# Patient Record
Sex: FEMALE | Race: BLACK OR AFRICAN AMERICAN | NOT HISPANIC OR LATINO
[De-identification: names, ages, dates, MRNs, and addresses within clinical notes are randomized per-mention and may not be internally consistent; named-entity substitution may affect disease eponyms.]

---

## 2021-04-08 ENCOUNTER — APPOINTMENT (OUTPATIENT)
Dept: OTOLARYNGOLOGY | Facility: CLINIC | Age: 86
End: 2021-04-08

## 2021-07-15 ENCOUNTER — INPATIENT (INPATIENT)
Facility: HOSPITAL | Age: 86
LOS: 2 days | Discharge: HOME | End: 2021-07-18
Attending: SURGERY | Admitting: SURGERY
Payer: MEDICARE

## 2021-07-15 VITALS
WEIGHT: 117.95 LBS | TEMPERATURE: 98 F | RESPIRATION RATE: 18 BRPM | HEIGHT: 63 IN | DIASTOLIC BLOOD PRESSURE: 79 MMHG | HEART RATE: 96 BPM | OXYGEN SATURATION: 98 % | SYSTOLIC BLOOD PRESSURE: 129 MMHG

## 2021-07-15 LAB
ALBUMIN SERPL ELPH-MCNC: 3.9 G/DL — SIGNIFICANT CHANGE UP (ref 3.3–5)
ALP SERPL-CCNC: 98 U/L — SIGNIFICANT CHANGE UP (ref 40–120)
ALT FLD-CCNC: 19 U/L — SIGNIFICANT CHANGE UP (ref 12–78)
ANION GAP SERPL CALC-SCNC: 5 MMOL/L — SIGNIFICANT CHANGE UP (ref 5–17)
APPEARANCE UR: CLEAR — SIGNIFICANT CHANGE UP
APTT BLD: 32 SEC — SIGNIFICANT CHANGE UP (ref 27.5–35.5)
AST SERPL-CCNC: 25 U/L — SIGNIFICANT CHANGE UP (ref 15–37)
BACTERIA # UR AUTO: ABNORMAL
BASOPHILS # BLD AUTO: 0.02 K/UL — SIGNIFICANT CHANGE UP (ref 0–0.2)
BASOPHILS NFR BLD AUTO: 0.3 % — SIGNIFICANT CHANGE UP (ref 0–2)
BILIRUB SERPL-MCNC: 0.5 MG/DL — SIGNIFICANT CHANGE UP (ref 0.2–1.2)
BILIRUB UR-MCNC: NEGATIVE — SIGNIFICANT CHANGE UP
BLD GP AB SCN SERPL QL: SIGNIFICANT CHANGE UP
BUN SERPL-MCNC: 24 MG/DL — HIGH (ref 7–23)
CALCIUM SERPL-MCNC: 9.9 MG/DL — SIGNIFICANT CHANGE UP (ref 8.5–10.1)
CHLORIDE SERPL-SCNC: 103 MMOL/L — SIGNIFICANT CHANGE UP (ref 96–108)
CO2 SERPL-SCNC: 30 MMOL/L — SIGNIFICANT CHANGE UP (ref 22–31)
COLOR SPEC: YELLOW — SIGNIFICANT CHANGE UP
CREAT SERPL-MCNC: 1.12 MG/DL — SIGNIFICANT CHANGE UP (ref 0.5–1.3)
DIFF PNL FLD: NEGATIVE — SIGNIFICANT CHANGE UP
EOSINOPHIL # BLD AUTO: 0.01 K/UL — SIGNIFICANT CHANGE UP (ref 0–0.5)
EOSINOPHIL NFR BLD AUTO: 0.2 % — SIGNIFICANT CHANGE UP (ref 0–6)
EPI CELLS # UR: SIGNIFICANT CHANGE UP
FLUAV AG NPH QL: SIGNIFICANT CHANGE UP
FLUBV AG NPH QL: SIGNIFICANT CHANGE UP
GLUCOSE SERPL-MCNC: 107 MG/DL — HIGH (ref 70–99)
GLUCOSE UR QL: NEGATIVE MG/DL — SIGNIFICANT CHANGE UP
HCT VFR BLD CALC: 39.4 % — SIGNIFICANT CHANGE UP (ref 34.5–45)
HGB BLD-MCNC: 12.7 G/DL — SIGNIFICANT CHANGE UP (ref 11.5–15.5)
IMM GRANULOCYTES NFR BLD AUTO: 0.2 % — SIGNIFICANT CHANGE UP (ref 0–1.5)
INR BLD: 1.11 RATIO — SIGNIFICANT CHANGE UP (ref 0.88–1.16)
KETONES UR-MCNC: ABNORMAL
LACTATE SERPL-SCNC: 1.3 MMOL/L — SIGNIFICANT CHANGE UP (ref 0.7–2)
LEUKOCYTE ESTERASE UR-ACNC: ABNORMAL
LIDOCAIN IGE QN: 47 U/L — LOW (ref 73–393)
LYMPHOCYTES # BLD AUTO: 1.25 K/UL — SIGNIFICANT CHANGE UP (ref 1–3.3)
LYMPHOCYTES # BLD AUTO: 19.7 % — SIGNIFICANT CHANGE UP (ref 13–44)
MCHC RBC-ENTMCNC: 28 PG — SIGNIFICANT CHANGE UP (ref 27–34)
MCHC RBC-ENTMCNC: 32.2 GM/DL — SIGNIFICANT CHANGE UP (ref 32–36)
MCV RBC AUTO: 87 FL — SIGNIFICANT CHANGE UP (ref 80–100)
MONOCYTES # BLD AUTO: 0.43 K/UL — SIGNIFICANT CHANGE UP (ref 0–0.9)
MONOCYTES NFR BLD AUTO: 6.8 % — SIGNIFICANT CHANGE UP (ref 2–14)
NEUTROPHILS # BLD AUTO: 4.63 K/UL — SIGNIFICANT CHANGE UP (ref 1.8–7.4)
NEUTROPHILS NFR BLD AUTO: 72.8 % — SIGNIFICANT CHANGE UP (ref 43–77)
NITRITE UR-MCNC: NEGATIVE — SIGNIFICANT CHANGE UP
NRBC # BLD: 0 /100 WBCS — SIGNIFICANT CHANGE UP (ref 0–0)
PH UR: 8 — SIGNIFICANT CHANGE UP (ref 5–8)
PLATELET # BLD AUTO: 257 K/UL — SIGNIFICANT CHANGE UP (ref 150–400)
POTASSIUM SERPL-MCNC: 4.5 MMOL/L — SIGNIFICANT CHANGE UP (ref 3.5–5.3)
POTASSIUM SERPL-SCNC: 4.5 MMOL/L — SIGNIFICANT CHANGE UP (ref 3.5–5.3)
PROT SERPL-MCNC: 8 GM/DL — SIGNIFICANT CHANGE UP (ref 6–8.3)
PROT UR-MCNC: 15 MG/DL
PROTHROM AB SERPL-ACNC: 12.8 SEC — SIGNIFICANT CHANGE UP (ref 10.6–13.6)
RBC # BLD: 4.53 M/UL — SIGNIFICANT CHANGE UP (ref 3.8–5.2)
RBC # FLD: 15.4 % — HIGH (ref 10.3–14.5)
SARS-COV-2 RNA SPEC QL NAA+PROBE: SIGNIFICANT CHANGE UP
SODIUM SERPL-SCNC: 138 MMOL/L — SIGNIFICANT CHANGE UP (ref 135–145)
SP GR SPEC: 1.01 — SIGNIFICANT CHANGE UP (ref 1.01–1.02)
UROBILINOGEN FLD QL: NEGATIVE MG/DL — SIGNIFICANT CHANGE UP
WBC # BLD: 6.35 K/UL — SIGNIFICANT CHANGE UP (ref 3.8–10.5)
WBC # FLD AUTO: 6.35 K/UL — SIGNIFICANT CHANGE UP (ref 3.8–10.5)
WBC UR QL: SIGNIFICANT CHANGE UP

## 2021-07-15 PROCEDURE — 99221 1ST HOSP IP/OBS SF/LOW 40: CPT

## 2021-07-15 PROCEDURE — 99285 EMERGENCY DEPT VISIT HI MDM: CPT

## 2021-07-15 PROCEDURE — 93010 ELECTROCARDIOGRAM REPORT: CPT

## 2021-07-15 PROCEDURE — 74018 RADEX ABDOMEN 1 VIEW: CPT | Mod: 26

## 2021-07-15 PROCEDURE — 99222 1ST HOSP IP/OBS MODERATE 55: CPT

## 2021-07-15 PROCEDURE — 74018 RADEX ABDOMEN 1 VIEW: CPT | Mod: 26,77

## 2021-07-15 PROCEDURE — 74177 CT ABD & PELVIS W/CONTRAST: CPT | Mod: 26,MA

## 2021-07-15 PROCEDURE — 71045 X-RAY EXAM CHEST 1 VIEW: CPT | Mod: 26

## 2021-07-15 RX ORDER — DEXTROSE MONOHYDRATE, SODIUM CHLORIDE, AND POTASSIUM CHLORIDE 50; .745; 4.5 G/1000ML; G/1000ML; G/1000ML
1000 INJECTION, SOLUTION INTRAVENOUS
Refills: 0 | Status: DISCONTINUED | OUTPATIENT
Start: 2021-07-15 | End: 2021-07-18

## 2021-07-15 RX ORDER — ONDANSETRON 8 MG/1
4 TABLET, FILM COATED ORAL EVERY 6 HOURS
Refills: 0 | Status: DISCONTINUED | OUTPATIENT
Start: 2021-07-15 | End: 2021-07-18

## 2021-07-15 RX ORDER — LATANOPROST 0.05 MG/ML
1 SOLUTION/ DROPS OPHTHALMIC; TOPICAL AT BEDTIME
Refills: 0 | Status: DISCONTINUED | OUTPATIENT
Start: 2021-07-15 | End: 2021-07-18

## 2021-07-15 RX ORDER — DORZOLAMIDE HYDROCHLORIDE 20 MG/ML
1 SOLUTION/ DROPS OPHTHALMIC
Refills: 0 | Status: DISCONTINUED | OUTPATIENT
Start: 2021-07-15 | End: 2021-07-18

## 2021-07-15 RX ORDER — HEPARIN SODIUM 5000 [USP'U]/ML
5000 INJECTION INTRAVENOUS; SUBCUTANEOUS EVERY 12 HOURS
Refills: 0 | Status: DISCONTINUED | OUTPATIENT
Start: 2021-07-15 | End: 2021-07-18

## 2021-07-15 RX ORDER — PANTOPRAZOLE SODIUM 20 MG/1
40 TABLET, DELAYED RELEASE ORAL DAILY
Refills: 0 | Status: DISCONTINUED | OUTPATIENT
Start: 2021-07-15 | End: 2021-07-18

## 2021-07-15 RX ORDER — ONDANSETRON 8 MG/1
4 TABLET, FILM COATED ORAL ONCE
Refills: 0 | Status: COMPLETED | OUTPATIENT
Start: 2021-07-15 | End: 2021-07-15

## 2021-07-15 RX ORDER — SODIUM CHLORIDE 9 MG/ML
1000 INJECTION INTRAMUSCULAR; INTRAVENOUS; SUBCUTANEOUS ONCE
Refills: 0 | Status: COMPLETED | OUTPATIENT
Start: 2021-07-15 | End: 2021-07-15

## 2021-07-15 RX ADMIN — ONDANSETRON 4 MILLIGRAM(S): 8 TABLET, FILM COATED ORAL at 17:34

## 2021-07-15 RX ADMIN — DORZOLAMIDE HYDROCHLORIDE 1 DROP(S): 20 SOLUTION/ DROPS OPHTHALMIC at 21:10

## 2021-07-15 RX ADMIN — PANTOPRAZOLE SODIUM 40 MILLIGRAM(S): 20 TABLET, DELAYED RELEASE ORAL at 20:09

## 2021-07-15 RX ADMIN — SODIUM CHLORIDE 1000 MILLILITER(S): 9 INJECTION INTRAMUSCULAR; INTRAVENOUS; SUBCUTANEOUS at 18:41

## 2021-07-15 RX ADMIN — DEXTROSE MONOHYDRATE, SODIUM CHLORIDE, AND POTASSIUM CHLORIDE 90 MILLILITER(S): 50; .745; 4.5 INJECTION, SOLUTION INTRAVENOUS at 20:45

## 2021-07-15 RX ADMIN — LATANOPROST 1 DROP(S): 0.05 SOLUTION/ DROPS OPHTHALMIC; TOPICAL at 21:13

## 2021-07-15 NOTE — ED ADULT NURSE NOTE - PMH
Benign neoplasm of colon    Cataract  bilateral  H/O: HTN (hypertension)    H/O: osteoarthritis  shoulders, hands, cervical spine

## 2021-07-15 NOTE — CONSULT NOTE ADULT - ASSESSMENT
87 yo lady with a pmhx of HTN, colon [id number], SBO who presents to the ED with Nausea, vomiting and abdominal pain since yesterday morning. Her pain has been a aching, intermittent, and diffuse. No chest pain, no sob, no diarrhea, no fevers or chills. Patient states these symptoms were similar to her past admission years ago in which she had to have surgery for adhesions       #SBO  NGT in place  NPO, IVF  Plan for possible surgery  Patient is able to tolerate 4 mets at baseline, no previous complication with anesthesia  RCRI Class 1  EKG sinus tachy, no acute ischemic changes noted   Patient is medically optimized at intermediate risk for urgent/emergent procedure    #Glaucoma  Continue eye drops    #HTN   Continue Lisinopril and Norvasc    #DVT ppx   On Heparin  Management per surgery     Thank you for allowing us to participate in the care of this patient. Our team will continue to follow along with you. Further recommendations to follow pending the clinical course.

## 2021-07-15 NOTE — H&P ADULT - NSHPPHYSICALEXAM_GEN_ALL_CORE
Alert and oriented x 3, normal mood and affect, no apparent risk to self or others. Constitutional: WDWN resting comfortably in bed; NAD  HEENT: NC/AT, PERRL, EOMI, anicteric sclera, no nasal discharge; uvula midline, no oropharyngeal erythema or exudates  Neck: supple; no JVD or thyromegaly  Respiratory: CTA B/L; no W/R/R, no retractions  Cardiac: +S1/S2; RRR; no M/R/G; PMI non-displaced  Gastrointestinal: soft,+ Distention, nontender, hypoactive bs  Extremities: , no clubbing or cyanosis; no peripheral edema  Musculoskeletal:  no joint swelling, tenderness or erythema  Vascular: 2+ radial, femoral, DP/PT pulses B/L  Skin: warm, dry and intact; no rashes, wounds, or scars  Neurologic: AAOx3; CNS grossly intact; no focal deficits

## 2021-07-15 NOTE — CONSULT NOTE ADULT - TIME BILLING
The total amount of time listed was spent reviewing the hospital notes, laboratory values, imaging findings, assessing/counseling the patient, discussing with consultant physicians, and nursing staff.

## 2021-07-15 NOTE — H&P ADULT - NSICDXPASTMEDICALHX_GEN_ALL_CORE_FT
PAST MEDICAL HISTORY:  Benign neoplasm of colon     Cataract bilateral    H/O: HTN (hypertension)     H/O: osteoarthritis shoulders, hands, cervical spine

## 2021-07-15 NOTE — ED ADULT NURSE NOTE - NSIMPLEMENTINTERV_GEN_ALL_ED
Implemented All Fall Risk Interventions:  [place] to call system. Call bell, personal items and telephone within reach. Instruct patient to call for assistance. Room bathroom lighting operational. Non-slip footwear when patient is off stretcher. Physically safe environment: no spills, clutter or unnecessary equipment. Stretcher in lowest position, wheels locked, appropriate side rails in place. Provide visual cue, wrist band, yellow gown, etc. Monitor gait and stability. Monitor for mental status changes and reorient to person, place, and time. Review medications for side effects contributing to fall risk. Reinforce activity limits and safety measures with patient and family.

## 2021-07-15 NOTE — H&P ADULT - HISTORY OF PRESENT ILLNESS
Pt is a 85 yo lady with a pmhx of HTN, colon [id number], SBO who presents to the ED with Nausea, vomiting and abdominal pain since yesterday morning. Her pain has been a aching, intermittent, and diffuse. No chest pain, no sob, no diarrhea, no fevers or chills. Patient states these symptoms were similar to her past admission years ago in which she had to have surgery for adhesions

## 2021-07-15 NOTE — H&P ADULT - ASSESSMENT
87y/o female with SBO  pmhx of HTN, colon [id number], ? ANETA 7- 8 years ago - not documented    - NPO, NGT decompression  - IV resuscitation  - Medical consultation  - Antiemetics PRN  - serial abdominal exams   -will discuss with Attending

## 2021-07-15 NOTE — CONSULT NOTE ADULT - SUBJECTIVE AND OBJECTIVE BOX
Patient is a 86y old  Female who presents with a chief complaint of     FROM ADMISSION H+P:   HPI:85 yo F with a pmhx of HTN, colon resection, SBO who presents to the ED with abdominal pain. Pt started having abd pain yesterday. Has been intermittent, diffuse. Has had vomiting. No chest pain, no sob, no diarrhea, no fevers. Was concerned that it was similar to prior SBO. No dysuria.      ----  INTERVAL HPI/OVERNIGHT EVENTS: Pt seen and evaluated at the bedside. No acute overnight events occurred.     ----  PAST MEDICAL & SURGICAL HISTORY:  Benign neoplasm of colon    H/O: HTN (hypertension)    H/O: osteoarthritis  shoulders, hands, cervical spine    Cataract  bilateral    Hx of right hemicolectomy  1998    S/P bunionectomy  bilateral        ----  Home medications:    ----  FAMILY HISTORY:  No pertinent family history in first degree relatives        ----  Allergies    No Known Allergies    Intolerances        ----  Social History:  - Alcohol: deneis  - tobacco: denies  - recreational drug use: denies   - occupation: retired   - living circumstances:  - ambulation status:    ----  REVIEW OF SYSTEMS:  CONSTITUTIONAL: denies fever, chills, fatigue, weakness  HEENT: denies blurred vision, sore throat  SKIN: denies new lesions, rash  CARDIOVASCULAR: denies chest pain, chest pressure, palpitations  RESPIRATORY: denies shortness of breath, sputum production  GASTROINTESTINAL: denies nausea, vomiting, diarrhea, abdominal pain  GENITOURINARY: denies dysuria, discharge  NEUROLOGICAL: denies numbness, headache, focal weakness  MUSCULOSKELETAL: denies new joint pain, muscle aches  HEMATOLOGIC: denies gross bleeding, bruising  LYMPHATICS: denies enlarged lymph nodes, extremity swelling  PSYCHIATRIC: denies recent changes in anxiety, depression  ENDOCRINOLOGIC: denies sweating, cold or heat intolerance    ----  PHYSICAL EXAM:  GENERAL: patient appears well, no acute distress, appropriately interactive  EYES: sclera clear, no exudates  ENMT: oropharynx clear without erythema, moist mucous membranes  NECK: supple, soft, no thyromegaly noted  LUNGS: good air entry bilaterally, clear to auscultation, symmetric breath sounds, no wheezing or rhonchi appreciated  HEART: soft S1/S2, regular rate and rhythm, no murmurs noted, no noted edema to bilateral lower extremities  GASTROINTESTINAL: abdomen is soft, nontender, nondistended, normoactive bowel sounds, no palpable masses  INTEGUMENT: good skin turgor, appropriate for ethnicity, appears well perfused, no jaundice noted  MUSCULOSKELETAL: no clubbing or cyanosis, no obvious deformity  NEUROLOGIC: awake, alert, oriented x3, good muscle tone in 4 extremities, no obvious sensory deficits  PSYCHIATRIC: mood is good, affect is congruent with mood, linear and logical thought process  HEME/LYMPH: no palpable supraclavicular nodules, no obvious ecchymosis     T(C): 36.9 (07-15-21 @ 12:42), Max: 36.9 (07-15-21 @ 12:42)  HR: 96 (07-15-21 @ 12:42) (96 - 96)  BP: 129/79 (07-15-21 @ 12:42) (129/79 - 129/79)  RR: 18 (07-15-21 @ 12:42) (18 - 18)  SpO2: 98% (07-15-21 @ 12:42) (98% - 98%)  Wt(kg): --    ----  I&O's Summary      LABS:                        12.7   6.35  )-----------( 257      ( 15 Jul 2021 14:06 )             39.4     07-15    138  |  103  |  24<H>  ----------------------------<  107<H>  4.5   |  30  |  1.12    Ca    9.9      15 Jul 2021 14:06    TPro  8.0  /  Alb  3.9  /  TBili  0.5  /  DBili  x   /  AST  25  /  ALT  19  /  AlkPhos  98  07-15    PT/INR - ( 15 Jul 2021 17:37 )   PT: 12.8 sec;   INR: 1.11 ratio         PTT - ( 15 Jul 2021 17:37 )  PTT:32.0 sec    CAPILLARY BLOOD GLUCOSE                    ----  Personally reviewed:  Vital sign trends: [  ] yes    [  ] no     [  ] n/a  Laboratory results: [  ] yes    [  ] no     [  ] n/a  Radiology results: [  ] yes    [  ] no     [  ] n/a  Culture results: [  ] yes    [  ] no     [  ] n/a  Consultant recommendations: [  ] yes    [  ] no     [  ] n/a    < from: CT Abdomen and Pelvis w/ IV Cont (07.15.21 @ 16:53) >    EXAM:  CT ABDOMEN AND PELVIS IC                            PROCEDURE DATE:  07/15/2021          INTERPRETATION:  CLINICAL INFORMATION: Cramps. Abnormal abdominal x-ray.    COMPARISON: February 15, 2014.    CONTRAST/COMPLICATIONS:  IV Contrast: Omnipaque 350  90 cc administered   10 cc discarded  Oral Contrast: NONE  Complications: None reported at time of study completion    PROCEDURE:  CT of the Abdomen and Pelvis was performed.  Sagittal and coronal reformats were performed.    FINDINGS:  LOWER CHEST: Within normal limits.    LIVER: Subcentimeter hypodense hepatic lesions, too small to characterize; however, many are unchanged since February 15, 2014.  BILE DUCTS: Normal caliber.  GALLBLADDER: Within normal limits.  SPLEEN: Within normal limits.  PANCREAS: Within normal limits.  ADRENALS: Within normal limits.  KIDNEYS/URETERS: No hydronephrosis. Subcentimeter hypodense hepatic lesions, too small to characterize.    BLADDER: Within normal limits.  REPRODUCTIVE ORGANS: Calcified uterine leiomyomas.    BOWEL: High-grade small bowel obstruction with transition point in the right anterior pelvis (series 2, image 95). Partial right hemicolectomy with ileocolic anastomosis. Diverticulosis.  PERITONEUM: No ascites.  VESSELS: Atherosclerotic changes.  RETROPERITONEUM/LYMPH NODES: No lymphadenopathy.  ABDOMINAL WALL: Within normal limits.  BONES: Degenerative changes.    IMPRESSION:  High-grade small bowel obstruction with transition point in the right anterior pelvis.    --- End of Report ---            MARIZA NEWBY MD; Attending Radiologist  This document has been electronically signed. Jul 15 2021  5:21PM    < end of copied text >    < from: Xray Kidney Ureter Bladder (07.15.21 @ 14:54) >  EXAM:  XR KUB 1 VIEW                            PROCEDURE DATE:  07/15/2021          INTERPRETATION:  Abdominal pain, history of small bowel obstruction.    AP supine abdomen. Prior 7/2/2016.    Moderate colonic stool burden.  mild to moderate gaseous distention of small bowel loops in the lower abdomen may represent ileus or partial obstruction. Small amount of colonic gas noted. Correlate with CT. Nonspecific pelvic calcifications.    IMPRESSION: Distended small bowel in the lower abdomen should be correlated with CT abdomen pelvis    --- End of Report ---            CLAUDETTE GUERRERO MD; Attending Radiologist  This document has been electronically signed. Jul 15 2021  3:18PM    < end of copied text >    < from: Xray Chest 1 View AP/PA. (07.15.21 @ 14:54) >  EXAM:  XR CHEST AP OR PA 1V                            PROCEDURE DATE:  07/15/2021          INTERPRETATION:  Abdominal pain.    AP chest. Prior 3/3/2014.    Normal heart size. Biapical pleural thickening. No consolidation or effusion. Mammillated left hemidiaphragm similar to prior. Degenerative change left shoulder.    IMPRESSION: No active infiltrates.    --- End of Report ---            CLAUDETTE GUERRERO MD; Attending Radiologist  This document has been electronically signed. Jul 15 2021  3:21PM    < end of copied text >         Patient is a 86y old  Female who presents with a chief complaint of     FROM ADMISSION H+P:   87 yo lady with a pmhx of HTN, colon [id number], SBO who presents to the ED with Nausea, vomiting and abdominal pain since yesterday morning. Her pain has been a aching, intermittent, and diffuse. No chest pain, no sob, no diarrhea, no fevers or chills. Patient states these symptoms were similar to her past admission years ago in which she had to have surgery for adhesions     ----  INTERVAL HPI/OVERNIGHT EVENTS: Pt seen and evaluated at the bedside. No acute overnight events occurred.     ----  PAST MEDICAL & SURGICAL HISTORY:  Benign neoplasm of colon    H/O: HTN (hypertension)    H/O: osteoarthritis  shoulders, hands, cervical spine    Cataract  bilateral    Hx of right hemicolectomy  1998    S/P bunionectomy  bilateral        ----  Home medications:    ----  FAMILY HISTORY:  No pertinent family history in first degree relatives        ----  Allergies    No Known Allergies    Intolerances        ----  Social History:  - Alcohol: deneis  - tobacco: denies  - recreational drug use: denies   - occupation: retired     ----  REVIEW OF SYSTEMS:  CONSTITUTIONAL: denies fever, chills, fatigue, weakness  HEENT: denies blurred vision, sore throat  CARDIOVASCULAR: denies chest pain, chest pressure, palpitations  RESPIRATORY: denies shortness of breath, sputum production  GASTROINTESTINAL: admits abdominal pain   GENITOURINARY: denies dysuria, discharge  NEUROLOGICAL: denies numbness, headache, focal weakness  MUSCULOSKELETAL: denies new joint pain, muscle aches  HEMATOLOGIC: denies gross bleeding, bruising    ----  PHYSICAL EXAM:  GENERAL: patient appears well, no acute distress, appropriately interactive  EYES: sclera clear, no exudates  LUNGS: good air entry bilaterally, clear to auscultation, symmetric breath sounds, no wheezing or rhonchi appreciated  HEART: soft S1/S2, regular rate and rhythm, no murmurs noted, no noted edema to bilateral lower extremities  GASTROINTESTINAL: abdomen is soft, mildly tender to deep palpation, diminished bs   INTEGUMENT: good skin turgor, appropriate for ethnicity  MUSCULOSKELETAL: no clubbing or cyanosis, no obvious deformity  NEUROLOGIC: awake, alert, oriented x3, good muscle tone in 4 extremities, no obvious sensory deficits  PSYCHIATRIC: mood is good, affect is congruent with mood, linear and logical thought process    T(C): 36.9 (07-15-21 @ 12:42), Max: 36.9 (07-15-21 @ 12:42)  HR: 96 (07-15-21 @ 12:42) (96 - 96)  BP: 129/79 (07-15-21 @ 12:42) (129/79 - 129/79)  RR: 18 (07-15-21 @ 12:42) (18 - 18)  SpO2: 98% (07-15-21 @ 12:42) (98% - 98%)  Wt(kg): --    ----  I&O's Summary      LABS:                        12.7   6.35  )-----------( 257      ( 15 Jul 2021 14:06 )             39.4     07-15    138  |  103  |  24<H>  ----------------------------<  107<H>  4.5   |  30  |  1.12    Ca    9.9      15 Jul 2021 14:06    TPro  8.0  /  Alb  3.9  /  TBili  0.5  /  DBili  x   /  AST  25  /  ALT  19  /  AlkPhos  98  07-15    PT/INR - ( 15 Jul 2021 17:37 )   PT: 12.8 sec;   INR: 1.11 ratio         PTT - ( 15 Jul 2021 17:37 )  PTT:32.0 sec    CAPILLARY BLOOD GLUCOSE                    ----  Personally reviewed:  Vital sign trends: [ x ] yes    [  ] no     [  ] n/a  Laboratory results: [x  ] yes    [  ] no     [  ] n/a  Radiology results: [x  ] yes    [  ] no     [  ] n/a  Culture results: [x  ] yes    [  ] no     [  ] n/a  Consultant recommendations: [  ] yes    [  ] no     [  ] n/a    < from: CT Abdomen and Pelvis w/ IV Cont (07.15.21 @ 16:53) >    EXAM:  CT ABDOMEN AND PELVIS IC                            PROCEDURE DATE:  07/15/2021          INTERPRETATION:  CLINICAL INFORMATION: Cramps. Abnormal abdominal x-ray.    COMPARISON: February 15, 2014.    CONTRAST/COMPLICATIONS:  IV Contrast: Omnipaque 350  90 cc administered   10 cc discarded  Oral Contrast: NONE  Complications: None reported at time of study completion    PROCEDURE:  CT of the Abdomen and Pelvis was performed.  Sagittal and coronal reformats were performed.    FINDINGS:  LOWER CHEST: Within normal limits.    LIVER: Subcentimeter hypodense hepatic lesions, too small to characterize; however, many are unchanged since February 15, 2014.  BILE DUCTS: Normal caliber.  GALLBLADDER: Within normal limits.  SPLEEN: Within normal limits.  PANCREAS: Within normal limits.  ADRENALS: Within normal limits.  KIDNEYS/URETERS: No hydronephrosis. Subcentimeter hypodense hepatic lesions, too small to characterize.    BLADDER: Within normal limits.  REPRODUCTIVE ORGANS: Calcified uterine leiomyomas.    BOWEL: High-grade small bowel obstruction with transition point in the right anterior pelvis (series 2, image 95). Partial right hemicolectomy with ileocolic anastomosis. Diverticulosis.  PERITONEUM: No ascites.  VESSELS: Atherosclerotic changes.  RETROPERITONEUM/LYMPH NODES: No lymphadenopathy.  ABDOMINAL WALL: Within normal limits.  BONES: Degenerative changes.    IMPRESSION:  High-grade small bowel obstruction with transition point in the right anterior pelvis.    --- End of Report ---            MARIZA NEWBY MD; Attending Radiologist  This document has been electronically signed. Jul 15 2021  5:21PM    < end of copied text >    < from: Xray Kidney Ureter Bladder (07.15.21 @ 14:54) >  EXAM:  XR KUB 1 VIEW                            PROCEDURE DATE:  07/15/2021          INTERPRETATION:  Abdominal pain, history of small bowel obstruction.    AP supine abdomen. Prior 7/2/2016.    Moderate colonic stool burden.  mild to moderate gaseous distention of small bowel loops in the lower abdomen may represent ileus or partial obstruction. Small amount of colonic gas noted. Correlate with CT. Nonspecific pelvic calcifications.    IMPRESSION: Distended small bowel in the lower abdomen should be correlated with CT abdomen pelvis    --- End of Report ---            CLAUDETTE GUERRERO MD; Attending Radiologist  This document has been electronically signed. Jul 15 2021  3:18PM    < end of copied text >    < from: Xray Chest 1 View AP/PA. (07.15.21 @ 14:54) >  EXAM:  XR CHEST AP OR PA 1V                            PROCEDURE DATE:  07/15/2021          INTERPRETATION:  Abdominal pain.    AP chest. Prior 3/3/2014.    Normal heart size. Biapical pleural thickening. No consolidation or effusion. Mammillated left hemidiaphragm similar to prior. Degenerative change left shoulder.    IMPRESSION: No active infiltrates.    --- End of Report ---            CLAUDETTE GUERRERO MD; Attending Radiologist  This document has been electronically signed. Jul 15 2021  3:21PM    < end of copied text >         Patient is a 86y old  Female who presents with a chief complaint of     FROM ADMISSION H+P:   85 yo lady with a pmhx of HTN, colon [id number], SBO who presents to the ED with Nausea, vomiting and abdominal pain since yesterday morning. Her pain has been a aching, intermittent, and diffuse. No chest pain, no sob, no diarrhea, no fevers or chills. Patient states these symptoms were similar to her past admission years ago in which she had to have surgery for adhesions     ----  INTERVAL HPI/OVERNIGHT EVENTS: Pt seen and evaluated at the bedside.  ----  PAST MEDICAL & SURGICAL HISTORY:  Benign neoplasm of colon    H/O: HTN (hypertension)    H/O: osteoarthritis  shoulders, hands, cervical spine    Cataract  bilateral    Hx of right hemicolectomy  1998    S/P bunionectomy  bilateral        ----  Home medications:    ----  FAMILY HISTORY:  No pertinent family history in first degree relatives        ----  Allergies    No Known Allergies    Intolerances        ----  Social History:  - Alcohol: deneis  - tobacco: denies  - recreational drug use: denies   - occupation: retired     ----  REVIEW OF SYSTEMS:  CONSTITUTIONAL: denies fever, chills, fatigue, weakness  HEENT: denies blurred vision, sore throat  CARDIOVASCULAR: denies chest pain, chest pressure, palpitations  RESPIRATORY: denies shortness of breath, sputum production  GASTROINTESTINAL: admits abdominal pain   GENITOURINARY: denies dysuria, discharge  NEUROLOGICAL: denies numbness, headache, focal weakness  MUSCULOSKELETAL: denies new joint pain, muscle aches  HEMATOLOGIC: denies gross bleeding, bruising    ----  PHYSICAL EXAM:  GENERAL: patient appears well, no acute distress, appropriately interactive  EYES: sclera clear, no exudates  LUNGS: good air entry bilaterally, clear to auscultation, symmetric breath sounds, no wheezing or rhonchi appreciated  HEART: soft S1/S2, regular rate and rhythm, no murmurs noted, no noted edema to bilateral lower extremities  GASTROINTESTINAL: abdomen is soft, mildly tender to deep palpation, diminished bs   INTEGUMENT: good skin turgor, appropriate for ethnicity  MUSCULOSKELETAL: no clubbing or cyanosis, no obvious deformity  NEUROLOGIC: awake, alert, oriented x3, good muscle tone in 4 extremities, no obvious sensory deficits  PSYCHIATRIC: mood is good, affect is congruent with mood, linear and logical thought process    T(C): 36.9 (07-15-21 @ 12:42), Max: 36.9 (07-15-21 @ 12:42)  HR: 96 (07-15-21 @ 12:42) (96 - 96)  BP: 129/79 (07-15-21 @ 12:42) (129/79 - 129/79)  RR: 18 (07-15-21 @ 12:42) (18 - 18)  SpO2: 98% (07-15-21 @ 12:42) (98% - 98%)  Wt(kg): --    ----  I&O's Summary      LABS:                        12.7   6.35  )-----------( 257      ( 15 Jul 2021 14:06 )             39.4     07-15    138  |  103  |  24<H>  ----------------------------<  107<H>  4.5   |  30  |  1.12    Ca    9.9      15 Jul 2021 14:06    TPro  8.0  /  Alb  3.9  /  TBili  0.5  /  DBili  x   /  AST  25  /  ALT  19  /  AlkPhos  98  07-15    PT/INR - ( 15 Jul 2021 17:37 )   PT: 12.8 sec;   INR: 1.11 ratio         PTT - ( 15 Jul 2021 17:37 )  PTT:32.0 sec    CAPILLARY BLOOD GLUCOSE                    ----  Personally reviewed:  Vital sign trends: [ x ] yes    [  ] no     [  ] n/a  Laboratory results: [x  ] yes    [  ] no     [  ] n/a  Radiology results: [x  ] yes    [  ] no     [  ] n/a  Culture results: [x  ] yes    [  ] no     [  ] n/a  Consultant recommendations: [  ] yes    [  ] no     [  ] n/a    < from: CT Abdomen and Pelvis w/ IV Cont (07.15.21 @ 16:53) >    EXAM:  CT ABDOMEN AND PELVIS IC                            PROCEDURE DATE:  07/15/2021          INTERPRETATION:  CLINICAL INFORMATION: Cramps. Abnormal abdominal x-ray.    COMPARISON: February 15, 2014.    CONTRAST/COMPLICATIONS:  IV Contrast: Omnipaque 350  90 cc administered   10 cc discarded  Oral Contrast: NONE  Complications: None reported at time of study completion    PROCEDURE:  CT of the Abdomen and Pelvis was performed.  Sagittal and coronal reformats were performed.    FINDINGS:  LOWER CHEST: Within normal limits.    LIVER: Subcentimeter hypodense hepatic lesions, too small to characterize; however, many are unchanged since February 15, 2014.  BILE DUCTS: Normal caliber.  GALLBLADDER: Within normal limits.  SPLEEN: Within normal limits.  PANCREAS: Within normal limits.  ADRENALS: Within normal limits.  KIDNEYS/URETERS: No hydronephrosis. Subcentimeter hypodense hepatic lesions, too small to characterize.    BLADDER: Within normal limits.  REPRODUCTIVE ORGANS: Calcified uterine leiomyomas.    BOWEL: High-grade small bowel obstruction with transition point in the right anterior pelvis (series 2, image 95). Partial right hemicolectomy with ileocolic anastomosis. Diverticulosis.  PERITONEUM: No ascites.  VESSELS: Atherosclerotic changes.  RETROPERITONEUM/LYMPH NODES: No lymphadenopathy.  ABDOMINAL WALL: Within normal limits.  BONES: Degenerative changes.    IMPRESSION:  High-grade small bowel obstruction with transition point in the right anterior pelvis.    --- End of Report ---            MARIZA NEWBY MD; Attending Radiologist  This document has been electronically signed. Jul 15 2021  5:21PM    < end of copied text >    < from: Xray Kidney Ureter Bladder (07.15.21 @ 14:54) >  EXAM:  XR KUB 1 VIEW                            PROCEDURE DATE:  07/15/2021          INTERPRETATION:  Abdominal pain, history of small bowel obstruction.    AP supine abdomen. Prior 7/2/2016.    Moderate colonic stool burden.  mild to moderate gaseous distention of small bowel loops in the lower abdomen may represent ileus or partial obstruction. Small amount of colonic gas noted. Correlate with CT. Nonspecific pelvic calcifications.    IMPRESSION: Distended small bowel in the lower abdomen should be correlated with CT abdomen pelvis    --- End of Report ---            CLAUDETTE GUERRERO MD; Attending Radiologist  This document has been electronically signed. Jul 15 2021  3:18PM    < end of copied text >    < from: Xray Chest 1 View AP/PA. (07.15.21 @ 14:54) >  EXAM:  XR CHEST AP OR PA 1V                            PROCEDURE DATE:  07/15/2021          INTERPRETATION:  Abdominal pain.    AP chest. Prior 3/3/2014.    Normal heart size. Biapical pleural thickening. No consolidation or effusion. Mammillated left hemidiaphragm similar to prior. Degenerative change left shoulder.    IMPRESSION: No active infiltrates.    --- End of Report ---            CLAUDETTE GUERRERO MD; Attending Radiologist  This document has been electronically signed. Jul 15 2021  3:21PM    < end of copied text >

## 2021-07-15 NOTE — ED PROVIDER NOTE - CLINICAL SUMMARY MEDICAL DECISION MAKING FREE TEXT BOX
Ddx: Ro sbo/ diverticulitis  Plan: cbc, cmp, lactate, lipase, CT abd, kub, cxr, pt declines pain meds, reassess

## 2021-07-15 NOTE — ED ADULT NURSE NOTE - NS_SISCREENINGSR_GEN_ALL_ED
Daily Note     Today's date: 2019  Patient name: Joby Gordon  : 1943  MRN: [id number]  Referring provider: Janes Menjivar Negative

## 2021-07-15 NOTE — ED ADULT NURSE NOTE - OBJECTIVE STATEMENT
p pt alert and oriented x 4, pt c/o abdominal pain for approx 2 days , denies diarrhea , fever or chills .

## 2021-07-16 LAB
ANION GAP SERPL CALC-SCNC: 7 MMOL/L — SIGNIFICANT CHANGE UP (ref 5–17)
BUN SERPL-MCNC: 20 MG/DL — SIGNIFICANT CHANGE UP (ref 7–23)
CALCIUM SERPL-MCNC: 9.2 MG/DL — SIGNIFICANT CHANGE UP (ref 8.5–10.1)
CHLORIDE SERPL-SCNC: 103 MMOL/L — SIGNIFICANT CHANGE UP (ref 96–108)
CO2 SERPL-SCNC: 28 MMOL/L — SIGNIFICANT CHANGE UP (ref 22–31)
COVID-19 SPIKE DOMAIN AB INTERP: POSITIVE
COVID-19 SPIKE DOMAIN ANTIBODY RESULT: >250 U/ML — HIGH
CREAT SERPL-MCNC: 1 MG/DL — SIGNIFICANT CHANGE UP (ref 0.5–1.3)
GLUCOSE SERPL-MCNC: 126 MG/DL — HIGH (ref 70–99)
HCT VFR BLD CALC: 39.4 % — SIGNIFICANT CHANGE UP (ref 34.5–45)
HGB BLD-MCNC: 12.3 G/DL — SIGNIFICANT CHANGE UP (ref 11.5–15.5)
MAGNESIUM SERPL-MCNC: 2.4 MG/DL — SIGNIFICANT CHANGE UP (ref 1.6–2.6)
MCHC RBC-ENTMCNC: 27.4 PG — SIGNIFICANT CHANGE UP (ref 27–34)
MCHC RBC-ENTMCNC: 31.2 GM/DL — LOW (ref 32–36)
MCV RBC AUTO: 87.8 FL — SIGNIFICANT CHANGE UP (ref 80–100)
NRBC # BLD: 0 /100 WBCS — SIGNIFICANT CHANGE UP (ref 0–0)
PHOSPHATE SERPL-MCNC: 3.5 MG/DL — SIGNIFICANT CHANGE UP (ref 2.5–4.5)
PLATELET # BLD AUTO: 260 K/UL — SIGNIFICANT CHANGE UP (ref 150–400)
POTASSIUM SERPL-MCNC: 4.4 MMOL/L — SIGNIFICANT CHANGE UP (ref 3.5–5.3)
POTASSIUM SERPL-SCNC: 4.4 MMOL/L — SIGNIFICANT CHANGE UP (ref 3.5–5.3)
RBC # BLD: 4.49 M/UL — SIGNIFICANT CHANGE UP (ref 3.8–5.2)
RBC # FLD: 15.5 % — HIGH (ref 10.3–14.5)
SARS-COV-2 IGG+IGM SERPL QL IA: >250 U/ML — HIGH
SARS-COV-2 IGG+IGM SERPL QL IA: POSITIVE
SODIUM SERPL-SCNC: 138 MMOL/L — SIGNIFICANT CHANGE UP (ref 135–145)
WBC # BLD: 5.6 K/UL — SIGNIFICANT CHANGE UP (ref 3.8–10.5)
WBC # FLD AUTO: 5.6 K/UL — SIGNIFICANT CHANGE UP (ref 3.8–10.5)

## 2021-07-16 PROCEDURE — 99232 SBSQ HOSP IP/OBS MODERATE 35: CPT

## 2021-07-16 PROCEDURE — 74018 RADEX ABDOMEN 1 VIEW: CPT | Mod: 26,76

## 2021-07-16 RX ORDER — DIATRIZOATE MEGLUMINE 180 MG/ML
90 INJECTION, SOLUTION INTRAVESICAL ONCE
Refills: 0 | Status: COMPLETED | OUTPATIENT
Start: 2021-07-16 | End: 2021-07-16

## 2021-07-16 RX ADMIN — PANTOPRAZOLE SODIUM 40 MILLIGRAM(S): 20 TABLET, DELAYED RELEASE ORAL at 11:20

## 2021-07-16 RX ADMIN — ONDANSETRON 4 MILLIGRAM(S): 8 TABLET, FILM COATED ORAL at 16:48

## 2021-07-16 RX ADMIN — HEPARIN SODIUM 5000 UNIT(S): 5000 INJECTION INTRAVENOUS; SUBCUTANEOUS at 17:04

## 2021-07-16 RX ADMIN — DEXTROSE MONOHYDRATE, SODIUM CHLORIDE, AND POTASSIUM CHLORIDE 90 MILLILITER(S): 50; .745; 4.5 INJECTION, SOLUTION INTRAVENOUS at 16:48

## 2021-07-16 RX ADMIN — LATANOPROST 1 DROP(S): 0.05 SOLUTION/ DROPS OPHTHALMIC; TOPICAL at 21:06

## 2021-07-16 RX ADMIN — DORZOLAMIDE HYDROCHLORIDE 1 DROP(S): 20 SOLUTION/ DROPS OPHTHALMIC at 21:06

## 2021-07-16 RX ADMIN — DORZOLAMIDE HYDROCHLORIDE 1 DROP(S): 20 SOLUTION/ DROPS OPHTHALMIC at 07:35

## 2021-07-16 RX ADMIN — DIATRIZOATE MEGLUMINE 90 MILLILITER(S): 180 INJECTION, SOLUTION INTRAVESICAL at 11:49

## 2021-07-16 RX ADMIN — HEPARIN SODIUM 5000 UNIT(S): 5000 INJECTION INTRAVENOUS; SUBCUTANEOUS at 05:18

## 2021-07-16 NOTE — PROGRESS NOTE ADULT - SUBJECTIVE AND OBJECTIVE BOX
Pt. seen and examined at bedside resting comfortably.  Admits mild abd pain . Denies fever/chills, chest pain,     Vital Signs Last 24 Hrs  T(C): 37.1 (2021 05:16), Max: 37.6 (15 Jul 2021 19:50)  T(F): 98.7 (2021 05:16), Max: 99.7 (15 Jul 2021 19:50)  HR: 89 (2021 05:16) (89 - 102)  BP: 122/75 (2021 05:16) (119/76 - 147/86)  BP(mean): --  RR: 17 (2021 05:16) (16 - 18)  SpO2: 99% (2021 05:16) (96% - 99%)    PHYSICAL EXAM:    GENERAL: NAD  HEAD:  Atraumatic, Normocephalic  EYES: EOMI, PERRLA, conjunctiva and sclera clear  CHEST/LUNG: Clear to ausculation, bilaterally   HEART: S1S2  ABDOMEN: non distended, +BS, soft, non tender, no guarding  EXTREMITIES:  calf soft, non tender b/l. 2+ distal pulses b/l.     I&O's Detail    15 Jul 2021 07:01  -  2021 05:46  --------------------------------------------------------  IN:  Total IN: 0 mL    OUT:    Nasogastric/Oral tube (mL): 650 mL  Total OUT: 650 mL    Total NET: -650 mL          LABS:                        12.7   6.35  )-----------( 257      ( 15 Jul 2021 14:06 )             39.4     07-15    138  |  103  |  24<H>  ----------------------------<  107<H>  4.5   |  30  |  1.12    Ca    9.9      15 Jul 2021 14:06    TPro  8.0  /  Alb  3.9  /  TBili  0.5  /  DBili  x   /  AST  25  /  ALT  19  /  AlkPhos  98  07-15    PT/INR - ( 15 Jul 2021 17:37 )   PT: 12.8 sec;   INR: 1.11 ratio         PTT - ( 15 Jul 2021 17:37 )  PTT:32.0 sec  Urinalysis Basic - ( 15 Jul 2021 22:30 )    Color: Yellow / Appearance: Clear / S.010 / pH: x  Gluc: x / Ketone: Trace  / Bili: Negative / Urobili: Negative mg/dL   Blood: x / Protein: 15 mg/dL / Nitrite: Negative   Leuk Esterase: Trace / RBC: x / WBC 0-2   Sq Epi: x / Non Sq Epi: Occasional / Bacteria: Moderate        type    RADIOLOGY & ADDITIONAL STUDIES:    85y/o female  pmhx of HTN, colon [id number], ? ANETA with SBO managed conservatively      - NPO,   -Cont NGT decompression to LCS  - Antiemetics PRN  - serial abdominal exams   -will discuss with Attending  -f/up AM labs   -DVT prophylaxis: Heparin and IPCs   Pt. seen and examined at bedside resting comfortably.  No fl;atus/BM  Admits mild abd pain . Denies fever/chills, chest pain,     Vital Signs Last 24 Hrs  T(C): 37.1 (2021 05:16), Max: 37.6 (15 Jul 2021 19:50)  T(F): 98.7 (2021 05:16), Max: 99.7 (15 Jul 2021 19:50)  HR: 89 (2021 05:16) (89 - 102)  BP: 122/75 (2021 05:16) (119/76 - 147/86)  BP(mean): --  RR: 17 (2021 05:16) (16 - 18)  SpO2: 99% (2021 05:16) (96% - 99%)    PHYSICAL EXAM:    GENERAL: NAD  HEAD:  Atraumatic, Normocephalic  EYES: EOMI, PERRLA, conjunctiva and sclera clear  CHEST/LUNG: Clear to ausculation, bilaterally   HEART: S1S2  ABDOMEN: non distended, +BS, soft, non tender, no guarding  EXTREMITIES:  calf soft, non tender b/l. 2+ distal pulses b/l.     I&O's Detail    15 Jul 2021 07:01  -  2021 05:46  --------------------------------------------------------  IN:  Total IN: 0 mL    OUT:    Nasogastric/Oral tube (mL): 650 mL  Total OUT: 650 mL    Total NET: -650 mL          LABS:                        12.7   6.35  )-----------( 257      ( 15 Jul 2021 14:06 )             39.4     07-15    138  |  103  |  24<H>  ----------------------------<  107<H>  4.5   |  30  |  1.12    Ca    9.9      15 Jul 2021 14:06    TPro  8.0  /  Alb  3.9  /  TBili  0.5  /  DBili  x   /  AST  25  /  ALT  19  /  AlkPhos  98  07-15    PT/INR - ( 15 Jul 2021 17:37 )   PT: 12.8 sec;   INR: 1.11 ratio         PTT - ( 15 Jul 2021 17:37 )  PTT:32.0 sec  Urinalysis Basic - ( 15 Jul 2021 22:30 )    Color: Yellow / Appearance: Clear / S.010 / pH: x  Gluc: x / Ketone: Trace  / Bili: Negative / Urobili: Negative mg/dL   Blood: x / Protein: 15 mg/dL / Nitrite: Negative   Leuk Esterase: Trace / RBC: x / WBC 0-2   Sq Epi: x / Non Sq Epi: Occasional / Bacteria: Moderate        type    RADIOLOGY & ADDITIONAL STUDIES:    85y/o female  pmhx of HTN, colon [id number], ? ANETA with SBO managed conservatively . No bowel fxn      - NPO,   -Cont NGT decompression to LCS  - Antiemetics PRN  - serial abdominal exams   -will discuss with Attending  -f/up AM labs   -DVT prophylaxis: Heparin and IPCs

## 2021-07-16 NOTE — PROGRESS NOTE ADULT - SUBJECTIVE AND OBJECTIVE BOX
Patient is a 86y old  Female who presents with a chief complaint of     INTERVAL HPI/OVERNIGHT EVENTS: Patient seen and examined at bedside. No overnight events. Patient feeling better, abdominal pain improving      MEDICATIONS  (STANDING):  dextrose 5% + sodium chloride 0.45% with potassium chloride 20 mEq/L 1000 milliLiter(s) (90 mL/Hr) IV Continuous <Continuous>  dorzolamide 2% Ophthalmic Solution 1 Drop(s) Left EYE <User Schedule>  heparin   Injectable 5000 Unit(s) SubCutaneous every 12 hours  latanoprost 0.005% Ophthalmic Solution 1 Drop(s) Both EYES at bedtime  pantoprazole  Injectable 40 milliGRAM(s) IV Push daily    MEDICATIONS  (PRN):  ondansetron Injectable 4 milliGRAM(s) IV Push every 6 hours PRN Nausea      Allergies    No Known Allergies    Intolerances        REVIEW OF SYSTEMS:  CONSTITUTIONAL: No fever or chills  HEENT:  No headache, no sore throat  RESPIRATORY: No cough, wheezing, or shortness of breath  CARDIOVASCULAR: No chest pain, palpitations, or leg swelling  GASTROINTESTINAL: Admits abdominal pain which is improving, no nausea, vomiting, or diarrhea  GENITOURINARY: No dysuria, frequency, or hematuria  NEUROLOGICAL: no focal weakness or dizziness  MUSCULOSKELETAL: no myalgias     Vital Signs Last 24 Hrs  T(C): 37.2 (2021 12:07), Max: 37.6 (15 Jul 2021 19:50)  T(F): 98.9 (2021 12:07), Max: 99.7 (15 Jul 2021 19:50)  HR: 87 (2021 12:07) (87 - 102)  BP: 123/68 (2021 12:07) (119/76 - 147/86)  BP(mean): --  RR: 17 (2021 12:07) (16 - 18)  SpO2: 98% (2021 12:07) (96% - 99%)    PHYSICAL EXAM:  GENERAL: NAD  HEENT:  EOMI, moist mucous membranes  CHEST/LUNG:  CTA b/l, no rales, wheezes, or rhonchi  HEART:  RRR, S1, S2  ABDOMEN: very mild tenderness to deep palpation (improved)  BS+, soft, nondistended  EXTREMITIES: no edema, cyanosis, or calf tenderness  NERVOUS SYSTEM: AA&Ox3    LABS:                        12.3   5.60  )-----------( 260      ( 2021 08:22 )             39.4     CBC Full  -  ( 2021 08:22 )  WBC Count : 5.60 K/uL  Hemoglobin : 12.3 g/dL  Hematocrit : 39.4 %  Platelet Count - Automated : 260 K/uL  Mean Cell Volume : 87.8 fl  Mean Cell Hemoglobin : 27.4 pg  Mean Cell Hemoglobin Concentration : 31.2 gm/dL  Auto Neutrophil # : x  Auto Lymphocyte # : x  Auto Monocyte # : x  Auto Eosinophil # : x  Auto Basophil # : x  Auto Neutrophil % : x  Auto Lymphocyte % : x  Auto Monocyte % : x  Auto Eosinophil % : x  Auto Basophil % : x    2021 08:22    138    |  103    |  20     ----------------------------<  126    4.4     |  28     |  1.00     Ca    9.2        2021 08:22  Phos  3.5       2021 08:22  Mg     2.4       2021 08:22      PT/INR - ( 15 Jul 2021 17:37 )   PT: 12.8 sec;   INR: 1.11 ratio         PTT - ( 15 Jul 2021 17:37 )  PTT:32.0 sec  Urinalysis Basic - ( 15 Jul 2021 22:30 )    Color: Yellow / Appearance: Clear / S.010 / pH: x  Gluc: x / Ketone: Trace  / Bili: Negative / Urobili: Negative mg/dL   Blood: x / Protein: 15 mg/dL / Nitrite: Negative   Leuk Esterase: Trace / RBC: x / WBC 0-2   Sq Epi: x / Non Sq Epi: Occasional / Bacteria: Moderate      CAPILLARY BLOOD GLUCOSE        RADIOLOGY & ADDITIONAL TESTS: < from: CT Abdomen and Pelvis w/ IV Cont (07.15.21 @ 16:53) >    EXAM:  CT ABDOMEN AND PELVIS IC                            PROCEDURE DATE:  07/15/2021          INTERPRETATION:  CLINICAL INFORMATION: Cramps. Abnormal abdominal x-ray.    COMPARISON: February 15, 2014.    CONTRAST/COMPLICATIONS:  IV Contrast: Omnipaque 350  90 cc administered   10 cc discarded  Oral Contrast: NONE  Complications: None reported at time of study completion    PROCEDURE:  CT of the Abdomen and Pelvis was performed.  Sagittal and coronal reformats were performed.    FINDINGS:  LOWER CHEST: Within normal limits.    LIVER: Subcentimeter hypodense hepatic lesions, too small to characterize; however, many are unchanged since February 15, 2014.  BILE DUCTS: Normal caliber.  GALLBLADDER: Within normal limits.  SPLEEN: Within normal limits.  PANCREAS: Within normal limits.  ADRENALS: Within normal limits.  KIDNEYS/URETERS: No hydronephrosis. Subcentimeter hypodense hepatic lesions, too small to characterize.    BLADDER: Within normal limits.  REPRODUCTIVE ORGANS: Calcified uterine leiomyomas.    BOWEL: High-grade small bowel obstruction with transition point in the right anterior pelvis (series 2, image 95). Partial right hemicolectomy with ileocolic anastomosis. Diverticulosis.  PERITONEUM: No ascites.  VESSELS: Atherosclerotic changes.  RETROPERITONEUM/LYMPH NODES: No lymphadenopathy.  ABDOMINAL WALL: Within normal limits.  BONES: Degenerative changes.    IMPRESSION:  High-grade small bowel obstruction with transition point in the right anterior pelvis.    --- End of Report ---            MARIZA NEWBY MD; Attending Radiologist  This document has been electronically signed. Jul 15 2021  5:21PM    < end of copied text >      Consultant(s) Notes Reviewed:  [x] YES  [ ] NO    Care Discussed with [x] Consultants  [x] Patient  [ ] Family  [ ]      [ x] Other; RN  DVT ppx

## 2021-07-17 LAB
CULTURE RESULTS: SIGNIFICANT CHANGE UP
SPECIMEN SOURCE: SIGNIFICANT CHANGE UP

## 2021-07-17 PROCEDURE — 99232 SBSQ HOSP IP/OBS MODERATE 35: CPT

## 2021-07-17 PROCEDURE — 74018 RADEX ABDOMEN 1 VIEW: CPT | Mod: 26

## 2021-07-17 RX ORDER — LISINOPRIL 2.5 MG/1
40 TABLET ORAL DAILY
Refills: 0 | Status: DISCONTINUED | OUTPATIENT
Start: 2021-07-17 | End: 2021-07-18

## 2021-07-17 RX ORDER — AMLODIPINE BESYLATE 2.5 MG/1
5 TABLET ORAL DAILY
Refills: 0 | Status: DISCONTINUED | OUTPATIENT
Start: 2021-07-17 | End: 2021-07-18

## 2021-07-17 RX ADMIN — PANTOPRAZOLE SODIUM 40 MILLIGRAM(S): 20 TABLET, DELAYED RELEASE ORAL at 11:02

## 2021-07-17 RX ADMIN — HEPARIN SODIUM 5000 UNIT(S): 5000 INJECTION INTRAVENOUS; SUBCUTANEOUS at 05:12

## 2021-07-17 RX ADMIN — LATANOPROST 1 DROP(S): 0.05 SOLUTION/ DROPS OPHTHALMIC; TOPICAL at 21:09

## 2021-07-17 RX ADMIN — DORZOLAMIDE HYDROCHLORIDE 1 DROP(S): 20 SOLUTION/ DROPS OPHTHALMIC at 07:35

## 2021-07-17 RX ADMIN — HEPARIN SODIUM 5000 UNIT(S): 5000 INJECTION INTRAVENOUS; SUBCUTANEOUS at 17:14

## 2021-07-17 RX ADMIN — DORZOLAMIDE HYDROCHLORIDE 1 DROP(S): 20 SOLUTION/ DROPS OPHTHALMIC at 21:09

## 2021-07-17 RX ADMIN — DEXTROSE MONOHYDRATE, SODIUM CHLORIDE, AND POTASSIUM CHLORIDE 90 MILLILITER(S): 50; .745; 4.5 INJECTION, SOLUTION INTRAVENOUS at 07:34

## 2021-07-17 NOTE — PROGRESS NOTE ADULT - TIME BILLING
The total amount of time listed was spent reviewing the hospital notes, laboratory values, imaging findings, assessing/counseling the patient, discussing with consultant physicians, and nursing staff.
The total amount of time listed was spent reviewing the hospital notes, laboratory values, imaging findings, assessing/counseling the patient, discussing with consultant physicians, and nursing staff.

## 2021-07-17 NOTE — PROGRESS NOTE ADULT - SUBJECTIVE AND OBJECTIVE BOX
Patient is a 86y old  Female who presents with a chief complaint of   INTERVAL HPI/OVERNIGHT EVENTS: no events     MEDICATIONS  (STANDING):  dextrose 5% + sodium chloride 0.45% with potassium chloride 20 mEq/L 1000 milliLiter(s) (90 mL/Hr) IV Continuous <Continuous>  dorzolamide 2% Ophthalmic Solution 1 Drop(s) Left EYE <User Schedule>  heparin   Injectable 5000 Unit(s) SubCutaneous every 12 hours  latanoprost 0.005% Ophthalmic Solution 1 Drop(s) Both EYES at bedtime  pantoprazole  Injectable 40 milliGRAM(s) IV Push daily    MEDICATIONS  (PRN):  ondansetron Injectable 4 milliGRAM(s) IV Push every 6 hours PRN Nausea    Allergies    No Known Allergies    Intolerances      REVIEW OF SYSTEMS:  All other systems reviewed and are negative    Vital Signs Last 24 Hrs  T(C): 37.3 (2021 11:21), Max: 37.5 (2021 16:47)  T(F): 99.2 (2021 11:21), Max: 99.5 (2021 16:47)  HR: 73 (2021 11:21) (73 - 95)  BP: 125/73 (2021 11:21) (117/63 - 125/73)  BP(mean): --  RR: 17 (2021 11:21) (16 - 18)  SpO2: 96% (2021 11:21) (96% - 98%)  Daily     Daily   I&O's Summary    CAPILLARY BLOOD GLUCOSE        PHYSICAL EXAM:  GENERAL: NAD,    HEAD:  Atraumatic, Normocephalic  EYES: EOMI, PERRLA, conjunctiva and sclera clear  ENMT: No tonsillar erythema, exudates, or enlargement; Moist mucous membranes, Good dentition, No lesions  NECK: Supple, No JVD, Normal thyroid  NERVOUS SYSTEM:  Alert & Oriented X3, Good concentration; Motor Strength 5/5 B/L upper and lower extremities; DTRs 2+ intact and symmetric  CHEST/LUNG: Clear to percussion bilaterally; No rales, rhonchi, wheezing, or rubs  HEART: Regular rate and rhythm; No murmurs, rubs, or gallops  ABDOMEN: Soft, Nontender, Nondistended; Bowel sounds present  EXTREMITIES:  2+ Peripheral Pulses, No clubbing, cyanosis, or edema  LYMPH: No lymphadenopathy noted  SKIN: No rashes or lesions    Labs                          12.3   5.60  )-----------( 260      ( 2021 08:22 )             39.4     07-16    138  |  103  |  20  ----------------------------<  126<H>  4.4   |  28  |  1.00    Ca    9.2      2021 08:22  Phos  3.5     07-16  Mg     2.4     07-16    TPro  8.0  /  Alb  3.9  /  TBili  0.5  /  DBili  x   /  AST  25  /  ALT  19  /  AlkPhos  98  07-15    PT/INR - ( 15 Jul 2021 17:37 )   PT: 12.8 sec;   INR: 1.11 ratio         PTT - ( 15 Jul 2021 17:37 )  PTT:32.0 sec      Urinalysis Basic - ( 15 Jul 2021 22:30 )    Color: Yellow / Appearance: Clear / S.010 / pH: x  Gluc: x / Ketone: Trace  / Bili: Negative / Urobili: Negative mg/dL   Blood: x / Protein: 15 mg/dL / Nitrite: Negative   Leuk Esterase: Trace / RBC: x / WBC 0-2   Sq Epi: x / Non Sq Epi: Occasional / Bacteria: Moderate        Culture - Blood (collected 15 Jul 2021 23:00)  Source: .Blood Blood  Preliminary Report (2021 01:01):    No growth to date.                DVT prophylaxis: > Lovenox 40mg SQ daily  > Heparin   > SCD's

## 2021-07-17 NOTE — PROGRESS NOTE ADULT - SUBJECTIVE AND OBJECTIVE BOX
Patient seen and examined at bedside with Dr. Calle, pt sitting comfortably in chair. Pt states she had large liquid BMs overnight,+flatus, denies N/V  Abdominal pain is well controlled. Pt requesting something to drink.  Denies chest pain, dyspnea, cough.    T(F): 99.2 (07-17-21 @ 11:21), Max: 99.5 (07-16-21 @ 16:47)  HR: 73 (07-17-21 @ 11:21) (73 - 95)  BP: 125/73 (07-17-21 @ 11:21) (117/63 - 125/73)  RR: 17 (07-17-21 @ 11:21) (16 - 18)  SpO2: 96% (07-17-21 @ 11:21) (96% - 98%)  Wt(kg): --  CAPILLARY BLOOD GLUCOSE        PHYSICAL EXAM:  GENERAL: NAD  HEAD:  Atraumatic, Normocephalic  EYES: EOMI, PERRLA, conjunctiva and sclera clear  CHEST/LUNG: Clear to ausculation, bilaterally   HEART: S1S2  ABDOMEN: nondistended, +BS, soft, nontender, no guarding. NGT removed  EXTREMITIES:  calf soft, non tender b/l. 2+ distal pulses b/l.     LABS:    I&O's Detail    Culture Results:   No growth to date. (07-15 @ 23:00)  < from: Xray Kidney Ureter Bladder (07.17.21 @ 07:50) >  COMMENT:  On initial imaging shows an NG tube within the stomach.. The following radiograph shows contrast instilled within the stomach. The following radiograph shows contrast progressing through the small bowel. The final radiograph shows contrast throughout the entire large bowel to the level of the distal sigmoid/rectum.    There are no air-fluid levels.  There are no dilated loops of small or large bowel.  Air is seen within the small and large bowel.    There is no free air within the abdomen.  No abnormal calcifications are noted.    The osseous structures are within normal limits.    IMPRESSION:  Series of abdominal radiograph show progression of contrast instilled through an NG tube into the stomach although it to the level of the distal sigmoid/rectum.      < end of copied text >      85y/o female  pmhx of HTN, colon [id number], now with SBO- resolving    - clear liquid diet  - Antiemetics PRN  - f/u labs, replete lytes prn  -DVT prophylaxis: Heparin and IPCs  - cont medical comanagement  - discussed with Dr. Calle at bedside

## 2021-07-18 ENCOUNTER — TRANSCRIPTION ENCOUNTER (OUTPATIENT)
Age: 86
End: 2021-07-18

## 2021-07-18 VITALS
SYSTOLIC BLOOD PRESSURE: 118 MMHG | DIASTOLIC BLOOD PRESSURE: 74 MMHG | OXYGEN SATURATION: 100 % | TEMPERATURE: 99 F | RESPIRATION RATE: 17 BRPM | HEART RATE: 102 BPM

## 2021-07-18 LAB
ANION GAP SERPL CALC-SCNC: 3 MMOL/L — LOW (ref 5–17)
BUN SERPL-MCNC: 11 MG/DL — SIGNIFICANT CHANGE UP (ref 7–23)
CALCIUM SERPL-MCNC: 9.3 MG/DL — SIGNIFICANT CHANGE UP (ref 8.5–10.1)
CHLORIDE SERPL-SCNC: 107 MMOL/L — SIGNIFICANT CHANGE UP (ref 96–108)
CO2 SERPL-SCNC: 30 MMOL/L — SIGNIFICANT CHANGE UP (ref 22–31)
CREAT SERPL-MCNC: 1.08 MG/DL — SIGNIFICANT CHANGE UP (ref 0.5–1.3)
GLUCOSE SERPL-MCNC: 98 MG/DL — SIGNIFICANT CHANGE UP (ref 70–99)
HCT VFR BLD CALC: 36.4 % — SIGNIFICANT CHANGE UP (ref 34.5–45)
HGB BLD-MCNC: 11.5 G/DL — SIGNIFICANT CHANGE UP (ref 11.5–15.5)
MAGNESIUM SERPL-MCNC: 2.4 MG/DL — SIGNIFICANT CHANGE UP (ref 1.6–2.6)
MCHC RBC-ENTMCNC: 27.6 PG — SIGNIFICANT CHANGE UP (ref 27–34)
MCHC RBC-ENTMCNC: 31.6 GM/DL — LOW (ref 32–36)
MCV RBC AUTO: 87.5 FL — SIGNIFICANT CHANGE UP (ref 80–100)
NRBC # BLD: 0 /100 WBCS — SIGNIFICANT CHANGE UP (ref 0–0)
PHOSPHATE SERPL-MCNC: 2.6 MG/DL — SIGNIFICANT CHANGE UP (ref 2.5–4.5)
PLATELET # BLD AUTO: 254 K/UL — SIGNIFICANT CHANGE UP (ref 150–400)
POTASSIUM SERPL-MCNC: 4.4 MMOL/L — SIGNIFICANT CHANGE UP (ref 3.5–5.3)
POTASSIUM SERPL-SCNC: 4.4 MMOL/L — SIGNIFICANT CHANGE UP (ref 3.5–5.3)
RBC # BLD: 4.16 M/UL — SIGNIFICANT CHANGE UP (ref 3.8–5.2)
RBC # FLD: 14.8 % — HIGH (ref 10.3–14.5)
SODIUM SERPL-SCNC: 140 MMOL/L — SIGNIFICANT CHANGE UP (ref 135–145)
WBC # BLD: 4.45 K/UL — SIGNIFICANT CHANGE UP (ref 3.8–10.5)
WBC # FLD AUTO: 4.45 K/UL — SIGNIFICANT CHANGE UP (ref 3.8–10.5)

## 2021-07-18 PROCEDURE — 99232 SBSQ HOSP IP/OBS MODERATE 35: CPT

## 2021-07-18 RX ADMIN — AMLODIPINE BESYLATE 5 MILLIGRAM(S): 2.5 TABLET ORAL at 05:04

## 2021-07-18 RX ADMIN — DORZOLAMIDE HYDROCHLORIDE 1 DROP(S): 20 SOLUTION/ DROPS OPHTHALMIC at 07:57

## 2021-07-18 RX ADMIN — HEPARIN SODIUM 5000 UNIT(S): 5000 INJECTION INTRAVENOUS; SUBCUTANEOUS at 05:04

## 2021-07-18 RX ADMIN — PANTOPRAZOLE SODIUM 40 MILLIGRAM(S): 20 TABLET, DELAYED RELEASE ORAL at 11:54

## 2021-07-18 RX ADMIN — LISINOPRIL 40 MILLIGRAM(S): 2.5 TABLET ORAL at 05:04

## 2021-07-18 NOTE — PROGRESS NOTE ADULT - ASSESSMENT
85 yo lady with a pmhx of HTN, colon [id number], SBO who presents to the ED with Nausea, vomiting and abdominal pain since yesterday morning. Her pain has been a aching, intermittent, and diffuse. No chest pain, no sob, no diarrhea, no fevers or chills. Patient states these symptoms were similar to her past admission years ago in which she had to have surgery for adhesions       #SBO  Patient improving doing better today, dc plan per surgery   NGT in place  NPO, IVF  Repeat X-ray pending  Plan for possible surgery  Patient is able to tolerate 4 mets at baseline, no previous complication with anesthesia  RCRI Class 1  EKG sinus tachy, no acute ischemic changes noted   Patient is medically optimized at intermediate risk for urgent/emergent procedure    #Glaucoma  Continue eye drops    #HTN   Continue Lisinopril and Norvasc    #DVT ppx   On Heparin  Management per surgery     Thank you for allowing us to participate in the care of this patient. Our team will continue to follow along with you. Further recommendations to follow pending the clinical course.  
87 yo lady with a pmhx of HTN, colon [id number], SBO who presents to the ED with Nausea, vomiting and abdominal pain since yesterday morning. Her pain has been a aching, intermittent, and diffuse. No chest pain, no sob, no diarrhea, no fevers or chills. Patient states these symptoms were similar to her past admission years ago in which she had to have surgery for adhesions       #SBO  Patient improving  NGT in place  NPO, IVF  Repeat X-ray pending  Plan for possible surgery  Patient is able to tolerate 4 mets at baseline, no previous complication with anesthesia  RCRI Class 1  EKG sinus tachy, no acute ischemic changes noted   Patient is medically optimized at intermediate risk for urgent/emergent procedure    #Glaucoma  Continue eye drops    #HTN   Continue Lisinopril and Norvasc    #DVT ppx   On Heparin  Management per surgery     Thank you for allowing us to participate in the care of this patient. Our team will continue to follow along with you. Further recommendations to follow pending the clinical course.  
85 yo lady with a pmhx of HTN, colon [id number], SBO who presents to the ED with Nausea, vomiting and abdominal pain since yesterday morning. Her pain has been a aching, intermittent, and diffuse. No chest pain, no sob, no diarrhea, no fevers or chills. Patient states these symptoms were similar to her past admission years ago in which she had to have surgery for adhesions       #SBO  Patient improving  NGT in place  NPO, IVF  Repeat X-ray pending  Plan for possible surgery  Patient is able to tolerate 4 mets at baseline, no previous complication with anesthesia  RCRI Class 1  EKG sinus tachy, no acute ischemic changes noted   Patient is medically optimized at intermediate risk for urgent/emergent procedure    #Glaucoma  Continue eye drops    #HTN   Continue Lisinopril and Norvasc    #DVT ppx   On Heparin  Management per surgery     Thank you for allowing us to participate in the care of this patient. Our team will continue to follow along with you. Further recommendations to follow pending the clinical course.

## 2021-07-18 NOTE — PROGRESS NOTE ADULT - SUBJECTIVE AND OBJECTIVE BOX
Patient seen and examined bedside resting comfortably.  Exclaims, "I feel 100% better".  +flatus/BM.   No abd pain, nausea and vomiting. Tolerating clear liquid diet.  Denies chest pain, dyspnea, cough.    T(F): 98.5 (07-17-21 @ 23:27), Max: 99.2 (07-17-21 @ 11:21)  HR: 77 (07-17-21 @ 23:27) (73 - 83)  BP: 128/76 (07-17-21 @ 23:27) (114/70 - 128/76)  RR: 18 (07-17-21 @ 23:27) (17 - 18)  SpO2: 98% (07-17-21 @ 23:27) (96% - 99%)  Wt(kg): --    PHYSICAL EXAM:  General: NAD, WDWN  Neuro:  Alert & oriented x 3  HEENT: NCAT, EOMI, conjunctiva clear  CV: +S1+S2 regular rate and rhythm  Lung: clear to auscultation bilaterally, respirations nonlabored, good inspiratory effort  Abdomen: soft, NTND. Normoactive BS. Healed midline scar  Extremities: no pedal edema or calf tenderness noted     LABS:                        12.3   5.60  )-----------( 260      ( 16 Jul 2021 08:22 )             39.4     07-16    138  |  103  |  20  ----------------------------<  126<H>  4.4   |  28  |  1.00    Ca    9.2      16 Jul 2021 08:22  Phos  3.5     07-16  Mg     2.4     07-16      Culture Results:   >=3 organisms. Probable collection contamination. (07-16 @ 08:51)  Culture Results:   No growth to date. (07-15 @ 23:00)      A/P: 85y/o female PMH HTN, colon resection in 1998, admitted with SBO, resolved with conservative management  - advance diet  - Follow up am labs  - discharge planning home  - DVT prophylaxis: Heparin and IPCs  - appreciate medical comanagement

## 2021-07-18 NOTE — DISCHARGE NOTE PROVIDER - CARE PROVIDER_API CALL
Clement Calle (MD)  Surgery  Critical Care  [address], [address] [place] [address]  Phone: [phone]  Fax: [phone]  Follow Up Time: Routine

## 2021-07-18 NOTE — DISCHARGE NOTE PROVIDER - HOSPITAL COURSE
Patient was admitted to [facility] for abdominal pain. Patient was diagnosed with SBO. The patient was admitted to the surgical floor for observation and pain management. She was given a gastrografin challenge which caused her symptoms to resolve. She is now passing gas, having BMs and tolerating regular diet. The remainder of the hospital stay was uneventful and patient was stable for discharge.

## 2021-07-18 NOTE — DISCHARGE NOTE NURSING/CASE MANAGEMENT/SOCIAL WORK - PATIENT PORTAL LINK FT
You can access the FollowMyHealth Patient Portal offered by [facility] by registering at the following website: [email or web address]. By joining [facility] FollowMyHealth portal, you will also be able to view your health information using other applications (apps) compatible with our system.

## 2021-07-18 NOTE — DISCHARGE NOTE PROVIDER - NSDCMRMEDTOKEN_GEN_ALL_CORE_FT
dorzolamide 2% ophthalmic solution: 1 application to each affected eye 2 times a day  latanoprost 0.005% ophthalmic solution: 1 drop(s) to each affected eye once a day (in the evening)  lisinopril 40 mg oral tablet: 1 tab(s) orally once a day  Norvasc 5 mg oral tablet: 1 tab(s) orally once a day

## 2021-07-18 NOTE — DISCHARGE NOTE PROVIDER - NSDCCPCAREPLAN_GEN_ALL_CORE_FT
PRINCIPAL DISCHARGE DIAGNOSIS  Diagnosis: SBO (small bowel obstruction)  Assessment and Plan of Treatment: Please no heavy lifting  bending twisting pushing or  pulling , May Shower  no work or driving.Take OTC stool softeners as needed for constipation.  Diet as tolerated. Call the doctor if any questions or concerns. Return to ER for any emergencies.

## 2021-07-18 NOTE — PROGRESS NOTE ADULT - SUBJECTIVE AND OBJECTIVE BOX
Patient is a 86y old  Female who presents with a chief complaint of   INTERVAL HPI/OVERNIGHT EVENTS: no events     MEDICATIONS  (STANDING):  amLODIPine   Tablet 5 milliGRAM(s) Oral daily  dextrose 5% + sodium chloride 0.45% with potassium chloride 20 mEq/L 1000 milliLiter(s) (90 mL/Hr) IV Continuous <Continuous>  dorzolamide 2% Ophthalmic Solution 1 Drop(s) Left EYE <User Schedule>  heparin   Injectable 5000 Unit(s) SubCutaneous every 12 hours  latanoprost 0.005% Ophthalmic Solution 1 Drop(s) Both EYES at bedtime  lisinopril 40 milliGRAM(s) Oral daily  pantoprazole  Injectable 40 milliGRAM(s) IV Push daily    MEDICATIONS  (PRN):  ondansetron Injectable 4 milliGRAM(s) IV Push every 6 hours PRN Nausea    Allergies    No Known Allergies    Intolerances      REVIEW OF SYSTEMS:  All other systems reviewed and are negative    Vital Signs Last 24 Hrs  T(C): 36.8 (18 Jul 2021 07:04), Max: 37.3 (17 Jul 2021 11:21)  T(F): 98.3 (18 Jul 2021 07:04), Max: 99.2 (17 Jul 2021 11:21)  HR: 95 (18 Jul 2021 07:04) (73 - 95)  BP: 105/75 (18 Jul 2021 07:04) (105/75 - 128/76)  BP(mean): --  RR: 18 (18 Jul 2021 07:04) (17 - 18)  SpO2: 99% (18 Jul 2021 07:04) (96% - 99%)  Daily     Daily   I&O's Summary    17 Jul 2021 07:01  -  18 Jul 2021 07:00  --------------------------------------------------------  IN: 1930 mL / OUT: 0 mL / NET: 1930 mL    18 Jul 2021 07:01  -  18 Jul 2021 10:21  --------------------------------------------------------  IN: 240 mL / OUT: 0 mL / NET: 240 mL      CAPILLARY BLOOD GLUCOSE        PHYSICAL EXAM:  GENERAL: NAD,    HEAD:  Atraumatic, Normocephalic  EYES: EOMI, PERRLA, conjunctiva and sclera clear  ENMT: No tonsillar erythema, exudates, or enlargement; Moist mucous membranes, Good dentition, No lesions  NECK: Supple, No JVD, Normal thyroid  NERVOUS SYSTEM:  Alert & Oriented X3, Good concentration; Motor Strength 5/5 B/L upper and lower extremities; DTRs 2+ intact and symmetric  CHEST/LUNG: Clear to percussion bilaterally; No rales, rhonchi, wheezing, or rubs  HEART: Regular rate and rhythm; No murmurs, rubs, or gallops  ABDOMEN: Soft, Nontender, Nondistended; Bowel sounds present  EXTREMITIES:  2+ Peripheral Pulses, No clubbing, cyanosis, or edema  LYMPH: No lymphadenopathy noted  SKIN: No rashes or lesions    Labs                          11.5   4.45  )-----------( 254      ( 18 Jul 2021 07:52 )             36.4     07-18    140  |  107  |  11  ----------------------------<  98  4.4   |  30  |  1.08    Ca    9.3      18 Jul 2021 07:52  Phos  2.6     07-18  Mg     2.4     07-18                Culture - Urine (collected 16 Jul 2021 08:51)  Source: .Urine Clean Catch (Midstream)  Final Report (17 Jul 2021 16:27):    >=3 organisms. Probable collection contamination.    Culture - Blood (collected 15 Jul 2021 23:00)  Source: .Blood Blood  Preliminary Report (17 Jul 2021 01:01):    No growth to date.                DVT prophylaxis: > Lovenox 40mg SQ daily  > Heparin   > SCD's

## 2021-07-20 LAB
CULTURE RESULTS: SIGNIFICANT CHANGE UP
SPECIMEN SOURCE: SIGNIFICANT CHANGE UP

## 2021-07-23 DIAGNOSIS — Z90.49 ACQUIRED ABSENCE OF OTHER SPECIFIED PARTS OF DIGESTIVE TRACT: ICD-10-CM

## 2021-07-23 DIAGNOSIS — K56.609 UNSPECIFIED INTESTINAL OBSTRUCTION, UNSPECIFIED AS TO PARTIAL VERSUS COMPLETE OBSTRUCTION: ICD-10-CM

## 2021-07-23 DIAGNOSIS — Z98.42 CATARACT EXTRACTION STATUS, LEFT EYE: ICD-10-CM

## 2021-07-23 DIAGNOSIS — I10 ESSENTIAL (PRIMARY) HYPERTENSION: ICD-10-CM

## 2021-07-23 DIAGNOSIS — Z98.41 CATARACT EXTRACTION STATUS, RIGHT EYE: ICD-10-CM

## 2021-07-23 DIAGNOSIS — H40.9 UNSPECIFIED GLAUCOMA: ICD-10-CM

## 2021-07-29 ENCOUNTER — APPOINTMENT (OUTPATIENT)
Dept: SURGERY | Facility: CLINIC | Age: 86
End: 2021-07-29
Payer: MEDICARE

## 2021-07-29 VITALS
OXYGEN SATURATION: 99 % | BODY MASS INDEX: 20.65 KG/M2 | DIASTOLIC BLOOD PRESSURE: 75 MMHG | HEART RATE: 86 BPM | TEMPERATURE: 97.3 F | WEIGHT: 118 LBS | HEIGHT: 63.5 IN | SYSTOLIC BLOOD PRESSURE: 134 MMHG

## 2021-07-29 PROCEDURE — 99024 POSTOP FOLLOW-UP VISIT: CPT

## 2021-07-29 NOTE — ASSESSMENT
[FreeTextEntry1] : MS. Acuna, presented with most likely adhesive SBO given her extensive colonic surgeries. Advised her to seek medical attention if symptoms of nausea, vomiting, anorexia, abdominal pain persist.

## 2021-07-29 NOTE — HISTORY OF PRESENT ILLNESS
[de-identified] : MS. Acuna recently admitted with SBO, conservative treatment resolved.  [de-identified] : MS. Acuna recently admitted with SBO, conservative treatment resolved. 10 PT ROS OTW negative.

## 2022-01-09 NOTE — H&P ADULT - NSHPROSALLOTHERNEGRD_GEN_ALL_CORE
Problem: Falls - Risk of:  Goal: Will remain free from falls  Description: Will remain free from falls  Outcome: Ongoing  Goal: Absence of physical injury  Description: Absence of physical injury  Outcome: Ongoing All other review of systems negative, except as noted in HPI

## 2022-10-21 ENCOUNTER — INPATIENT (INPATIENT)
Facility: HOSPITAL | Age: 87
LOS: 2 days | Discharge: HOME | End: 2022-10-24
Attending: HOSPITALIST | Admitting: HOSPITALIST

## 2022-10-21 VITALS
HEART RATE: 110 BPM | HEIGHT: 63 IN | SYSTOLIC BLOOD PRESSURE: 140 MMHG | OXYGEN SATURATION: 99 % | TEMPERATURE: 98 F | WEIGHT: 111.99 LBS | DIASTOLIC BLOOD PRESSURE: 74 MMHG | RESPIRATION RATE: 20 BRPM

## 2022-10-21 DIAGNOSIS — R31.9 HEMATURIA, UNSPECIFIED: ICD-10-CM

## 2022-10-21 DIAGNOSIS — H26.9 UNSPECIFIED CATARACT: ICD-10-CM

## 2022-10-21 DIAGNOSIS — D62 ACUTE POSTHEMORRHAGIC ANEMIA: ICD-10-CM

## 2022-10-21 DIAGNOSIS — M19.90 UNSPECIFIED OSTEOARTHRITIS, UNSPECIFIED SITE: ICD-10-CM

## 2022-10-21 DIAGNOSIS — R79.1 ABNORMAL COAGULATION PROFILE: ICD-10-CM

## 2022-10-21 DIAGNOSIS — I10 ESSENTIAL (PRIMARY) HYPERTENSION: ICD-10-CM

## 2022-10-21 DIAGNOSIS — I82.409 ACUTE EMBOLISM AND THROMBOSIS OF UNSPECIFIED DEEP VEINS OF UNSPECIFIED LOWER EXTREMITY: ICD-10-CM

## 2022-10-21 LAB
ANION GAP SERPL CALC-SCNC: 8 MMOL/L — SIGNIFICANT CHANGE UP (ref 5–17)
APTT BLD: 92.1 SEC — HIGH (ref 27.5–35.5)
BASOPHILS # BLD AUTO: 0.02 K/UL — SIGNIFICANT CHANGE UP (ref 0–0.2)
BASOPHILS NFR BLD AUTO: 0.5 % — SIGNIFICANT CHANGE UP (ref 0–2)
BUN SERPL-MCNC: 21 MG/DL — SIGNIFICANT CHANGE UP (ref 7–23)
CALCIUM SERPL-MCNC: 9.6 MG/DL — SIGNIFICANT CHANGE UP (ref 8.5–10.1)
CHLORIDE SERPL-SCNC: 105 MMOL/L — SIGNIFICANT CHANGE UP (ref 96–108)
CO2 SERPL-SCNC: 26 MMOL/L — SIGNIFICANT CHANGE UP (ref 22–31)
CREAT SERPL-MCNC: 0.92 MG/DL — SIGNIFICANT CHANGE UP (ref 0.5–1.3)
EGFR: 60 ML/MIN/1.73M2 — SIGNIFICANT CHANGE UP
EOSINOPHIL # BLD AUTO: 0.04 K/UL — SIGNIFICANT CHANGE UP (ref 0–0.5)
EOSINOPHIL NFR BLD AUTO: 1 % — SIGNIFICANT CHANGE UP (ref 0–6)
FLUAV AG NPH QL: SIGNIFICANT CHANGE UP
FLUBV AG NPH QL: SIGNIFICANT CHANGE UP
GLUCOSE SERPL-MCNC: 89 MG/DL — SIGNIFICANT CHANGE UP (ref 70–99)
HCT VFR BLD CALC: 30 % — LOW (ref 34.5–45)
HGB BLD-MCNC: 9.8 G/DL — LOW (ref 11.5–15.5)
IMM GRANULOCYTES NFR BLD AUTO: 0.2 % — SIGNIFICANT CHANGE UP (ref 0–0.9)
INR BLD: 1.36 RATIO — HIGH (ref 0.88–1.16)
INR BLD: 11.37 RATIO — CRITICAL HIGH (ref 0.88–1.16)
LYMPHOCYTES # BLD AUTO: 1.01 K/UL — SIGNIFICANT CHANGE UP (ref 1–3.3)
LYMPHOCYTES # BLD AUTO: 24.4 % — SIGNIFICANT CHANGE UP (ref 13–44)
MCHC RBC-ENTMCNC: 27.5 PG — SIGNIFICANT CHANGE UP (ref 27–34)
MCHC RBC-ENTMCNC: 32.7 G/DL — SIGNIFICANT CHANGE UP (ref 32–36)
MCV RBC AUTO: 84.3 FL — SIGNIFICANT CHANGE UP (ref 80–100)
MONOCYTES # BLD AUTO: 0.37 K/UL — SIGNIFICANT CHANGE UP (ref 0–0.9)
MONOCYTES NFR BLD AUTO: 8.9 % — SIGNIFICANT CHANGE UP (ref 2–14)
NEUTROPHILS # BLD AUTO: 2.69 K/UL — SIGNIFICANT CHANGE UP (ref 1.8–7.4)
NEUTROPHILS NFR BLD AUTO: 65 % — SIGNIFICANT CHANGE UP (ref 43–77)
NRBC # BLD: 0 /100 WBCS — SIGNIFICANT CHANGE UP (ref 0–0)
PLATELET # BLD AUTO: 280 K/UL — SIGNIFICANT CHANGE UP (ref 150–400)
POTASSIUM SERPL-MCNC: 4 MMOL/L — SIGNIFICANT CHANGE UP (ref 3.5–5.3)
POTASSIUM SERPL-SCNC: 4 MMOL/L — SIGNIFICANT CHANGE UP (ref 3.5–5.3)
PROTHROM AB SERPL-ACNC: 139.8 SEC — HIGH (ref 10.5–13.4)
PROTHROM AB SERPL-ACNC: 16.4 SEC — HIGH (ref 10.5–13.4)
RBC # BLD: 3.56 M/UL — LOW (ref 3.8–5.2)
RBC # FLD: 14.8 % — HIGH (ref 10.3–14.5)
SARS-COV-2 RNA SPEC QL NAA+PROBE: SIGNIFICANT CHANGE UP
SODIUM SERPL-SCNC: 139 MMOL/L — SIGNIFICANT CHANGE UP (ref 135–145)
WBC # BLD: 4.14 K/UL — SIGNIFICANT CHANGE UP (ref 3.8–10.5)
WBC # FLD AUTO: 4.14 K/UL — SIGNIFICANT CHANGE UP (ref 3.8–10.5)

## 2022-10-21 PROCEDURE — 93010 ELECTROCARDIOGRAM REPORT: CPT

## 2022-10-21 PROCEDURE — 71045 X-RAY EXAM CHEST 1 VIEW: CPT | Mod: 26

## 2022-10-21 PROCEDURE — 99285 EMERGENCY DEPT VISIT HI MDM: CPT

## 2022-10-21 PROCEDURE — 99222 1ST HOSP IP/OBS MODERATE 55: CPT

## 2022-10-21 RX ORDER — LATANOPROST 0.05 MG/ML
1 SOLUTION/ DROPS OPHTHALMIC; TOPICAL AT BEDTIME
Refills: 0 | Status: DISCONTINUED | OUTPATIENT
Start: 2022-10-21 | End: 2022-10-24

## 2022-10-21 RX ORDER — PROTHROMBIN COMPLEX CONCENTRATE (HUMAN) 25.5; 16.5; 24; 22; 22; 26 [IU]/ML; [IU]/ML; [IU]/ML; [IU]/ML; [IU]/ML; [IU]/ML
1500 POWDER, FOR SOLUTION INTRAVENOUS ONCE
Refills: 0 | Status: COMPLETED | OUTPATIENT
Start: 2022-10-21 | End: 2022-10-21

## 2022-10-21 RX ORDER — AMLODIPINE BESYLATE 2.5 MG/1
5 TABLET ORAL DAILY
Refills: 0 | Status: DISCONTINUED | OUTPATIENT
Start: 2022-10-21 | End: 2022-10-24

## 2022-10-21 RX ORDER — DORZOLAMIDE HYDROCHLORIDE 20 MG/ML
1 SOLUTION/ DROPS OPHTHALMIC EVERY 8 HOURS
Refills: 0 | Status: DISCONTINUED | OUTPATIENT
Start: 2022-10-21 | End: 2022-10-24

## 2022-10-21 RX ORDER — LANOLIN ALCOHOL/MO/W.PET/CERES
3 CREAM (GRAM) TOPICAL AT BEDTIME
Refills: 0 | Status: DISCONTINUED | OUTPATIENT
Start: 2022-10-21 | End: 2022-10-24

## 2022-10-21 RX ORDER — ACETAMINOPHEN 500 MG
650 TABLET ORAL EVERY 6 HOURS
Refills: 0 | Status: DISCONTINUED | OUTPATIENT
Start: 2022-10-21 | End: 2022-10-24

## 2022-10-21 RX ORDER — ONDANSETRON 8 MG/1
4 TABLET, FILM COATED ORAL EVERY 8 HOURS
Refills: 0 | Status: DISCONTINUED | OUTPATIENT
Start: 2022-10-21 | End: 2022-10-24

## 2022-10-21 RX ORDER — PHYTONADIONE (VIT K1) 5 MG
5 TABLET ORAL ONCE
Refills: 0 | Status: COMPLETED | OUTPATIENT
Start: 2022-10-21 | End: 2022-10-21

## 2022-10-21 RX ORDER — LISINOPRIL 2.5 MG/1
40 TABLET ORAL DAILY
Refills: 0 | Status: DISCONTINUED | OUTPATIENT
Start: 2022-10-21 | End: 2022-10-24

## 2022-10-21 RX ADMIN — Medication 101 MILLIGRAM(S): at 17:08

## 2022-10-21 RX ADMIN — PROTHROMBIN COMPLEX CONCENTRATE (HUMAN) 400 INTERNATIONAL UNIT(S): 25.5; 16.5; 24; 22; 22; 26 POWDER, FOR SOLUTION INTRAVENOUS at 17:38

## 2022-10-21 NOTE — H&P ADULT - HISTORY OF PRESENT ILLNESS
This is an 88 yo F with HTN, OA, cataracts and LE DVT for which she was started on coumadin 5 mg daily 2 w ago, sent to ED from [facility] for INR greater than 8 and hematuria x 3 days. has no complaints, denies f/c, h/a, cp, palpitations, abd pain, syncope, n/v/d/c, melena, hematochezia or any other bleeding. reports regular diet and did not take extra coumadin in ED vitals are stable, hgb 9.8, INT 11.37, pt 139.8, PTT 92.1. given Phytonadione and KCentra, INR now 1.26 and pt 16.4

## 2022-10-21 NOTE — ED ADULT NURSE NOTE - NSIMPLEMENTINTERV_GEN_ALL_ED
Implemented All Fall with Harm Risk Interventions:  [place] to call system. Call bell, personal items and telephone within reach. Instruct patient to call for assistance. Room bathroom lighting operational. Non-slip footwear when patient is off stretcher. Physically safe environment: no spills, clutter or unnecessary equipment. Stretcher in lowest position, wheels locked, appropriate side rails in place. Provide visual cue, wrist band, yellow gown, etc. Monitor gait and stability. Monitor for mental status changes and reorient to person, place, and time. Review medications for side effects contributing to fall risk. Reinforce activity limits and safety measures with patient and family. Provide visual clues: red socks.

## 2022-10-21 NOTE — ED ADULT NURSE NOTE - WAS YOUR LAST COVID-19 VACCINE GREATER THAN OR EQUAL TO TWO MONTHS AGO?
[facility] Dermatology Note  Encounter Date: Sep 28, 2022  Office Visit     Dermatology Problem List:  # FBSE, 9/28/2022  1. Partially ruptured folliculitis, L medial calf s/p shave biopsy 6/25/19  2. 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 Hx of pancreas, s/p 10/29/07  4. Hx of kidney transplant, s/p 3/28/07  ____________________________________________    Assessment & Plan:    # NUB, L nose. Lesion concerning for squamous cell carinoma:   Non healing, bleeding, erythematous lesion on nose since summer of 2022. Immunosuppressed s/p kidney transplant places at elevated risk.   -shave biopsy, results pending    # Benign lesions: Multiple benign nevi, solar lentigos, seborrheic keratoses, cherry angiomas. Explained to patient benign nature of lesion. No treatment is necessary at this time unless the lesion changes or becomes symptomatic.   - ABCDs of melanoma were discussed and self skin checks were advised.  - Sun precaution was advised including the use of sun screens of SPF 30 or higher, sun protective clothing, and avoidance of tanning beds.    # HX of pancreas/kidney transplant   - Continue annual skin exams  - Monitor for any changing lesions     Procedures Performed:   - Shave biopsy procedure note, location(s): nose. After discussion of benefits and risks including but not limited to bleeding, infection, scar, incomplete removal, recurrence, and non-diagnostic biopsy, written consent and photographs were obtained. The area was cleaned with isopropyl alcohol. 0.5mL of 1% lidocaine with epinephrine was injected to obtain adequate anesthesia of lesion(s). Shave biopsy at site(s) performed. Hemostasis was achieved with aluminium chloride. Petrolatum ointment and a sterile dressing were applied. The patient tolerated the procedure and no complications were noted. The patient was provided with verbal and written post care instructions.      Follow-up: pending path results    Staff, Resident and Scribe:     Anastasiya Pinto MD  PGY-2 Family Medicine Resident    Scribe Disclosure:  I, MARVIN DREW, am serving as a scribe to document services personally performed by Cuong Erickson MD based on data collection and the provider's statements to me.     Provider Disclosure:   The documentation recorded by the scribe accurately reflects the services I personally performed and the decisions made by me.    Staff Physician Comments:   I saw and evaluated the patient with the resident and I agree with the assessment and plan.  I was present for the key portions of the above major procedure and examination.    Cuong Erickson MD  Pronouns: he/him/his    Department of Dermatology  [facility]: Phone: [phone], Fax:[phone]  [facility]: Phone: [phone] Fax: [phone]  ____________________________________________    CC: Skin Check (Jose Alfredo is here for a skin check and has a spot of concern on his nose. )    HPI:  Mr. Jose Alfredo Tomlinson is a(n) 60 year old male who presents today as a new patient for FBSE. Last seen by Dr. Carvalho on 6/25/19, at which time patient underwent a shave biopsy for treatment of an NUB on the left medial calf, which returned as partially ruptured folliculitis.    Today, patient is here for a spot check on his nose. The spot first appeared sometime this summer. Patient thought it was a pimple initially, but it did not regress. Bleeds every few days. Not increasing in size but not regressing.     The patient otherwise denies any new or concerning lesions. No bleeding, painful, pruritic, or changing lesions. They report negative personal history of skin cancer. There is family history of skin cancer in both maternal and paternal grandfathers, unclear of exact type. Current history of immunosuppression  secondary to kidney transplant. They do use sunscreen and protective clothing when outdoors for sun protection. Health otherwise stable. No other skin concerns.     Patient is otherwise feeling well, without additional skin concerns.    Labs Reviewed:  N/A    Physical Exam:  Vitals: There were no vitals taken for this visit.  SKIN: Focused examination of nose was performed. As well as a total skin exam including the face, trunk, limbs, groin and buttocks  - 1.5mm erythematous macule with multiple shallow blood vessels noted  - Multiple regular brown pigmented macules and papules are identified on the trunk and extremities.   - Scattered brown macules on sun exposed areas.  - There are waxy stuck on tan to brown papules on the trunk and extremities.   -tinea pedis noted on bilateral heels  - No other lesions of concern on areas examined.     Medications:  Current Outpatient Medications   Medication     aspirin (ASPIRIN LOW DOSE) 81 MG EC tablet     atorvastatin (LIPITOR) 10 MG tablet     CELLCEPT (BRAND) 500 MG tablet     omeprazole (PRILOSEC) 20 MG DR capsule     ondansetron (ZOFRAN-ODT) 4 MG ODT tab     order for DME     PROGRAF (BRAND) 0.5 MG capsule     PROGRAF (BRAND) 1 MG capsule     sodium bicarbonate 650 MG tablet     vitamin C (ASCORBIC ACID) 500 MG tablet     vitamin C (ASCORBIC ACID) 500 MG tablet     Vitamin D3 (CHOLECALCIFEROL) 25 mcg (1000 units) tablet     Current Facility-Administered Medications   Medication     lidocaine 1% with EPINEPHrine 1:100,000 injection 3 mL      Past Medical History:   Patient Active Problem List   Diagnosis     Hyperlipidemia     Peripheral neuropathy     PAD (peripheral artery disease) (H)     Seborrheic keratosis     Acquired perforating dermatosis     Atrial bigeminy     Kidney replaced by transplant     Pancreas replaced by transplant (H)     HTN, kidney transplant related     Diabetic neuropathy, type I diabetes mellitus (H)     Tactile anesthesia     Personal history  of diabetic foot ulcer     Right foot drop     Wrist drop, bilateral     Immunosuppression (H)     Past Medical History:   Diagnosis Date     Cataracts      Foot drop      Gastroparesis      GERD (gastroesophageal reflux disease)      History of diabetes mellitus, type I      Hyperlipidemia      Hypertension      Pancreas transplanted (H) 2007    hx of rejection in past      Peripheral neuropathy      S/P kidney transplant 2007        Yes

## 2022-10-21 NOTE — H&P ADULT - ASSESSMENT
This is an 88 yo F with HTN, OA, cataracts and LE DVT for which she was started on coumadin 5 mg daily 2 w ago, sent to ED from [facility] for INR greater than 8 and hematuria x 3 days. has no complaints, denies f/c, h/a, cp, palpitations, abd pain, syncope, n/v/d/c, melena, hematochezia or any other bleeding. reports regular diet and did not take extra coumadin in ED vitals are stable, hgb 9.8, INT 11.37, pt 139.8, PTT 92.1. given Phytonadione and KCentra, INR now 1.26 and pt 16.4    supratherapeutic INR   hematuria  blood loss anemia   hx dvt on coumadin  htn   oa  cataracts  -INR now wnl. check tomorrow AM and consider starting DOAC for DVT when hematuria resolves  -monitor hgb  -cta abd/pelvis pending   -resume home meds  -regular diet, scds

## 2022-10-21 NOTE — ED ADULT NURSE NOTE - OBJECTIVE STATEMENT
Pt presents to the ED c/o hematuria. Pt sent in from [facility] for INR >8. Pt reporting hematuria x3 days and lower back pain. Pt takes coumadin 5mg qd for DVT. Denies cp, sob, diff breathing, n/v/d, uti sx

## 2022-10-21 NOTE — ED ADULT TRIAGE NOTE - CHIEF COMPLAINT QUOTE
Hematuria x 3 days. Prescribed Coumadin for DVT 2 weeks ago. Sent by PCP today because INR was 8. Denies any pain at this time.

## 2022-10-21 NOTE — ED PROVIDER NOTE - OBJECTIVE STATEMENT
Pt referred to ED from [facility] for INR greater than 8 and hematuria.  Pt noted hematuria x 3 days. No fever, no chest pain, no shortness of breath, no dysuria.  Pt states has chronic low back pain due to arthritis. Denies flank pain.   Pt take Coumadin 5mg qd for hx of DVT.  Meds: Keflex, FeSO4 325mg, Colace, Norvasc, Lisinopril, Coumadin 5mg. Pt referred to ED from [facility] for INR greater than 8 and hematuria.  Pt noted hematuria x 3 days. No fever, no chest pain, no shortness of breath, no dysuria.  Pt states has chronic low back pain which pt attributes to her arthritis. Denies flank pain.   Pt take Coumadin 5mg qd for hx of DVT.  Meds: Keflex, FeSO4 325mg, Colace, Norvasc, Lisinopril, Coumadin 5mg.

## 2022-10-22 LAB
ANION GAP SERPL CALC-SCNC: 6 MMOL/L — SIGNIFICANT CHANGE UP (ref 5–17)
APPEARANCE UR: ABNORMAL
APTT BLD: 32 SEC — SIGNIFICANT CHANGE UP (ref 27.5–35.5)
BACTERIA # UR AUTO: ABNORMAL
BILIRUB UR-MCNC: NEGATIVE — SIGNIFICANT CHANGE UP
BUN SERPL-MCNC: 23 MG/DL — SIGNIFICANT CHANGE UP (ref 7–23)
CALCIUM SERPL-MCNC: 8.9 MG/DL — SIGNIFICANT CHANGE UP (ref 8.5–10.1)
CHLORIDE SERPL-SCNC: 108 MMOL/L — SIGNIFICANT CHANGE UP (ref 96–108)
CO2 SERPL-SCNC: 29 MMOL/L — SIGNIFICANT CHANGE UP (ref 22–31)
COLOR SPEC: ABNORMAL
CREAT SERPL-MCNC: 0.9 MG/DL — SIGNIFICANT CHANGE UP (ref 0.5–1.3)
DIFF PNL FLD: ABNORMAL
EGFR: 62 ML/MIN/1.73M2 — SIGNIFICANT CHANGE UP
EPI CELLS # UR: SIGNIFICANT CHANGE UP
GLUCOSE SERPL-MCNC: 82 MG/DL — SIGNIFICANT CHANGE UP (ref 70–99)
GLUCOSE UR QL: NEGATIVE MG/DL — SIGNIFICANT CHANGE UP
HCT VFR BLD CALC: 28 % — LOW (ref 34.5–45)
HGB BLD-MCNC: 8.9 G/DL — LOW (ref 11.5–15.5)
INR BLD: 1.23 RATIO — HIGH (ref 0.88–1.16)
KETONES UR-MCNC: ABNORMAL
LEUKOCYTE ESTERASE UR-ACNC: ABNORMAL
MCHC RBC-ENTMCNC: 27.1 PG — SIGNIFICANT CHANGE UP (ref 27–34)
MCHC RBC-ENTMCNC: 31.8 G/DL — LOW (ref 32–36)
MCV RBC AUTO: 85.4 FL — SIGNIFICANT CHANGE UP (ref 80–100)
NITRITE UR-MCNC: NEGATIVE — SIGNIFICANT CHANGE UP
NRBC # BLD: 0 /100 WBCS — SIGNIFICANT CHANGE UP (ref 0–0)
PH UR: 6 — SIGNIFICANT CHANGE UP (ref 5–8)
PLATELET # BLD AUTO: 275 K/UL — SIGNIFICANT CHANGE UP (ref 150–400)
POTASSIUM SERPL-MCNC: 3.6 MMOL/L — SIGNIFICANT CHANGE UP (ref 3.5–5.3)
POTASSIUM SERPL-SCNC: 3.6 MMOL/L — SIGNIFICANT CHANGE UP (ref 3.5–5.3)
PROT UR-MCNC: 30 MG/DL
PROTHROM AB SERPL-ACNC: 14.7 SEC — HIGH (ref 10.5–13.4)
RBC # BLD: 3.28 M/UL — LOW (ref 3.8–5.2)
RBC # FLD: 15.1 % — HIGH (ref 10.3–14.5)
RBC CASTS # UR COMP ASSIST: >50 /HPF (ref 0–4)
SODIUM SERPL-SCNC: 143 MMOL/L — SIGNIFICANT CHANGE UP (ref 135–145)
SP GR SPEC: 1.01 — SIGNIFICANT CHANGE UP (ref 1.01–1.02)
UROBILINOGEN FLD QL: 1 MG/DL
WBC # BLD: 3.77 K/UL — LOW (ref 3.8–10.5)
WBC # FLD AUTO: 3.77 K/UL — LOW (ref 3.8–10.5)
WBC UR QL: SIGNIFICANT CHANGE UP

## 2022-10-22 PROCEDURE — 99233 SBSQ HOSP IP/OBS HIGH 50: CPT

## 2022-10-22 PROCEDURE — 74174 CTA ABD&PLVS W/CONTRAST: CPT | Mod: 26

## 2022-10-22 RX ORDER — INFLUENZA VIRUS VACCINE 15; 15; 15; 15 UG/.5ML; UG/.5ML; UG/.5ML; UG/.5ML
0.7 SUSPENSION INTRAMUSCULAR ONCE
Refills: 0 | Status: COMPLETED | OUTPATIENT
Start: 2022-10-22 | End: 2022-10-24

## 2022-10-22 RX ORDER — APIXABAN 2.5 MG/1
10 TABLET, FILM COATED ORAL EVERY 12 HOURS
Refills: 0 | Status: DISCONTINUED | OUTPATIENT
Start: 2022-10-22 | End: 2022-10-24

## 2022-10-22 RX ADMIN — DORZOLAMIDE HYDROCHLORIDE 1 DROP(S): 20 SOLUTION/ DROPS OPHTHALMIC at 21:22

## 2022-10-22 RX ADMIN — DORZOLAMIDE HYDROCHLORIDE 1 DROP(S): 20 SOLUTION/ DROPS OPHTHALMIC at 06:03

## 2022-10-22 RX ADMIN — AMLODIPINE BESYLATE 5 MILLIGRAM(S): 2.5 TABLET ORAL at 06:03

## 2022-10-22 RX ADMIN — APIXABAN 10 MILLIGRAM(S): 2.5 TABLET, FILM COATED ORAL at 17:30

## 2022-10-22 RX ADMIN — LATANOPROST 1 DROP(S): 0.05 SOLUTION/ DROPS OPHTHALMIC; TOPICAL at 21:22

## 2022-10-22 RX ADMIN — DORZOLAMIDE HYDROCHLORIDE 1 DROP(S): 20 SOLUTION/ DROPS OPHTHALMIC at 16:53

## 2022-10-22 RX ADMIN — LISINOPRIL 40 MILLIGRAM(S): 2.5 TABLET ORAL at 06:03

## 2022-10-22 NOTE — PHYSICAL THERAPY INITIAL EVALUATION ADULT - PERTINENT HX OF CURRENT PROBLEM, REHAB EVAL
As per ED provider notes, A 87 year F with HTN, OA, cataracts and LE DVT for which she was started on coumadin 5 mg daily 2 w ago, sent to ED from [facility] for INR greater than 8 and hematuria x 3 days. has no complaints, denies f/c, h/a, cp, palpitations, abd pain, syncope, n/v/d/c, melena, hematochezia or any other bleeding. reports regular diet and did not take extra coumadin in ED vitals are stable

## 2022-10-22 NOTE — PATIENT PROFILE ADULT - FALL HARM RISK - HARM RISK INTERVENTIONS
Assistance with ambulation/Assistance OOB with selected safe patient handling equipment/Communicate Risk of Fall with Harm to all staff/Discuss with provider need for PT consult/Monitor gait and stability/Reinforce activity limits and safety measures with patient and family/Tailored Fall Risk Interventions/Visual Cue: Yellow wristband and red socks/Bed in lowest position, wheels locked, appropriate side rails in place/Call bell, personal items and telephone in reach/Instruct patient to call for assistance before getting out of bed or chair/Non-slip footwear when patient is out of bed/[place] to call system/Physically safe environment - no spills, clutter or unnecessary equipment/Purposeful Proactive Rounding/Room/bathroom lighting operational, light cord in reach

## 2022-10-22 NOTE — PHYSICAL THERAPY INITIAL EVALUATION ADULT - ADDITIONAL COMMENTS
As per pt lives alone [place] private house, to enter stairs with 3 steps and 1 side hand rails, reports to be previously independent in all mobility with a straight cane . Denies any falls within 6 months.

## 2022-10-22 NOTE — PATIENT PROFILE ADULT - NSPROPTRIGHTSUPPORTNAME_GEN_A_NUR
[facility] Hospital Medicine  Progress Note    Patient Name: Courtney Engle  MRN: [id number]  Patient Class: IP- Inpatient   Admission Date: 4/20/2017  Length of Stay: 2 days  Attending Physician: Agnes Kay MD  Primary Care Provider: Vishal Ulloa MD    [facility] Medicine Team: [facility] B Agnes Kay MD    Subjective:     Principal Problem:Hypertensive emergency    HPI:  Patient is a 80 y.o. female with htn, hld intolerant to statins due to gi upset, iddm2, morbid obesity, GERD/hiatal hernia, HFpEF 65% presenting with hypertensive emergency with pulmonary edema, VERA and LE swelling, found to have hypertensive crisis in the ED.     Per ED: presents with 1 week of dyspnea on exertion and leg swelling. She notes that this past Sunday she went grocery shopping. She came back home and while taking her groceries into her house she had to stop several times due to shortness of breath. She states that at the same time she noticed her legs began to swell. The swelling would recede with leg elevation. Since Sunday her symptoms have been progressively worsening where now even on walking 20 ft to the bathroom i witnessed her getting SOB. She had no SOB prior to this. She does recall some chest pain but it is mostly related to food intake and she states she has a history of hiatal hernia. She does not check her blood pressure or weight herself daily at home. She notes no PND/orthopnea. When she presented to the ED today her bp was around 240 systolic. She has a very poor understanding of lifestyle effects on her blood pressure and eats a high salt, high sugar diet with no control over her iddm.      Her PMH is sig for htn, hld intolerant to statins due to gi upset, iddm2, morbid obesity, GERD/hiatal hernia, HFpEF 65%         Hospital Course:  4/21 -   She was gven lasix and diuresed over night. Atenolol changed to coreg.  BP 130s this am and pt feels dizzy,  Held more BP meds this am to  bring BP down slower,  4/22 - feeling better, dyspnea improved, [id number], discussed polypharmacy- she c/o dry mouth.           Interval History: SOB and VERA has improved     Review of Systems   Constitutional: Negative for activity change. Appetite change: poor         Obese  VERA with walking to BR   HENT: Positive for rhinorrhea.         Dry moth  rhinorhea   Respiratory: Negative for cough, chest tightness and shortness of breath.    Cardiovascular: Negative for chest pain and leg swelling.   Gastrointestinal: Negative for abdominal distention, abdominal pain and vomiting.        + GERD and HH   Genitourinary: Negative for difficulty urinating, dysuria, flank pain, frequency (after lasix), hematuria and pelvic pain.        Has not voided yet today   Musculoskeletal: Negative for arthralgias and back pain.   Skin: Negative for pallor, rash and wound.   Neurological: Negative for speech difficulty and weakness.   Hematological: Does not bruise/bleed easily.   Psychiatric/Behavioral: Negative for agitation, behavioral problems and sleep disturbance.     Objective:     Vital Signs (Most Recent):  Temp: 97.8 °F (36.6 °C) (04/22/17 0800)  Pulse: 66 (04/22/17 1400)  Resp: 19 (04/22/17 1200)  BP: (!) 166/99 (04/22/17 1200)  SpO2: (!) 91 % (04/22/17 1200) Vital Signs (24h Range):  Temp:  [97.8 °F (36.6 °C)-98.8 °F (37.1 °C)] 97.8 °F (36.6 °C)  Pulse:  [56-92] 66  Resp:  [18-24] 19  SpO2:  [90 %-95 %] 91 %  BP: (149-178)/() 166/99     Weight: 96.4 kg (212 lb 8.4 oz)  Body mass index is 44.42 kg/(m^2).    Intake/Output Summary (Last 24 hours) at 04/22/17 1429  Last data filed at 04/22/17 0600   Gross per 24 hour   Intake              280 ml   Output             1100 ml   Net             -820 ml      Physical Exam   Constitutional: She is oriented to person, place, and time. She appears well-developed and well-nourished.   HENT:   Head: Normocephalic.   Mouth/Throat: Oropharynx is clear and moist.   Eyes: Conjunctivae  are normal. Pupils are equal, round, and reactive to light. No scleral icterus.   Neck: Normal range of motion. Neck supple.   Cardiovascular: Normal rate, regular rhythm, normal heart sounds and intact distal pulses.  Exam reveals no gallop and no friction rub.    No murmur heard.  Pulmonary/Chest: Effort normal and breath sounds normal. No stridor. No respiratory distress. She has no wheezes. She has no rales. She exhibits no tenderness.   Abdominal: Soft. Bowel sounds are normal. She exhibits no distension and no mass. There is no tenderness. There is no rebound and no guarding.   Musculoskeletal: Normal range of motion. She exhibits no edema or tenderness.   Lymphadenopathy:     She has no cervical adenopathy.   Neurological: She is alert and oriented to person, place, and time. She has normal strength.   Skin: Skin is warm and dry. No rash noted.   Psychiatric: She has a normal mood and affect. Her speech is normal and behavior is normal. Judgment and thought content normal. Cognition and memory are normal.   Nursing note and vitals reviewed.       Significant Labs:   CBC:   Recent Labs  Lab 04/20/17 1942 04/21/17 0435 04/22/17  0646   WBC 9.71 9.51 6.90   HGB 12.1 12.1 12.2   HCT 36.1* 35.7* 36.9*    301 328     CMP:   Recent Labs  Lab 04/20/17 1942 04/21/17 0435 04/22/17  0646    143 141   K 4.2 3.4* 4.0    101 96   CO2 29 31* 36*   * 240* 227*   BUN 11 12 23   CREATININE 0.9 0.9 1.1   CALCIUM 9.2 8.9 9.2   PROT 7.5 7.1 7.2   ALBUMIN 3.4* 3.2* 3.3*   BILITOT 0.3 0.4 0.5   ALKPHOS 94 86 86   AST 14 10 11   ALT 7* 7* 8*   ANIONGAP 7* 11 9   EGFRNONAA >60.0 >60.0 47.5*         Assessment/Plan:      * Hypertensive emergency  4/22 - 166/99  4/21 - resolved,  With pulmonary edema  Echo - pending  Lasix 20 qd, lisinopri l0 qd, coreg 12.5 qd.  Received iv hydralazine  And iv lasix which worked well  Low salt diet      Type 2 diabetes mellitus with hyperglycemia, with long-term current  use of insulin  Recent Labs      04/21/17   0012  04/21/17   0204  04/21/17   0903  04/21/17   1555  04/21/17   2049  04/21/17   2338   POCTGLUCOSE  157*  217*  308*  282*  210*  250*     A1c 8.2  levemir 25 q hs  Increase aspart 6 tid    CKD stage 3 due to type 2 diabetes mellitus  Cr 0.9 -> 1.1  Reduced uring output on 4/22    Essential hypertension  4/22- Adjust meds  discussed compliance  She does not have regular sleep hours      Acquired hypothyroidism  Continue synthroid  tsh ok    Morbid obesity with BMI of 40.0-44.9, adult        Acute heart failure  Echo + diastolic dysfunction, EF 60%  Consistent with hypertensive heart diseases      VTE Risk Mitigation         Ordered     heparin (porcine) injection 5,000 Units  Every 8 hours     Route:  Subcutaneous        04/21/17 0110     Medium Risk of VTE  Once      04/20/17 6787          Agnes Kay MD  Department of Hospital Medicine   [facility]   Michelle Mccollum

## 2022-10-22 NOTE — PHYSICAL THERAPY INITIAL EVALUATION ADULT - GENERAL OBSERVATIONS, REHAB EVAL
Chart reviewed, Pt found in semireclining in bed, NAD, pt vitals stable, + hep lock, + cardiac monitor

## 2022-10-23 PROCEDURE — 99233 SBSQ HOSP IP/OBS HIGH 50: CPT

## 2022-10-23 RX ORDER — APIXABAN 2.5 MG/1
1 TABLET, FILM COATED ORAL
Qty: 60 | Refills: 0
Start: 2022-10-23 | End: 2022-11-21

## 2022-10-23 RX ADMIN — AMLODIPINE BESYLATE 5 MILLIGRAM(S): 2.5 TABLET ORAL at 05:03

## 2022-10-23 RX ADMIN — DORZOLAMIDE HYDROCHLORIDE 1 DROP(S): 20 SOLUTION/ DROPS OPHTHALMIC at 21:27

## 2022-10-23 RX ADMIN — APIXABAN 10 MILLIGRAM(S): 2.5 TABLET, FILM COATED ORAL at 17:15

## 2022-10-23 RX ADMIN — APIXABAN 10 MILLIGRAM(S): 2.5 TABLET, FILM COATED ORAL at 05:01

## 2022-10-23 RX ADMIN — LATANOPROST 1 DROP(S): 0.05 SOLUTION/ DROPS OPHTHALMIC; TOPICAL at 21:27

## 2022-10-23 RX ADMIN — DORZOLAMIDE HYDROCHLORIDE 1 DROP(S): 20 SOLUTION/ DROPS OPHTHALMIC at 13:15

## 2022-10-23 RX ADMIN — LISINOPRIL 40 MILLIGRAM(S): 2.5 TABLET ORAL at 05:02

## 2022-10-23 RX ADMIN — DORZOLAMIDE HYDROCHLORIDE 1 DROP(S): 20 SOLUTION/ DROPS OPHTHALMIC at 05:04

## 2022-10-23 NOTE — PROGRESS NOTE ADULT - NSPROGADDITIONALINFOA_GEN_ALL_CORE
script sent  discussed with CM  [facility] closed today   will have to reassess in am
will try apixiban and [facility]  patient will need 3 month supply  will dw with CM

## 2022-10-23 NOTE — PROGRESS NOTE ADULT - SUBJECTIVE AND OBJECTIVE BOX
patient seen and examined  feels well  on apixiban bid   sent scripts to pharm. notified CM  Review of Systems:  General:denies fever chills, headache, weakness  HEENT: denies blurry vision,diffculty swallowing, difficulty hearing, tinnitus  Cardiovascular: denies chest pain  ,palpitations  Pulmonary:denies shortness of breath, cough, wheezing, hemoptysis  Gastrointestinal: denies abdominal pain, constipation, diarrhea,nausea , vomiting, hematochezia  : denies hematuria, dysuria, or incontinence  Neurological: denies weakness, numbness , tingling, dizziness, tremors  MSK: denies muscle pain, difficulty ambulating, swelling, back pain  skin: denies skin rash, itching, burning, or  skin lesions  Psychiatrical: denies mood disturbances, anxierty, feeling depressed, depression , or difficulty sleeping    Objective:  Vitals  T(C): 36.7 (10-23-22 @ 10:22), Max: 36.9 (10-22-22 @ 17:10)  HR: 85 (10-23-22 @ 10:22) (79 - 87)  BP: 103/61 (10-23-22 @ 10:22) (103/61 - 117/67)  RR: 17 (10-23-22 @ 10:22) (17 - 18)  SpO2: 99% (10-23-22 @ 10:22) (98% - 99%)    Physical Exam:  General: comfortable, no acute distress, well nourished  HEENT: Atraumatic, no LAD, trachea midline, PERRLA  Cardiovascular: normal s1s2, no murmurs, gallops or fricition rubs  Pulmonary: clear to ausculation Bilaterally, no wheezing , rhonchi  Gastrointestinal: soft non tender non distended, no masses felt, no organomegally  Muscloskeletal: no lower extremity edema, intact bilateral lower extremity pulses  Neurological: CN II-12 intact. No focal weakness  Psychiatrical: normal mood, cooperative  SKIN: no rash, lesions or ulcers    Labs:                          8.9    3.77  )-----------( 275      ( 22 Oct 2022 07:45 )             28.0     10-22    143  |  108  |  23  ----------------------------<  82  3.6   |  29  |  0.90    Ca    8.9      22 Oct 2022 07:45        PT/INR - ( 22 Oct 2022 07:45 )   PT: 14.7 sec;   INR: 1.23 ratio         PTT - ( 22 Oct 2022 07:45 )  PTT:32.0 sec      Active Medications  MEDICATIONS  (STANDING):  amLODIPine   Tablet 5 milliGRAM(s) Oral daily  apixaban 10 milliGRAM(s) Oral every 12 hours  dorzolamide 2% Ophthalmic Solution 1 Drop(s) Both EYES every 8 hours  influenza  Vaccine (HIGH DOSE) 0.7 milliLiter(s) IntraMuscular once  latanoprost 0.005% Ophthalmic Solution 1 Drop(s) Both EYES at bedtime  lisinopril 40 milliGRAM(s) Oral daily    MEDICATIONS  (PRN):  acetaminophen     Tablet .. 650 milliGRAM(s) Oral every 6 hours PRN Temp greater or equal to 38C (100.4F), Mild Pain (1 - 3)  aluminum hydroxide/magnesium hydroxide/simethicone Suspension 30 milliLiter(s) Oral every 4 hours PRN Dyspepsia  melatonin 3 milliGRAM(s) Oral at bedtime PRN Insomnia  ondansetron Injectable 4 milliGRAM(s) IV Push every 8 hours PRN Nausea and/or Vomiting    
patient seen and examned  feells well  ambulating to bathroom  iNR 1.3   does not want warfarin if possible  [facility]. tried xarelto (did not like it)  Review of Systems:  General:denies fever chills, headache, weakness  HEENT: denies blurry vision,diffculty swallowing, difficulty hearing, tinnitus  Cardiovascular: denies chest pain  ,palpitations  Pulmonary:denies shortness of breath, cough, wheezing, hemoptysis  Gastrointestinal: denies abdominal pain, constipation, diarrhea,nausea , vomiting, hematochezia  : denies hematuria, dysuria, or incontinence  Neurological: denies weakness, numbness , tingling, dizziness, tremors  MSK: denies muscle pain, difficulty ambulating, swelling, back pain  skin: denies skin rash, itching, burning, or  skin lesions  Psychiatrical: denies mood disturbances, anxierty, feeling depressed, depression , or difficulty sleeping    Objective:  Vitals  T(C): 36.7 (10-23-22 @ 10:22), Max: 36.9 (10-22-22 @ 17:10)  HR: 85 (10-23-22 @ 10:22) (79 - 87)  BP: 103/61 (10-23-22 @ 10:22) (103/61 - 117/67)  RR: 17 (10-23-22 @ 10:22) (17 - 18)  SpO2: 99% (10-23-22 @ 10:22) (98% - 99%)    Physical Exam:  General: comfortable, no acute distress, well nourished  HEENT: Atraumatic, no LAD, trachea midline, PERRLA  Cardiovascular: normal s1s2, no murmurs, gallops or fricition rubs  Pulmonary: clear to ausculation Bilaterally, no wheezing , rhonchi  Gastrointestinal: soft non tender non distended, no masses felt, no organomegally  Muscloskeletal: no lower extremity edema, intact bilateral lower extremity pulses  Neurological: CN II-12 intact. No focal weakness  Psychiatrical: normal mood, cooperative  SKIN: no rash, lesions or ulcers    Labs:                          8.9    3.77  )-----------( 275      ( 22 Oct 2022 07:45 )             28.0     10-22    143  |  108  |  23  ----------------------------<  82  3.6   |  29  |  0.90    Ca    8.9      22 Oct 2022 07:45        PT/INR - ( 22 Oct 2022 07:45 )   PT: 14.7 sec;   INR: 1.23 ratio         PTT - ( 22 Oct 2022 07:45 )  PTT:32.0 sec      Active Medications  MEDICATIONS  (STANDING):  amLODIPine   Tablet 5 milliGRAM(s) Oral daily  apixaban 10 milliGRAM(s) Oral every 12 hours  dorzolamide 2% Ophthalmic Solution 1 Drop(s) Both EYES every 8 hours  influenza  Vaccine (HIGH DOSE) 0.7 milliLiter(s) IntraMuscular once  latanoprost 0.005% Ophthalmic Solution 1 Drop(s) Both EYES at bedtime  lisinopril 40 milliGRAM(s) Oral daily    MEDICATIONS  (PRN):  acetaminophen     Tablet .. 650 milliGRAM(s) Oral every 6 hours PRN Temp greater or equal to 38C (100.4F), Mild Pain (1 - 3)  aluminum hydroxide/magnesium hydroxide/simethicone Suspension 30 milliLiter(s) Oral every 4 hours PRN Dyspepsia  melatonin 3 milliGRAM(s) Oral at bedtime PRN Insomnia  ondansetron Injectable 4 milliGRAM(s) IV Push every 8 hours PRN Nausea and/or Vomiting

## 2022-10-23 NOTE — PROGRESS NOTE ADULT - ASSESSMENT
This is an 88 yo F with HTN, OA, cataracts and LE DVT for which she was started on coumadin 5 mg daily 2 w ago, sent to ED from [facility] for INR greater than 8 and hematuria x 3 days. has no complaints, denies f/c, h/a, cp, palpitations, abd pain, syncope, n/v/d/c, melena, hematochezia or any other bleeding. reports regular diet and did not take extra coumadin in ED vitals are stable, hgb 9.8, INT 11.37, pt 139.8, PTT 92.1. given Phytonadione and KCentra, INR now 1.26 and pt 16.4    supratherapeutic INR   hematuria  blood loss anemia   hx dvt on coumadin  htn   oa  cataracts  -INR now wnl. check tomorrow AM and consider starting DOAC for DVT when hematuria resolves  -monitor hgb  -cta abd/pelvis pending   -resume home meds  -regular diet, scds  
This is an 88 yo F with HTN, OA, cataracts and LE DVT for which she was started on coumadin 5 mg daily 2 w ago, sent to ED from [facility] for INR greater than 8 and hematuria x 3 days. has no complaints, denies f/c, h/a, cp, palpitations, abd pain, syncope, n/v/d/c, melena, hematochezia or any other bleeding. reports regular diet and did not take extra coumadin in ED vitals are stable, hgb 9.8, INT 11.37, pt 139.8, PTT 92.1. given Phytonadione and KCentra, INR now 1.26 and pt 16.4    supratherapeutic INR   hematuria  blood loss anemia   hx dvt on coumadin  htn   oa  cataracts  -INR now wnl. check tomorrow AM and consider starting DOAC for DVT when hematuria resolves  -monitor hgb  -cta abd/pelvis pending   -resume home meds  -regular diet, scds

## 2022-10-24 ENCOUNTER — TRANSCRIPTION ENCOUNTER (OUTPATIENT)
Age: 87
End: 2022-10-24

## 2022-10-24 VITALS
DIASTOLIC BLOOD PRESSURE: 67 MMHG | TEMPERATURE: 98 F | OXYGEN SATURATION: 100 % | RESPIRATION RATE: 18 BRPM | SYSTOLIC BLOOD PRESSURE: 109 MMHG | HEART RATE: 93 BPM

## 2022-10-24 LAB
CULTURE RESULTS: SIGNIFICANT CHANGE UP
SPECIMEN SOURCE: SIGNIFICANT CHANGE UP

## 2022-10-24 PROCEDURE — 93306 TTE W/DOPPLER COMPLETE: CPT | Mod: 26

## 2022-10-24 PROCEDURE — 99239 HOSP IP/OBS DSCHRG MGMT >30: CPT

## 2022-10-24 RX ORDER — APIXABAN 2.5 MG/1
1 TABLET, FILM COATED ORAL
Qty: 60 | Refills: 0
Start: 2022-10-24 | End: 2022-11-22

## 2022-10-24 RX ORDER — APIXABAN 2.5 MG/1
1 TABLET, FILM COATED ORAL
Qty: 74 | Refills: 0
Start: 2022-10-24

## 2022-10-24 RX ADMIN — APIXABAN 10 MILLIGRAM(S): 2.5 TABLET, FILM COATED ORAL at 17:08

## 2022-10-24 RX ADMIN — INFLUENZA VIRUS VACCINE 0.7 MILLILITER(S): 15; 15; 15; 15 SUSPENSION INTRAMUSCULAR at 16:40

## 2022-10-24 RX ADMIN — DORZOLAMIDE HYDROCHLORIDE 1 DROP(S): 20 SOLUTION/ DROPS OPHTHALMIC at 05:17

## 2022-10-24 RX ADMIN — APIXABAN 10 MILLIGRAM(S): 2.5 TABLET, FILM COATED ORAL at 05:18

## 2022-10-24 RX ADMIN — DORZOLAMIDE HYDROCHLORIDE 1 DROP(S): 20 SOLUTION/ DROPS OPHTHALMIC at 15:00

## 2022-10-24 NOTE — DISCHARGE NOTE PROVIDER - NSDCCPCAREPLAN_GEN_ALL_CORE_FT
PRINCIPAL DISCHARGE DIAGNOSIS  Diagnosis: Elevated INR  Assessment and Plan of Treatment: Please start eliquis      SECONDARY DISCHARGE DIAGNOSES  Diagnosis: Hematuria  Assessment and Plan of Treatment:

## 2022-10-24 NOTE — DISCHARGE NOTE PROVIDER - HOSPITAL COURSE
86 yo F with HTN, OA, cataracts and LE DVT for which she was started on coumadin 5 mg daily 2 w ago, sent to ED from [facility] for INR greater than 8 and hematuria x 3 days. has no complaints, denies f/c, h/a, cp, palpitations, abd pain, syncope, n/v/d/c, melena, hematochezia or any other bleeding. reports regular diet and did not take extra coumadin in ED vitals are stable, hgb 9.8, INT 11.37, pt 139.8, PTT 92.1. given Phytonadione and KCentra, INR now 1.26 and pt 16.4    supratherapeutic INR   hematuria  blood loss anemia   hx dvt on coumadin  htn   oa  cataracts  -INR now wnl. check tomorrow AM and consider starting DOAC for DVT when hematuria resolves  -monitor hgb  -cta abd/pelvis pending   -resume home meds  -regular diet,

## 2022-10-24 NOTE — CHART NOTE - NSCHARTNOTEFT_GEN_A_CORE
apixiban 5 mg BID was ordered at [facility].   Free 30 day prescription coupon [facility].   Patient is aware. staff notified.

## 2022-10-24 NOTE — DISCHARGE NOTE PROVIDER - ATTENDING DISCHARGE PHYSICAL EXAMINATION:
Objective:    Vitals:  T(C): 36.5 (10-24-22 @ 10:44), Max: 37.1 (10-23-22 @ 16:50)  HR: 90 (10-24-22 @ 10:44) (65 - 102)  BP: 127/72 (10-24-22 @ 10:44) (103/59 - 127/72)  RR: 18 (10-24-22 @ 10:44) (18 - 19)  SpO2: 100% (10-24-22 @ 10:44) (100% - 100%)    Physical Exam:  General: comfortable, no acute distress, well nourished  HEENT: Atraumatic, no LAD, trachea midline, PERRLA  Cardiovascular: normal s1s2,  +++murmurs, gallops or fricition rubs  Pulmonary: clear to ausculation Bilaterally, no wheezing , rhonchi  Gastrointestinal: soft non tender non distended, no masses felt, no organomegally  Muscloskeletal: no lower extremity edema, intact bilateral lower extremity pulses  Neurological: CN II-12 intact. No focal weakness  Psychiatrical: normal mood, cooperative  SKIN: no rash, lesions or ulcers

## 2022-10-24 NOTE — DISCHARGE NOTE PROVIDER - NSDCMRMEDTOKEN_GEN_ALL_CORE_FT
apixaban 5 mg oral tablet: 2  tab(s) orally 2 times a day for 6 days  1 tab twice a day thereafter  dorzolamide 2% ophthalmic solution: 1 application to each affected eye 2 times a day  latanoprost 0.005% ophthalmic solution: 1 drop(s) to each affected eye once a day (in the evening)  lisinopril 40 mg oral tablet: 1 tab(s) orally once a day  Norvasc 5 mg oral tablet: 1 tab(s) orally once a day

## 2022-10-24 NOTE — DISCHARGE NOTE NURSING/CASE MANAGEMENT/SOCIAL WORK - NSDCPEFALRISK_GEN_ALL_CORE
For information on Fall & Injury Prevention, visit: [email or web address] OR  [email or web address] OR  https://www.cdc.gov/steadi/patient.html

## 2022-10-24 NOTE — DISCHARGE NOTE NURSING/CASE MANAGEMENT/SOCIAL WORK - PATIENT PORTAL LINK FT
You can access the FollowMyHealth Patient Portal offered by [facility] by registering at the following website: [email or web address]. By joining [facility]’s FollowMyHealth portal, you will also be able to view your health information using other applications (apps) compatible with our system.

## 2022-10-24 NOTE — DISCHARGE NOTE NURSING/CASE MANAGEMENT/SOCIAL WORK - NSDCPEELIQUIS_GEN_ALL_CORE
Apixaban/Eliquis - Compliance/Apixaban/Eliquis - Dietary Advice/Apixaban/Eliquis - Follow up monitoring/Apixaban/Eliquis - Potential for adverse drug reactions and interactions 02-Nov-2020 12:31

## 2022-10-30 DIAGNOSIS — M54.9 DORSALGIA, UNSPECIFIED: ICD-10-CM

## 2022-10-30 DIAGNOSIS — R31.9 HEMATURIA, UNSPECIFIED: ICD-10-CM

## 2022-10-30 DIAGNOSIS — I10 ESSENTIAL (PRIMARY) HYPERTENSION: ICD-10-CM

## 2022-10-30 DIAGNOSIS — M19.90 UNSPECIFIED OSTEOARTHRITIS, UNSPECIFIED SITE: ICD-10-CM

## 2022-10-30 DIAGNOSIS — H26.9 UNSPECIFIED CATARACT: ICD-10-CM

## 2022-10-30 DIAGNOSIS — D68.32 HEMORRHAGIC DISORDER DUE TO EXTRINSIC CIRCULATING ANTICOAGULANTS: ICD-10-CM

## 2022-10-30 DIAGNOSIS — Z90.49 ACQUIRED ABSENCE OF OTHER SPECIFIED PARTS OF DIGESTIVE TRACT: ICD-10-CM

## 2022-10-30 DIAGNOSIS — G89.29 OTHER CHRONIC PAIN: ICD-10-CM

## 2022-10-30 DIAGNOSIS — I82.402 ACUTE EMBOLISM AND THROMBOSIS OF UNSPECIFIED DEEP VEINS OF LEFT LOWER EXTREMITY: ICD-10-CM

## 2022-10-30 DIAGNOSIS — D62 ACUTE POSTHEMORRHAGIC ANEMIA: ICD-10-CM

## 2022-12-30 NOTE — ED ADULT NURSE NOTE - NS PRO PASSIVE SMOKE EXP
Pt's first and last name and birthday confirmed.   LOC: The patient is awake, alert and aware of environment with an appropriate affect, the patient is oriented x 3 and speaking appropriately.  APPEARANCE: Patient resting comfortably and in no acute distress, patient is clean and well groomed.  SKIN: The skin is warm and dry, patient has normal skin turgor and moist mucus membranes, skin intact, no breakdown or brusing noted.  MUSKULOSKELETAL: Patient moving all extremities well, no obvious swelling or deformities noted.  RESPIRATORY: Airway is open and patent, respirations are spontaneous, patient has a normal effort and rate.   CARDIAC: Normal heart sounds. No peripheral edema.  ABDOMEN: +bloating; intermittent diarrhea  NEURO: No neuro deficits,  no drift noted, no facial droop noted. Speech is clear. Ambulating with normal gait.    No

## 2023-04-06 ENCOUNTER — INPATIENT (INPATIENT)
Facility: HOSPITAL | Age: 88
LOS: 4 days | Discharge: HOME HEALTH | End: 2023-04-11
Attending: STUDENT IN AN ORGANIZED HEALTH CARE EDUCATION/TRAINING PROGRAM | Admitting: STUDENT IN AN ORGANIZED HEALTH CARE EDUCATION/TRAINING PROGRAM
Payer: MEDICARE

## 2023-04-06 VITALS
HEART RATE: 113 BPM | DIASTOLIC BLOOD PRESSURE: 61 MMHG | OXYGEN SATURATION: 100 % | TEMPERATURE: 101 F | SYSTOLIC BLOOD PRESSURE: 142 MMHG | RESPIRATION RATE: 16 BRPM

## 2023-04-06 DIAGNOSIS — R55 SYNCOPE AND COLLAPSE: ICD-10-CM

## 2023-04-06 LAB
ALBUMIN SERPL ELPH-MCNC: 4.1 G/DL — SIGNIFICANT CHANGE UP (ref 3.3–5)
ALP SERPL-CCNC: 140 U/L — HIGH (ref 40–120)
ALT FLD-CCNC: 20 U/L — SIGNIFICANT CHANGE UP (ref 4–33)
ANION GAP SERPL CALC-SCNC: 14 MMOL/L — SIGNIFICANT CHANGE UP (ref 7–14)
APPEARANCE UR: CLEAR — SIGNIFICANT CHANGE UP
AST SERPL-CCNC: 63 U/L — HIGH (ref 4–32)
BASE EXCESS BLDV CALC-SCNC: 2.3 MMOL/L — SIGNIFICANT CHANGE UP (ref -2–3)
BASOPHILS # BLD AUTO: 0.02 K/UL — SIGNIFICANT CHANGE UP (ref 0–0.2)
BASOPHILS NFR BLD AUTO: 0.4 % — SIGNIFICANT CHANGE UP (ref 0–2)
BILIRUB SERPL-MCNC: 0.4 MG/DL — SIGNIFICANT CHANGE UP (ref 0.2–1.2)
BILIRUB UR-MCNC: NEGATIVE — SIGNIFICANT CHANGE UP
BLOOD GAS VENOUS COMPREHENSIVE RESULT: SIGNIFICANT CHANGE UP
BUN SERPL-MCNC: 19 MG/DL — SIGNIFICANT CHANGE UP (ref 7–23)
CALCIUM SERPL-MCNC: 9.9 MG/DL — SIGNIFICANT CHANGE UP (ref 8.4–10.5)
CHLORIDE BLDV-SCNC: 100 MMOL/L — SIGNIFICANT CHANGE UP (ref 96–108)
CHLORIDE SERPL-SCNC: 102 MMOL/L — SIGNIFICANT CHANGE UP (ref 98–107)
CO2 BLDV-SCNC: 29.2 MMOL/L — HIGH (ref 22–26)
CO2 SERPL-SCNC: 24 MMOL/L — SIGNIFICANT CHANGE UP (ref 22–31)
COLOR SPEC: SIGNIFICANT CHANGE UP
CREAT SERPL-MCNC: 0.76 MG/DL — SIGNIFICANT CHANGE UP (ref 0.5–1.3)
D DIMER BLD IA.RAPID-MCNC: 255 NG/ML DDU — HIGH
DIFF PNL FLD: NEGATIVE — SIGNIFICANT CHANGE UP
EGFR: 75 ML/MIN/1.73M2 — SIGNIFICANT CHANGE UP
EOSINOPHIL # BLD AUTO: 0 K/UL — SIGNIFICANT CHANGE UP (ref 0–0.5)
EOSINOPHIL NFR BLD AUTO: 0 % — SIGNIFICANT CHANGE UP (ref 0–6)
FLUAV AG NPH QL: SIGNIFICANT CHANGE UP
FLUBV AG NPH QL: SIGNIFICANT CHANGE UP
GAS PNL BLDV: 136 MMOL/L — SIGNIFICANT CHANGE UP (ref 136–145)
GLUCOSE BLDV-MCNC: 91 MG/DL — SIGNIFICANT CHANGE UP (ref 70–99)
GLUCOSE SERPL-MCNC: 93 MG/DL — SIGNIFICANT CHANGE UP (ref 70–99)
GLUCOSE UR QL: NEGATIVE — SIGNIFICANT CHANGE UP
HCO3 BLDV-SCNC: 28 MMOL/L — SIGNIFICANT CHANGE UP (ref 22–29)
HCT VFR BLD CALC: 36.6 % — SIGNIFICANT CHANGE UP (ref 34.5–45)
HCT VFR BLDA CALC: 36 % — SIGNIFICANT CHANGE UP (ref 34.5–46.5)
HGB BLD CALC-MCNC: 12 G/DL — SIGNIFICANT CHANGE UP (ref 11.7–16.1)
HGB BLD-MCNC: 11.7 G/DL — SIGNIFICANT CHANGE UP (ref 11.5–15.5)
IANC: 3.51 K/UL — SIGNIFICANT CHANGE UP (ref 1.8–7.4)
IMM GRANULOCYTES NFR BLD AUTO: 0.4 % — SIGNIFICANT CHANGE UP (ref 0–0.9)
KETONES UR-MCNC: ABNORMAL
LACTATE BLDV-MCNC: 2.9 MMOL/L — HIGH (ref 0.5–2)
LEUKOCYTE ESTERASE UR-ACNC: NEGATIVE — SIGNIFICANT CHANGE UP
LIDOCAIN IGE QN: 21 U/L — SIGNIFICANT CHANGE UP (ref 7–60)
LYMPHOCYTES # BLD AUTO: 1 K/UL — SIGNIFICANT CHANGE UP (ref 1–3.3)
LYMPHOCYTES # BLD AUTO: 19.8 % — SIGNIFICANT CHANGE UP (ref 13–44)
MAGNESIUM SERPL-MCNC: 2.3 MG/DL — SIGNIFICANT CHANGE UP (ref 1.6–2.6)
MCHC RBC-ENTMCNC: 26.9 PG — LOW (ref 27–34)
MCHC RBC-ENTMCNC: 32 GM/DL — SIGNIFICANT CHANGE UP (ref 32–36)
MCV RBC AUTO: 84.1 FL — SIGNIFICANT CHANGE UP (ref 80–100)
MONOCYTES # BLD AUTO: 0.49 K/UL — SIGNIFICANT CHANGE UP (ref 0–0.9)
MONOCYTES NFR BLD AUTO: 9.7 % — SIGNIFICANT CHANGE UP (ref 2–14)
NEUTROPHILS # BLD AUTO: 3.51 K/UL — SIGNIFICANT CHANGE UP (ref 1.8–7.4)
NEUTROPHILS NFR BLD AUTO: 69.7 % — SIGNIFICANT CHANGE UP (ref 43–77)
NITRITE UR-MCNC: NEGATIVE — SIGNIFICANT CHANGE UP
NRBC # BLD: 0 /100 WBCS — SIGNIFICANT CHANGE UP (ref 0–0)
NRBC # FLD: 0 K/UL — SIGNIFICANT CHANGE UP (ref 0–0)
PCO2 BLDV: 46 MMHG — SIGNIFICANT CHANGE UP (ref 39–52)
PH BLDV: 7.39 — SIGNIFICANT CHANGE UP (ref 7.32–7.43)
PH UR: 7 — SIGNIFICANT CHANGE UP (ref 5–8)
PLATELET # BLD AUTO: 323 K/UL — SIGNIFICANT CHANGE UP (ref 150–400)
PO2 BLDV: 24 MMHG — LOW (ref 25–45)
POTASSIUM BLDV-SCNC: 3.9 MMOL/L — SIGNIFICANT CHANGE UP (ref 3.5–5.1)
POTASSIUM SERPL-MCNC: 4.7 MMOL/L — SIGNIFICANT CHANGE UP (ref 3.5–5.3)
POTASSIUM SERPL-SCNC: 4.7 MMOL/L — SIGNIFICANT CHANGE UP (ref 3.5–5.3)
PROT SERPL-MCNC: 7.5 G/DL — SIGNIFICANT CHANGE UP (ref 6–8.3)
PROT UR-MCNC: ABNORMAL
RBC # BLD: 4.35 M/UL — SIGNIFICANT CHANGE UP (ref 3.8–5.2)
RBC # FLD: 16 % — HIGH (ref 10.3–14.5)
RSV RNA NPH QL NAA+NON-PROBE: SIGNIFICANT CHANGE UP
SAO2 % BLDV: 32.9 % — LOW (ref 67–88)
SARS-COV-2 RNA SPEC QL NAA+PROBE: DETECTED
SODIUM SERPL-SCNC: 140 MMOL/L — SIGNIFICANT CHANGE UP (ref 135–145)
SP GR SPEC: 1.02 — SIGNIFICANT CHANGE UP (ref 1.01–1.05)
TROPONIN T, HIGH SENSITIVITY RESULT: 65 NG/L — CRITICAL HIGH
TROPONIN T, HIGH SENSITIVITY RESULT: 68 NG/L — CRITICAL HIGH
UROBILINOGEN FLD QL: SIGNIFICANT CHANGE UP
WBC # BLD: 5.04 K/UL — SIGNIFICANT CHANGE UP (ref 3.8–10.5)
WBC # FLD AUTO: 5.04 K/UL — SIGNIFICANT CHANGE UP (ref 3.8–10.5)

## 2023-04-06 PROCEDURE — 72170 X-RAY EXAM OF PELVIS: CPT | Mod: 26

## 2023-04-06 PROCEDURE — 70450 CT HEAD/BRAIN W/O DYE: CPT | Mod: 26,MA

## 2023-04-06 PROCEDURE — 99285 EMERGENCY DEPT VISIT HI MDM: CPT | Mod: CS

## 2023-04-06 PROCEDURE — 73590 X-RAY EXAM OF LOWER LEG: CPT | Mod: 26,50

## 2023-04-06 PROCEDURE — 99223 1ST HOSP IP/OBS HIGH 75: CPT

## 2023-04-06 PROCEDURE — 71045 X-RAY EXAM CHEST 1 VIEW: CPT | Mod: 26

## 2023-04-06 RX ORDER — SODIUM CHLORIDE 9 MG/ML
1000 INJECTION, SOLUTION INTRAVENOUS ONCE
Refills: 0 | Status: COMPLETED | OUTPATIENT
Start: 2023-04-06 | End: 2023-04-06

## 2023-04-06 RX ORDER — PIPERACILLIN AND TAZOBACTAM 4; .5 G/20ML; G/20ML
3.38 INJECTION, POWDER, LYOPHILIZED, FOR SOLUTION INTRAVENOUS ONCE
Refills: 0 | Status: COMPLETED | OUTPATIENT
Start: 2023-04-06 | End: 2023-04-06

## 2023-04-06 RX ORDER — ASPIRIN/CALCIUM CARB/MAGNESIUM 324 MG
162 TABLET ORAL ONCE
Refills: 0 | Status: COMPLETED | OUTPATIENT
Start: 2023-04-06 | End: 2023-04-06

## 2023-04-06 RX ORDER — ACETAMINOPHEN 500 MG
975 TABLET ORAL ONCE
Refills: 0 | Status: COMPLETED | OUTPATIENT
Start: 2023-04-06 | End: 2023-04-06

## 2023-04-06 RX ADMIN — Medication 162 MILLIGRAM(S): at 19:35

## 2023-04-06 RX ADMIN — PIPERACILLIN AND TAZOBACTAM 200 GRAM(S): 4; .5 INJECTION, POWDER, LYOPHILIZED, FOR SOLUTION INTRAVENOUS at 17:34

## 2023-04-06 RX ADMIN — SODIUM CHLORIDE 1000 MILLILITER(S): 9 INJECTION, SOLUTION INTRAVENOUS at 16:32

## 2023-04-06 RX ADMIN — Medication 975 MILLIGRAM(S): at 17:35

## 2023-04-06 NOTE — ED ADULT NURSE NOTE - OBJECTIVE STATEMENT
Break RN: Pt is a 87 y/o Female, A&Ox2 to self and place, with a hx of HTN and arthritis. Pt presents to the ED from home with c/o not feeling well this morning. Pt had reports attempting to stand from chair but sliding down to floor. Pt denies hitting head, LOC, or use of blood thinners. Pt denies chest pain, SOB, fever, cough, chills, abdominal pain, N/V/D or any urinary symptoms at this time. Pt poor historian and appears lethargic at this time. Respirations even and unlabored, chest rise equal b/l. Sinus tachycardia noted on cardiac monitor. Abdomen soft, nontender, nondistended. VS as noted in flow sheets. Safety maintained throughout. Will continue to monitor. Break RN: Pt is a 89 y/o Female, A&Ox2 to self and place, with a hx of HTN and arthritis. Pt presents to the ED from home with c/o not feeling well this morning. Pt had reports attempting to stand from chair but sliding down to floor. Pt denies hitting head, LOC, or use of blood thinners. Pt denies chest pain, SOB, fever, cough, chills, abdominal pain, N/V/D or any urinary symptoms at this time. Pt poor historian and appears lethargic at this time. Respirations even and unlabored, chest rise equal b/l. Sinus tachycardia noted on cardiac monitor. Abdomen soft, nontender, nondistended. VS as noted in flow sheets. Safety maintained throughout. Will continue to monitor. Break RN: Pt is a 89 y/o Female, A&Ox2 to self and place, with a hx of HTN. Pt presents to the ED from home with c/o not feeling well this morning. Pt reports attempting to stand from chair but sliding down to floor because she felt weak. Pt denies hitting head, LOC, or use of blood thinners. Pt denies chest pain, SOB, fever, cough, chills, abdominal pain, N/V/D or any urinary symptoms at this time. Pt poor historian and appears lethargic at this time. Respirations even and unlabored, chest rise equal b/l. Sinus tachycardia noted on cardiac monitor. Abdomen soft, nontender, nondistended. VS as noted in flow sheets. 20g IVL placed in LAC. Labs collected and sent. Fluids administered as ordered, see MAR. Pt stable upon leaving bedside. Safety maintained throughout. Report given to primary RN. Break RN: Pt is a 87 y/o Female, A&Ox2 to self and place, with a hx of HTN. Pt presents to the ED from home with c/o not feeling well this morning. Pt reports attempting to stand from chair but sliding down to floor because she felt weak. Pt denies hitting head, LOC, or use of blood thinners. Pt denies chest pain, SOB, fever, cough, chills, abdominal pain, N/V/D or any urinary symptoms at this time. Pt poor historian and appears lethargic at this time. Respirations even and unlabored, chest rise equal b/l. Sinus tachycardia noted on cardiac monitor. Abdomen soft, nontender, nondistended. VS as noted in flow sheets. 20g IVL placed in LAC. Labs collected and sent. Fluids administered as ordered, see MAR. Pt stable upon leaving bedside. Safety maintained throughout. Report given to primary RN.

## 2023-04-06 NOTE — H&P ADULT - PROBLEM SELECTOR PLAN 6
Initial Hgb 11.7, repeat Hgb in AM now 8.9. Unclear baseline Hgb, but Hgb 9.8 on 10/21/22 and 8.9 on 10/22/22. Possibly initial Hgb due to hemoconcentration, but given recent fall, can be in setting of bleed, including RP bleed.  - CT C/A/P with IV contrast ordered  - Check FOBT with rectal exam  - Check iron studies, folate, vitamin B12  - Monitor H/H, transfuse for Hgb <7

## 2023-04-06 NOTE — H&P ADULT - NSHPREVIEWOFSYSTEMS_GEN_ALL_CORE
Constitutional: +Generalized weakness. No fevers, chills, or weight loss  Eyes: No visual changes, blurry or double vision, or eye pain  Ears, Nose, Mouth, Throat: No runny nose, sinus pain, ear pain, tinnitus, sore throat, dysphagia, or odynophagia  Cardiovascular: No chest pain, palpitations, or LE edema  Respiratory: No cough, wheezing, hemoptysis, or shortness of breath  Gastrointestinal: No abdominal pain, nausea/vomiting, diarrhea/constipation, hematemesis, melena, or BRBPR  Genitourinary: No dysuria, frequency, urgency, or hematuria  Musculoskeletal: No back pain or joint pain, swelling, or decreased ROM  Skin: No pruritus or rashes  Neurologic: No syncope, seizures, headaches, paresthesias, numbness, or limb weakness  Psychiatric: No depression, anxiety, or agitation  Endocrine: No heat/cold intolerance, mood swings, sweats, polydipsia, or polyuria  Hematologic/lymphatic: No purpura, petechia, or prolonged or excessive bleeding after dental extraction / injury  Allergic/Immunologic: No anaphylaxis or allergic response to materials, foods, animals    Positives and pertinent negatives noted and all other systems negative. Never

## 2023-04-06 NOTE — ED PROVIDER NOTE - PROGRESS NOTE DETAILS
S Alex PGY2 received sign out on this pt pending admission for unwitnessed syncope found to be covid+ and febrile s/p empiric abx. accepted to tele pending rpt trop and ecg (initial without obvious ST changes, however significant artifact). will call [phone] for significant delta trop and ecg if any changes. S Alex PGY2 received sign out on this pt pending admission for unwitnessed syncope found to be covid+ and febrile s/p empiric abx. accepted to tele pending rpt trop and ecg (initial without obvious ST changes, however significant artifact). will call [phone] for significant delta trop and ecg if any changes. S Alex PGY2 received sign out on this pt pending admission for unwitnessed syncope found to be covid+ and febrile s/p empiric abx. accepted to tele pending rpt trop and ecg (initial without obvious ST changes, however significant artifact). will call [phone] for significant delta trop and ecg if any changes.

## 2023-04-06 NOTE — ED PROVIDER NOTE - OBJECTIVE STATEMENT
89 yo F with PMH of HTN and blood clot presenting from home after a fall. Pt was found down by her 2nd cousin who checks on her every day. She couldn't reach her today and went to the house. The pt stated she felt weak and slid to the ground and couldn't get up. The cousin couldn't get her up either so she called EMS. Pt not complaining of any pain. She denies abd pain, nausea, vomiting, diarrhea, chest pain. Pt febrile on arrival. 87 yo F with PMH of HTN and blood clot presenting from home after a fall. Pt was found down by her 2nd cousin who checks on her every day. She couldn't reach her today and went to the house. The pt stated she felt weak and slid to the ground and couldn't get up. The cousin couldn't get her up either so she called EMS. Pt not complaining of any pain. She denies abd pain, nausea, vomiting, diarrhea, chest pain. Pt febrile on arrival. 87 yo F with PMH of HTN and blood clot presenting from home after a fall. Pt was found down by her 2nd cousin who checks on her every day. She couldn't reach her today and went to the house. The pt stated she felt weak and slid to the ground and couldn't get up. The cousin couldn't get her up either so she called EMS. Pt not complaining of any pain. She denies abd pain, nausea, vomiting, diarrhea, chest pain. Pt febrile on arrival.    primary provider: Sheela Hyde NP 89 yo F with PMH of HTN and blood clot presenting from home after a fall. Pt was found down by her 2nd cousin who checks on her every day. She couldn't reach her today and went to the house. The pt stated she felt weak and slid to the ground and couldn't get up. The cousin couldn't get her up either so she called EMS. Pt not complaining of any pain. She denies abd pain, nausea, vomiting, diarrhea, chest pain. Pt febrile on arrival.    primary provider: Sheela Hyde NP

## 2023-04-06 NOTE — ED PROVIDER NOTE - CARE PLAN
Principal Discharge DX:	Syncope   1 Principal Discharge DX:	Syncope  Secondary Diagnosis:	Elevated troponin level  Secondary Diagnosis:	2019 novel coronavirus disease (COVID-19)

## 2023-04-06 NOTE — H&P ADULT - PROBLEM SELECTOR PLAN 5
Pt with small ulcer in R distal tibia, no S/S of infection. Xray b/l tib/fib negative for cortical erosion or periosteal reaction.  - Wound care consult placed  - Monitor off antibiotics for now

## 2023-04-06 NOTE — ED ADULT TRIAGE NOTE - NS ED NURSE AMBULANCES
[facility]

## 2023-04-06 NOTE — ED ADULT NURSE NOTE - NSIMPLEMENTINTERV_GEN_ALL_ED
Implemented All Fall Risk Interventions:  [place] to call system. Call bell, personal items and telephone within reach. Instruct patient to call for assistance. Room bathroom lighting operational. Non-slip footwear when patient is off stretcher. Physically safe environment: no spills, clutter or unnecessary equipment. Stretcher in lowest position, wheels locked, appropriate side rails in place. Provide visual cue, wrist band, yellow gown, etc. Monitor gait and stability. Monitor for mental status changes and reorient to person, place, and time. Review medications for side effects contributing to fall risk. Reinforce activity limits and safety measures with patient and family. Implemented All Fall Risk Interventions:  [place] to call system. Call bell, personal items and telephone within reach. Instruct patient to call for assistance. Room bathroom lighting operational. Non-slip footwear when patient is off stretcher. Physically safe environment: no spills, clutter or unnecessary equipment. Stretcher in lowest position, wheels locked, appropriate side rails in place. Provide visual cue, wrist band, yellow gown, etc. Monitor gait and stability. Monitor for mental status changes and reorient to person, place, and time. Review medications for side effects contributing to fall risk. Reinforce activity limits and safety measures with patient and family. Implemented All Fall Risk Interventions:  [place] to call system. Call bell, personal items and telephone within reach. Instruct patient to call for assistance. Room bathroom lighting operational. Non-slip footwear when patient is off stretcher. Physically safe environment: no spills, clutter or unnecessary equipment. Stretcher in lowest position, wheels locked, appropriate side rails in place. Provide visual cue, wrist band, yellow gown, etc. Monitor gait and stability. Monitor for mental status changes and reorient to person, place, and time. Review medications for side effects contributing to fall risk. Reinforce activity limits and safety measures with patient and family.

## 2023-04-06 NOTE — ED PROVIDER NOTE - CLINICAL SUMMARY MEDICAL DECISION MAKING FREE TEXT BOX
Problem: Breastfeeding (Adult,Obstetrics,Pediatric)  Goal: Signs and Symptoms of Listed Potential Problems Will be Absent, Minimized or Managed (Breastfeeding)  Outcome: Ongoing (interventions implemented as appropriate)  Flowsheets (Taken 5/2/2020 1335)  Problems Assessed (Breastfeeding): pain; situational response; skin breakdown; ineffective breastfeeding  Intervention: Provide Support During Feeding Sessions  Flowsheets (Taken 5/2/2020 1335)  Parent/Child Attachment Promotion: caring behavior modeled; cue recognition promoted; skin-to-skin contact encouraged; strengths emphasized; positive reinforcement provided  Intervention: Promote Positive Maternal Experience  Flowsheets (Taken 5/2/2020 1335)  Supportive Measures: active listening utilized  Breastfeeding Support: diary/feeding log utilized; encouragement provided; lactation counseling provided  Intervention: Support Exclusive Breastfeeding Success  Flowsheets (Taken 5/2/2020 1335)  Breastfeeding Assistance: support offered; feeding on demand promoted; feeding cue recognition promoted      87 yo F with PMH of HTN and blood clot presenting from home after a fall. Differential diagnosis includes but is not limited to uti, 89 yo F with PMH of HTN and blood clot presenting from home after a fall. Differential diagnosis includes but is not limited to uti,

## 2023-04-06 NOTE — H&P ADULT - PROBLEM SELECTOR PLAN 2
Pt found to be positive for COVID-19. CXR with clear lungs. Pt currently maintaining SpO2 100% RA.  - Will start 3-day course of Remdesivir given pt's advanced age, which places pt at higher risk for progression to severe COVID-19  - Pt does not meet criteria for Decadron at this time, as pt currently not requiring supplemental O2  - Trend inflammatory markers, including CRP, ferritin, procalcitonin, and D-dimer  - Start DVT ppx with Lovenox SQ 40 mg daily given D-dimer 255 (in DDU)  - Supportive care with PO Tylenol PRN for fever and supplemental O2 PRN  - Airborne/contact precautions Pt found to be positive for COVID-19. CXR with clear lungs. Pt currently maintaining SpO2 100% RA.  - Will start 3-day course of Remdesivir given pt's advanced age, which places pt at higher risk for progression to severe COVID-19  - Pt does not meet criteria for Decadron at this time, as pt currently not requiring supplemental O2  - Trend inflammatory markers, including CRP, ferritin, procalcitonin, and D-dimer  - Hold pharmacologic ppx with Lovenox SQ for now given pt now with acute anemia, r/o bleed as below  - Supportive care with PO Tylenol PRN for fever and supplemental O2 PRN  - Airborne/contact precautions

## 2023-04-06 NOTE — H&P ADULT - HISTORY OF PRESENT ILLNESS
**** HPI obtained mainly from pt's cousin, Michelle ([phone]) **** HPI obtained mainly from pt's cousin, Michelle ([phone]) **** HPI obtained mainly from pt's cousin, Michelle ([phone]) **** HPI obtained mainly from pt's cousin, Michelle ([phone]), as pt is a limited historian. ****    87 yo woman with history of HTN, LE DVT (dx 10/2022, was on Coumadin and ?Eliquis, no longer on AC), osteoarthritis, b/l cataracts, SBO (7/2021, conservatively managed), and R hemicolectomy (1998, unclear reason) presents from home after being found down on the floor by pt's cousin, Michelle, on Thursday morning. According to Michelle, pt lives alone and is independent with ADLs, including cooking and shopping, but Michelle lives nearby and visits pt every day. On Thursday morning, Michelle called pt on the phone, but pt didn't  the call, so Michelle went to pt's house and found pt down on the floor near the front door, awake and alert. Michelle helped pt off the floor, but pt was unsteady on her feet and unable to ambulate as she normally does with her cane, prompting Michelle to call EMS. Pt denied having any pain at the time but mentioned to Michelle that she has been feeling weak recently. Pt also reported that she was down on the floor since Wednesday night. Pt went to her kitchen in the middle of the night but began feeling weak, especially in her legs, and tried to sit down on one of the chairs in the kitchen. However, the chair moved, causing pt to slide off the chair and onto the floor. Pt denied any head strike or LOC. Pt wasn't able to get herself up from the floor, so she crawled to near the front door to try to call for help but was unsuccessful in getting help. When pt seen and examined at bedside in the ED, pt sleepy but arousable and able to answer some questions when pt awoken from sleep. Pt states that she has not been feeling well over the past few days and has felt worn out. Michelle notes that pt visited her ophthalmologist on Monday and received an injection in her left eye after she was discovered to have bleeding behind the eye after having blurry vision for the past 3 weeks. Pt also has chronic pain in both legs, though more considerably in the right leg, and has been following with a vascular surgeon since her LE DVT diagnosis. Pt currently denies any vision changes, eye pain, or headaches and reports no increased pain in her legs. Also denies any fevers, chills, cough, hemoptysis, chest pain, shortness of breath, palpitations, lightheadedness, dizziness, abdominal pain, nausea, vomiting, diarrhea, constipation, melena, rectal bleeding, dysuria, or weight changes.    In the ED,  T 99.1-100.7, -113, -142/50-61, RR 16-18, SpO2 100% RA.  COVID-19 positive. CXR with clear lungs.    **** HPI obtained mainly from pt's cousin, Michelle ([phone]), as pt is a limited historian. ****    89 yo woman with history of HTN, LE DVT (dx 10/2022, was on Coumadin and ?Eliquis, no longer on AC), osteoarthritis, b/l cataracts, SBO (7/2021, conservatively managed), and R hemicolectomy (1998, unclear reason) presents from home after being found down on the floor by pt's cousin, Michelle, on Thursday morning. According to Michelle, pt lives alone and is independent with ADLs, including cooking and shopping, but Michelle lives nearby and visits pt every day. On Thursday morning, Michelle called pt on the phone, but pt didn't  the call, so Michelle went to pt's house and found pt down on the floor near the front door, awake and alert. Michelle helped pt off the floor, but pt was unsteady on her feet and unable to ambulate as she normally does with her cane, prompting Michelle to call EMS. Pt denied having any pain at the time but mentioned to Michelle that she has been feeling weak recently. Pt also reported that she was down on the floor since Wednesday night. Pt went to her kitchen in the middle of the night but began feeling weak, especially in her legs, and tried to sit down on one of the chairs in the kitchen. However, the chair moved, causing pt to slide off the chair and onto the floor. Pt denied any head strike or LOC. Pt wasn't able to get herself up from the floor, so she crawled to near the front door to try to call for help but was unsuccessful in getting help. When pt seen and examined at bedside in the ED, pt sleepy but arousable and able to answer some questions when pt awoken from sleep. Pt states that she has not been feeling well over the past few days and has felt worn out. Michelle notes that pt visited her ophthalmologist on Monday and received an injection in her left eye after she was discovered to have bleeding behind the eye after having blurry vision for the past 3 weeks. Pt also has chronic pain in both legs, though more considerably in the right leg, and has been following with a vascular surgeon since her LE DVT diagnosis. Pt currently denies any vision changes, eye pain, or headaches and reports no increased pain in her legs. Also denies any fevers, chills, cough, hemoptysis, chest pain, shortness of breath, palpitations, lightheadedness, dizziness, abdominal pain, nausea, vomiting, diarrhea, constipation, melena, rectal bleeding, dysuria, or weight changes.    In the ED,  T 99.1-100.7, -113, -142/50-61, RR 16-18, SpO2 100% RA.  COVID-19 positive. CXR with clear lungs.    **** HPI obtained mainly from pt's cousin, Michelle ([phone]), as pt is a limited historian. ****    89 yo woman with history of HTN, LE DVT (dx 10/2022, was on Coumadin and ?Eliquis, no longer on AC), osteoarthritis, b/l cataracts, SBO (7/2021, conservatively managed), and R hemicolectomy (1998, unclear reason) presents from home after being found down on the floor by pt's cousin, Michelle, on Thursday morning. According to Michelle, pt lives alone and is independent with ADLs, including cooking and shopping, but Michelle lives nearby and visits pt every day. On Thursday morning, Michelle called pt on the phone, but pt didn't  the call, so Michelle went to pt's house and found pt down on the floor near the front door, awake and alert. Michelle helped pt off the floor, but pt was unsteady on her feet and unable to ambulate as she normally does with her cane, prompting Michelle to call EMS. Pt denied having any pain at the time but mentioned to Michelle that she has been feeling weak recently. Pt also reported that she was down on the floor since Wednesday night. Pt went to her kitchen in the middle of the night but began feeling weak, especially in her legs, and tried to sit down on one of the chairs in the kitchen. However, the chair moved, causing pt to slide off the chair and onto the floor. Pt denied any head strike or LOC. Pt wasn't able to get herself up from the floor, so she crawled to near the front door to try to call for help but was unsuccessful in getting help. When pt seen and examined at bedside in the ED, pt sleepy but arousable and able to answer some questions when pt awoken from sleep. Pt states that she has not been feeling well over the past few days and has felt worn out. Michelle notes that pt visited her ophthalmologist on Monday and received an injection in her left eye after she was discovered to have bleeding behind the eye after having blurry vision for the past 3 weeks. Pt also has chronic pain in both legs, though more considerably in the right leg, and has been following with a vascular surgeon since her LE DVT diagnosis. Pt currently denies any vision changes, eye pain, or headaches and reports no increased pain in her legs. Also denies any fevers, chills, cough, hemoptysis, chest pain, shortness of breath, palpitations, lightheadedness, dizziness, abdominal pain, nausea, vomiting, diarrhea, constipation, melena, rectal bleeding, dysuria, or weight changes.    In the ED,  T 99.1-100.7, -113, -142/50-61, RR 16-18, SpO2 100% RA.  COVID-19 positive. CXR with clear lungs.    **** HPI obtained mainly from pt's cousin, Michelle ([phone]), as pt is a limited historian. ****    87 yo woman with history of HTN, LE DVT (dx 10/2022, was on Coumadin and ?Eliquis, no longer on AC), osteoarthritis, b/l cataracts, SBO (7/2021, conservatively managed), and benign colonic mass s/p R hemicolectomy (1998) presents from home after being found down on the floor by pt's cousin, Michelle, on Thursday morning. According to Michelle, pt lives alone and is independent with ADLs, including cooking and shopping, but Michelle lives nearby and visits pt every day. On Thursday morning, Michelle called pt on the phone, but pt didn't  the call, so Michelle went to pt's house and found pt down on the floor near the front door, awake and alert. Michelle helped pt off the floor, but pt was unsteady on her feet and unable to ambulate as she normally does with her cane, prompting Michelle to call EMS. Pt denied having any pain at the time but mentioned to Michelle that she has been feeling weak recently. Pt also reported that she was down on the floor since Wednesday night. Pt went to her kitchen in the middle of the night but began feeling weak, especially in her legs, and tried to sit down on one of the chairs in the kitchen. However, the chair moved, causing pt to slide off the chair and onto the floor. Pt denied any head strike or LOC. Pt wasn't able to get herself up from the floor, so she crawled to near the front door to try to call for help but was unsuccessful in getting help. When pt seen and examined at bedside in the ED, pt sleepy but arousable and able to answer some questions when pt awoken from sleep. Pt states that she has not been feeling well over the past few days and has felt worn out. Michelle notes that pt visited her ophthalmologist on Monday and received an injection in her left eye after she was discovered to have bleeding behind the eye after having blurry vision for the past 3 weeks. Pt also has chronic pain in both legs, though more considerably in the right leg, and has been following with a vascular surgeon since her LE DVT diagnosis. Pt currently denies any vision changes, eye pain, or headaches and reports no increased pain in her legs. Also denies any fevers, chills, cough, hemoptysis, chest pain, shortness of breath, palpitations, lightheadedness, dizziness, abdominal pain, nausea, vomiting, diarrhea, constipation, melena, rectal bleeding, dysuria, or weight changes.    In the ED,  T 99.1-100.7, -113, -142/50-61, RR 16-18, SpO2 100% RA.  COVID-19 positive. CXR with clear lungs.    **** HPI obtained mainly from pt's cousin, Michelle ([phone]), as pt is a limited historian. ****    89 yo woman with history of HTN, LE DVT (dx 10/2022, was on Coumadin and ?Eliquis, no longer on AC), osteoarthritis, b/l cataracts, SBO (7/2021, conservatively managed), and benign colonic mass s/p R hemicolectomy (1998) presents from home after being found down on the floor by pt's cousin, Michelle, on Thursday morning. According to Michelle, pt lives alone and is independent with ADLs, including cooking and shopping, but Michelle lives nearby and visits pt every day. On Thursday morning, Michelle called pt on the phone, but pt didn't  the call, so Michelle went to pt's house and found pt down on the floor near the front door, awake and alert. Michelle helped pt off the floor, but pt was unsteady on her feet and unable to ambulate as she normally does with her cane, prompting Michelle to call EMS. Pt denied having any pain at the time but mentioned to Michelle that she has been feeling weak recently. Pt also reported that she was down on the floor since Wednesday night. Pt went to her kitchen in the middle of the night but began feeling weak, especially in her legs, and tried to sit down on one of the chairs in the kitchen. However, the chair moved, causing pt to slide off the chair and onto the floor. Pt denied any head strike or LOC. Pt wasn't able to get herself up from the floor, so she crawled to near the front door to try to call for help but was unsuccessful in getting help. When pt seen and examined at bedside in the ED, pt sleepy but arousable and able to answer some questions when pt awoken from sleep. Pt states that she has not been feeling well over the past few days and has felt worn out. Michelle notes that pt visited her ophthalmologist on Monday and received an injection in her left eye after she was discovered to have bleeding behind the eye after having blurry vision for the past 3 weeks. Pt also has chronic pain in both legs, though more considerably in the right leg, and has been following with a vascular surgeon since her LE DVT diagnosis. Pt currently denies any vision changes, eye pain, or headaches and reports no increased pain in her legs. Also denies any fevers, chills, cough, hemoptysis, chest pain, shortness of breath, palpitations, lightheadedness, dizziness, abdominal pain, nausea, vomiting, diarrhea, constipation, melena, rectal bleeding, dysuria, or weight changes.    In the ED,  T 99.1-100.7, -113, -142/50-61, RR 16-18, SpO2 100% RA.  COVID-19 positive. CXR with clear lungs.    **** HPI obtained mainly from pt's cousin, Michelle ([phone]), as pt is a limited historian. ****    89 yo woman with history of HTN, LE DVT (dx 10/2022, was on Coumadin and ?Eliquis, no longer on AC), osteoarthritis, b/l cataracts, SBO (7/2021, conservatively managed), and benign colonic mass s/p R hemicolectomy (1998) presents from home after being found down on the floor by pt's cousin, Michelle, on Thursday morning. According to Michelle, pt lives alone and is independent with ADLs, including cooking and shopping, but Michelle lives nearby and visits pt every day. On Thursday morning, Michelle called pt on the phone, but pt didn't  the call, so Michelle went to pt's house and found pt down on the floor near the front door, awake and alert. Michelle helped pt off the floor, but pt was unsteady on her feet and unable to ambulate as she normally does with her cane, prompting Michelle to call EMS. Pt denied having any pain at the time but mentioned to Michelle that she has been feeling weak recently. Pt also reported that she was down on the floor since Wednesday night. Pt went to her kitchen in the middle of the night but began feeling weak, especially in her legs, and tried to sit down on one of the chairs in the kitchen. However, the chair moved, causing pt to slide off the chair and onto the floor. Pt denied any head strike or LOC. Pt wasn't able to get herself up from the floor, so she crawled to near the front door to try to call for help but was unsuccessful in getting help. When pt seen and examined at bedside in the ED, pt sleepy but arousable and able to answer some questions when pt awoken from sleep. Pt states that she has not been feeling well over the past few days and has felt worn out. Michelle notes that pt visited her ophthalmologist on Monday and received an injection in her left eye after she was discovered to have bleeding behind the eye after having blurry vision for the past 3 weeks. Pt also has chronic pain in both legs, though more considerably in the right leg, and has been following with a vascular surgeon since her LE DVT diagnosis. Pt currently denies any vision changes, eye pain, or headaches and reports no increased pain in her legs. Also denies any fevers, chills, cough, hemoptysis, chest pain, shortness of breath, palpitations, lightheadedness, dizziness, abdominal pain, nausea, vomiting, diarrhea, constipation, melena, rectal bleeding, dysuria, or weight changes.    In the ED,  T 99.1-100.7, -113, -142/50-61, RR 16-18, SpO2 100% RA.  COVID-19 positive. CXR with clear lungs.    **** HPI obtained mainly from pt's cousin, Michelle ([phone]), as pt is a limited historian. ****    87 yo woman with history of HTN, LE DVT (dx 10/2022, was on Coumadin and ?Eliquis, no longer on AC), osteoarthritis, b/l cataracts, SBO (7/2021, conservatively managed), and benign colonic mass s/p R hemicolectomy (1998) presents from home after being found down on the floor by pt's cousin, Michelle, on Thursday morning. According to Michelle, pt lives alone and is independent with ADLs, including cooking and shopping, but Michelle lives nearby and visits pt every day. On Thursday morning, Michelle called pt on the phone, but pt didn't  the call, so Michelle went to pt's house and found pt down on the floor near the front door, awake and alert. Michelle helped pt off the floor, but pt was unsteady on her feet and unable to ambulate as she normally does with her cane, prompting Michelle to call EMS. Pt denied having any pain at the time but mentioned to Michelle that she has been feeling weak recently. Pt also reported that she was down on the floor since Wednesday night. Pt went to her kitchen in the middle of the night but began feeling weak, especially in her legs, and tried to sit down on one of the chairs in the kitchen. However, the chair moved, causing pt to slide off the chair and onto the floor. Pt denied any head strike or LOC. Pt wasn't able to get herself up from the floor, so she crawled to near the front door to try to call for help but was unsuccessful in getting help. When pt seen and examined at bedside in the ED, pt sleepy but arousable and able to answer some questions when pt awoken from sleep. Pt states that she has not been feeling well over the past few days and has felt worn out. Pt denies any antiplatelet or anticoagulation use. Michelle notes that pt visited her ophthalmologist on Monday and received an injection in her left eye after she was discovered to have bleeding behind the eye after having blurry vision for the past 3 weeks. Pt also has chronic pain in both legs, though more considerably in the right leg, and has been following with a vascular surgeon since her LE DVT diagnosis. Pt currently denies any vision changes, eye pain, or headaches and reports no increased pain in her legs. Also denies any fevers, chills, cough, hemoptysis, chest pain, shortness of breath, palpitations, lightheadedness, dizziness, abdominal pain, nausea, vomiting, diarrhea, constipation, melena, rectal bleeding, dysuria, or weight changes.    In the ED,  T 99.1-100.7, -113, -142/50-61, RR 16-18, SpO2 100% RA.  COVID-19 positive. CXR with clear lungs.    **** HPI obtained mainly from pt's cousin, Michelle ([phone]), as pt is a limited historian. ****    89 yo woman with history of HTN, LE DVT (dx 10/2022, was on Coumadin and ?Eliquis, no longer on AC), osteoarthritis, b/l cataracts, SBO (7/2021, conservatively managed), and benign colonic mass s/p R hemicolectomy (1998) presents from home after being found down on the floor by pt's cousin, Michelle, on Thursday morning. According to Michelle, pt lives alone and is independent with ADLs, including cooking and shopping, but Michelle lives nearby and visits pt every day. On Thursday morning, Michelle called pt on the phone, but pt didn't  the call, so Michelle went to pt's house and found pt down on the floor near the front door, awake and alert. Michelle helped pt off the floor, but pt was unsteady on her feet and unable to ambulate as she normally does with her cane, prompting Michelle to call EMS. Pt denied having any pain at the time but mentioned to Michelle that she has been feeling weak recently. Pt also reported that she was down on the floor since Wednesday night. Pt went to her kitchen in the middle of the night but began feeling weak, especially in her legs, and tried to sit down on one of the chairs in the kitchen. However, the chair moved, causing pt to slide off the chair and onto the floor. Pt denied any head strike or LOC. Pt wasn't able to get herself up from the floor, so she crawled to near the front door to try to call for help but was unsuccessful in getting help. When pt seen and examined at bedside in the ED, pt sleepy but arousable and able to answer some questions when pt awoken from sleep. Pt states that she has not been feeling well over the past few days and has felt worn out. Pt denies any antiplatelet or anticoagulation use. Michelle notes that pt visited her ophthalmologist on Monday and received an injection in her left eye after she was discovered to have bleeding behind the eye after having blurry vision for the past 3 weeks. Pt also has chronic pain in both legs, though more considerably in the right leg, and has been following with a vascular surgeon since her LE DVT diagnosis. Pt currently denies any vision changes, eye pain, or headaches and reports no increased pain in her legs. Also denies any fevers, chills, cough, hemoptysis, chest pain, shortness of breath, palpitations, lightheadedness, dizziness, abdominal pain, nausea, vomiting, diarrhea, constipation, melena, rectal bleeding, dysuria, or weight changes.    In the ED,  T 99.1-100.7, -113, -142/50-61, RR 16-18, SpO2 100% RA.  COVID-19 positive. CXR with clear lungs.    **** HPI obtained mainly from pt's cousin, Michelle ([phone]), as pt is a limited historian. ****    89 yo woman with history of HTN, LE DVT (dx 10/2022, was on Coumadin and ?Eliquis, no longer on AC), osteoarthritis, b/l cataracts, SBO (7/2021, conservatively managed), and benign colonic mass s/p R hemicolectomy (1998) presents from home after being found down on the floor by pt's cousin, Michelle, on Thursday morning. According to Michelle, pt lives alone and is independent with ADLs, including cooking and shopping, but Michelle lives nearby and visits pt every day. On Thursday morning, Michelle called pt on the phone, but pt didn't  the call, so Michelle went to pt's house and found pt down on the floor near the front door, awake and alert. Michelle helped pt off the floor, but pt was unsteady on her feet and unable to ambulate as she normally does with her cane, prompting Michelle to call EMS. Pt denied having any pain at the time but mentioned to Michelle that she has been feeling weak recently. Pt also reported that she was down on the floor since Wednesday night. Pt went to her kitchen in the middle of the night but began feeling weak, especially in her legs, and tried to sit down on one of the chairs in the kitchen. However, the chair moved, causing pt to slide off the chair and onto the floor. Pt denied any head strike or LOC. Pt wasn't able to get herself up from the floor, so she crawled to near the front door to try to call for help but was unsuccessful in getting help. When pt seen and examined at bedside in the ED, pt sleepy but arousable and able to answer some questions when pt awoken from sleep. Pt states that she has not been feeling well over the past few days and has felt worn out. Pt denies any antiplatelet or anticoagulation use. Michelle notes that pt visited her ophthalmologist on Monday and received an injection in her left eye after she was discovered to have bleeding behind the eye after having blurry vision for the past 3 weeks. Pt also has chronic pain in both legs, though more considerably in the right leg, and has been following with a vascular surgeon since her LE DVT diagnosis. Pt currently denies any vision changes, eye pain, or headaches and reports no increased pain in her legs. Also denies any fevers, chills, cough, hemoptysis, chest pain, shortness of breath, palpitations, lightheadedness, dizziness, abdominal pain, nausea, vomiting, diarrhea, constipation, melena, rectal bleeding, dysuria, or weight changes.    In the ED,  T 99.1-100.7, -113, -142/50-61, RR 16-18, SpO2 100% RA.  COVID-19 positive. CXR with clear lungs.

## 2023-04-06 NOTE — ED PROVIDER NOTE - PHYSICAL EXAMINATION
General: well -appearing, no acute distress  Head: Atraumatic, normocephalic  Eyes: EOM grossly in tact, no scleral icterus, no discharge  Neurology: A&Ox 3, nonfocal, GIFFORD x 4  ENT: mucous membranes moist  Respiratory: CTAB, no wheezing, normal respiratory effort  CV: + murmur, Extremities warm and well perfused  Abdominal: Soft, non-distended, non-tender, no masses  Extremities: No edema, no deformities  Skin: warm and dry. No rashes

## 2023-04-06 NOTE — ED PROVIDER NOTE - ATTENDING CONTRIBUTION TO CARE
88F HTN lives alone, h/o VTE, NKA.  Came in from home due to family member who calls her daily, pt was not picking up phone, pt was on the ground.  Pt denies complaints.  Pt drooling unclear reason, pt says is new - later improved.  Pt oriented here.  Denies pain anywhere.  Pt reports she felt weak and slid.  On exam NAD, no sign of trauma. Febrile, mild tachycardia - check labs, cultures, rx empiric abx.  Found to have covid.  Found to have elevated trops, EKG noisy baseline but no STEMI.  Cards eval, admit.  VS:  fever tachycardia    GEN - malaise, no resp distress;   A+O x3   HEAD - NC/AT     ENT - PEERL, EOMI, mucous membranes    moist , no discharge      NECK: Neck supple, non-tender without lymphadenopathy, no masses, no JVD  PULM - CTA b/l,  symmetric breath sounds  COR -  normal heart sounds    ABD - , ND, NT, soft,  BACK - no CVA tenderness, nontender spine     EXTREMS - no edema, no deformity, warm and well perfused    SKIN - no rash    or bruising      NEUROLOGIC - alert, face symmetric, speech fluent, sensation nl, motor no focal deficit.

## 2023-04-06 NOTE — ED PROVIDER NOTE - NSICDXPASTMEDICALHX_GEN_ALL_CORE_FT
PAST MEDICAL HISTORY:  HTN (hypertension)      PAST MEDICAL HISTORY:  DVT, lower extremity     History of small bowel obstruction     HTN (hypertension)     OA (osteoarthritis)

## 2023-04-06 NOTE — H&P ADULT - PROBLEM SELECTOR PLAN 4
Hs-trops 68 -> 65 on admission. EKG on admission with lots of artifact but no obvious OSMANY or clinically significant STD. Pt without any chest pain or other anginal symptoms. Likely 2/2 demand from fall and sepsis/COVID-19.  - Repeat hs-trop in AM  - Repeat EKG in AM given lots of artifact with EKG on admission  - Monitor on tele  - Treat sepsis as above

## 2023-04-06 NOTE — ED PROVIDER NOTE - NSICDXPASTSURGICALHX_GEN_ALL_CORE_FT
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

## 2023-04-06 NOTE — H&P ADULT - ASSESSMENT
89 yo woman with history of HTN, LE DVT (dx 10/2022, was on Coumadin and ?Eliquis, no longer on AC), osteoarthritis, b/l cataracts, SBO (7/2021, conservatively managed), and benign colonic mass s/p R hemicolectomy (1998) presents from home after being found down on the floor by pt's cousin, Michelle, on Thursday morning, admitted with sepsis likely 2/2 COVID-19.  87 yo woman with history of HTN, LE DVT (dx 10/2022, was on Coumadin and ?Eliquis, no longer on AC), osteoarthritis, b/l cataracts, SBO (7/2021, conservatively managed), and benign colonic mass s/p R hemicolectomy (1998) presents from home after being found down on the floor by pt's cousin, Michelle, on Thursday morning, admitted with sepsis likely 2/2 COVID-19.

## 2023-04-06 NOTE — ED ADULT NURSE NOTE - CAS EDP DISCH DISPOSITION ADMI
[facility] L502A/[facility] L502A/[facility] L502A/[facility]

## 2023-04-06 NOTE — H&P ADULT - PROBLEM SELECTOR PLAN 3
Pt found by her cousin down on the floor Pt found by her cousin down on the floor, awake and alert. Pt insists that she slid off a chair due to the chair moving as she was trying to sit down due to weakness, especially in her legs. No head strike or LOC/syncope. CTH noncontrast negative for acute findings.  - Check orthostatics  - TTE ordered, as pt with grade 3/6 murmur on exam and pro-BNP 1271  -  on admission, but moderately hemolyzed. S/p 1L bolus of LR. Repeat CK in AM. C/w IVF if CK uptrends, otherwise encourage oral hydration.   - Pt reporting leg weakness prior to fall, also has chronic leg pain. Given pt with history of LE DVT, will check b/l LE venous dopplers.  - CT C/A/P with IV contrast ordered, as pt now with acute anemia as below  - PT/OT consults  - Fall risk protocol

## 2023-04-06 NOTE — H&P ADULT - NSICDXPASTMEDICALHX_GEN_ALL_CORE_FT
PAST MEDICAL HISTORY:  DVT, lower extremity     History of small bowel obstruction     HTN (hypertension)     OA (osteoarthritis)

## 2023-04-06 NOTE — H&P ADULT - NSHPSOCIALHISTORY_GEN_ALL_CORE
Denies any history of alcohol, tobacco, or illicit drug use (confirmed by pt's cousin).  Lives alone. Independent with ADLs.  Ambulates with a cane at baseline.  Retired.

## 2023-04-06 NOTE — H&P ADULT - PROBLEM SELECTOR PLAN 1
Pt met SIRS criteria with T 100.7 and HR >90. Suspect likely with sepsis 2/2 COVID-19. CXR with clear lungs. UA negative. Pt with R leg ulcer without any S/S of infection.   - Plan as below for COVID-19  - F/u blood cxs and urine cx  - Lactate 2.9 on admission. S/p 1L bolus of LR. Repeat lactate in AM.  - D-dimer 255 (in DDU) on admission. Age-adjusted D-dimer cutoff is 440 (in DDU). Pt with no complaints of chest pain or shortness of breath and currently maintaining SpO2 100% RA. Low suspicion for PE at this time. Pt met SIRS criteria with T 100.7 and HR >90. Suspect likely with sepsis 2/2 COVID-19. CXR with clear lungs. UA negative. Pt with R leg ulcer without any S/S of infection.   - Plan as below for COVID-19  - F/u blood cxs and urine cx  - Lactate 2.9 on admission. S/p 1L bolus of LR. Repeat lactate in AM.  - Procalcitonin mildly elevated at 0.20, possibly from COVID-19. Monitor off abx for now pending blood cx results.   - D-dimer 255 (in DDU) on admission. Age-adjusted D-dimer cutoff is 440 (in DDU). Pt with no complaints of chest pain or shortness of breath and currently maintaining SpO2 100% RA. Low suspicion for PE at this time.

## 2023-04-06 NOTE — ED PROVIDER NOTE - CROS ED GI ALL NEG
Subjective: Patient states that she is here for follow-up of her left heel pain. I talked with her therapist today and they have tried iontophoresis without relief as well as dry needling with actual increased pain. Patient is very hypersensitive. She feels like her pain is about the same and is around a 9 out of 10. She's been on crutches and is here with her mother  Objective: Physical exam shows she has no significant swelling erythema or ecchymosis. Hypersensitivity to even light touch over the plantar medial aspect of the left heel. Anterior drawer and talar tilt showed no gross laxity strength is 4/5 today and she has tightness of her gastroc  Imaging:  Assessment and plan: At this point per previous discussion I'm going to go ahead and put her into a short leg plantar fascial cast for the next 2 weeks. I put her on prednisone by mouth which is the 1st time she's taken this and we discussed side effects. If she is improved in 2 weeks I'll try to get her into therapy and progress as tolerated. Her pain is improved but not enough I might cast her again. She'll call me if they have any problems whatsoever.   She is not a great surgical candidate due to her hypersensitivity - - -

## 2023-04-06 NOTE — H&P ADULT - PROBLEM SELECTOR PLAN 7
- DVT ppx: Hold pharmacologic ppx with Lovenox SQ for now given pt now with acute anemia, r/o bleed  - Diet: Pureed - Unable to get list of pt's home medications from pt's cousin. Pt's cousin to stop by pt's house Friday morning to get list of pt's home medications prior to coming to [facility].  - Complete Med Rec in AM - Unable to get list of pt's home medications from pt's cousin. Pt's cousin to stop by pt's house Friday morning to get list of pt's home medications prior to coming to [facility].  - Complete Med Rec in AM - Unable to get list of pt's home medications from pt's cousin. Pt's cousin to stop by pt's house Friday morning to get list of pt's home medications prior to coming to [facility].  - Complete Med Rec in AM

## 2023-04-06 NOTE — ED ADULT TRIAGE NOTE - CHIEF COMPLAINT QUOTE
Pt from home s/p fall sliding down chair at 0600 am. Denies hitting head, LOC, and use of blood thinners. Pt reports she was standing and slid to floor. Noted to have fever. Phx: HTN, arthritis

## 2023-04-06 NOTE — H&P ADULT - NSHPLABSRESULTS_GEN_ALL_CORE
EKG personally reviewed.  Sinus tach at 113 bpm, lots of artifact, ?STD in V4-V6 (<1 mm), QTc 411 ms.    Labs personally reviewed.                        11.7   5.04  )-----------( 323      ( 06 Apr 2023 16:30 )             36.6     04-06    140  |  102  |  19  ----------------------------<  93  4.7   |  24  |  0.76    Ca    9.9      06 Apr 2023 16:30  Mg     2.30     04-06    TPro  7.5  /  Alb  4.1  /  TBili  0.4  /  DBili  x   /  AST  63<H>  /  ALT  20  /  AlkPhos  140<H>  04-06    Hs-trop 68 -> 65  Pro-BNP 1271    Imaging personally reviewed.  ACC: [id number] EXAM:  XR TIB FIB AP LAT 2 VIEWS BI   ORDERED BY: JENNIFER PINZON   PROCEDURE DATE:  04/06/2023    IMPRESSION:  Subcutaneous edema of the bilateral lower extremities. Suggestion of ulcer at the level of the right distal tibial diaphysis. No cortical erosion or periosteal reaction.  There are vascular calcifications.    ACC: [id number] EXAM:  XR PELVIS AP ONLY 1-2 VIEWS   ORDERED BY: MYLA KNAPP   PROCEDURE DATE:  04/06/2023    FINDINGS:  Degenerative changes of the lumbar spine.  No acute fracture.  Obturator and pelvic rings are intact.  Degenerative changes of the joint spaces.  Evaluation of the sacrum is limited due to overlying bowel.    IMPRESSION:  No acute fracture or dislocation.    ACC: [id number] EXAM:  XR CHEST PORTABLE URGENT 1V   ORDERED BY: MYLA KNAPP   PROCEDURE DATE:  04/06/2023    FINDINGS:  Heart/Vascular: Heart size is within normal limits.  Pulmonary: Clear lungs. No pleural effusion or pneumothorax.  Bones: Degenerative osseous changes.    Impression:  Clear lungs.    ACC: [id number] EXAM:  CT BRAIN   ORDERED BY: MYLA KNAPP   PROCEDURE DATE:  04/06/2023      FINDINGS:  Limited by streak artifact.  HEMORRHAGE:  No evidence of intracranial hemorrhage.  BRAIN PARENCHYMA:  Mild to moderate atrophy. Mild to moderate periventricular and subcortical white matter ischemic changes with a parietal predominance.  No mass or mass effect.  VENTRICLES / SHIFT:  No hydrocephalus. No midline shift.  EXTRA-AXIAL / BASAL CISTERNS:  No extra-axial mass. Basal cisterns preserved.  CALVARIUM AND EXTRACRANIAL SOFT TISSUES:  No depressed calvarial fracture.  SINUSES, ORBITS, MASTOIDS:  Trace fluid within the mastoids. Visualized paranasal sinuses appear well-aerated. Frontal sinuses are hypoplastic.    IMPRESSION:  Limited by artifact. No intracranial hemorrhage, midline shift, or hydrocephalus. Atrophy with white matter ischemic changes. Interval follow-up if symptoms persist. EKG personally reviewed.  Sinus tach at 113 bpm, lots of artifact, ?STD in V4-V6 (<1 mm), QTc 411 ms.    Labs personally reviewed.                        11.7   5.04  )-----------( 323      ( 06 Apr 2023 16:30 )             36.6     04-06    140  |  102  |  19  ----------------------------<  93  4.7   |  24  |  0.76    Ca    9.9      06 Apr 2023 16:30  Mg     2.30     04-06    TPro  7.5  /  Alb  4.1  /  TBili  0.4  /  DBili  x   /  AST  63<H>  /  ALT  20  /  AlkPhos  140<H>  04-06    Hs-trop 68 -> 65  Pro-BNP 1271    Imaging personally reviewed.  ACC: [id number] EXAM:  XR TIB FIB AP LAT 2 VIEWS BI   ORDERED BY: JENNIFER PINZON   PROCEDURE DATE:  04/06/2023    IMPRESSION:  Subcutaneous edema of the bilateral lower extremities. Suggestion of ulcer at the level of the right distal tibial diaphysis. No cortical erosion or periosteal reaction.  There are vascular calcifications.    ACC: [id number] EXAM:  XR PELVIS AP ONLY 1-2 VIEWS   ORDERED BY: MYLA KNAPP   PROCEDURE DATE:  04/06/2023    FINDINGS:  Degenerative changes of the lumbar spine.  No acute fracture.  Obturator and pelvic rings are intact.  Degenerative changes of the joint spaces.  Evaluation of the sacrum is limited due to overlying bowel.    IMPRESSION:  No acute fracture or dislocation.    ACC: [id number] EXAM:  XR CHEST PORTABLE URGENT 1V   ORDERED BY: MYLA KNAPP   PROCEDURE DATE:  04/06/2023    FINDINGS:  Heart/Vascular: Heart size is within normal limits.  Pulmonary: Clear lungs. No pleural effusion or pneumothorax.  Bones: Degenerative osseous changes.    Impression:  Clear lungs.    ACC: [id number] EXAM:  CT BRAIN   ORDERED BY: MYLA KNAPP   PROCEDURE DATE:  04/06/2023      FINDINGS:  Limited by streak artifact.  HEMORRHAGE:  No evidence of intracranial hemorrhage.  BRAIN PARENCHYMA:  Mild to moderate atrophy. Mild to moderate periventricular and subcortical white matter ischemic changes with a parietal predominance.  No mass or mass effect.  VENTRICLES / SHIFT:  No hydrocephalus. No midline shift.  EXTRA-AXIAL / BASAL CISTERNS:  No extra-axial mass. Basal cisterns preserved.  CALVARIUM AND EXTRACRANIAL SOFT TISSUES:  No depressed calvarial fracture.  SINUSES, ORBITS, MASTOIDS:  Trace fluid within the mastoids. Visualized paranasal sinuses appear well-aerated. Frontal sinuses are hypoplastic.    IMPRESSION:  Limited by artifact. No intracranial hemorrhage, midline shift, or hydrocephalus. Atrophy with white matter ischemic changes. Interval follow-up if symptoms persist. EKG personally reviewed.  Sinus tach at 113 bpm, lots of artifact, ?STD in V4-V6 (<1 mm), QTc 411 ms.    Labs personally reviewed.                        11.7   5.04  )-----------( 323      ( 06 Apr 2023 16:30 )             36.6     04-06    140  |  102  |  19  ----------------------------<  93  4.7   |  24  |  0.76    Ca    9.9      06 Apr 2023 16:30  Mg     2.30     04-06    TPro  7.5  /  Alb  4.1  /  TBili  0.4  /  DBili  x   /  AST  63<H>  /  ALT  20  /  AlkPhos  140<H>  04-06    Hs-trop 68 -> 65  Pro-BNP 1271    Imaging personally reviewed.  ACC: [id number] EXAM:  XR TIB FIB AP LAT 2 VIEWS BI   ORDERED BY: JENNIFER PINZON   PROCEDURE DATE:  04/06/2023    IMPRESSION:  Subcutaneous edema of the bilateral lower extremities. Suggestion of ulcer at the level of the right distal tibial diaphysis. No cortical erosion or periosteal reaction.  There are vascular calcifications.    ACC: [id number] EXAM:  XR PELVIS AP ONLY 1-2 VIEWS   ORDERED BY: MYLA KNAPP   PROCEDURE DATE:  04/06/2023    FINDINGS:  Degenerative changes of the lumbar spine.  No acute fracture.  Obturator and pelvic rings are intact.  Degenerative changes of the joint spaces.  Evaluation of the sacrum is limited due to overlying bowel.    IMPRESSION:  No acute fracture or dislocation.    ACC: [id number] EXAM:  XR CHEST PORTABLE URGENT 1V   ORDERED BY: MYLA KNAPP   PROCEDURE DATE:  04/06/2023    FINDINGS:  Heart/Vascular: Heart size is within normal limits.  Pulmonary: Clear lungs. No pleural effusion or pneumothorax.  Bones: Degenerative osseous changes.    Impression:  Clear lungs.    ACC: [id number] EXAM:  CT BRAIN   ORDERED BY: MYLA KNAPP   PROCEDURE DATE:  04/06/2023      FINDINGS:  Limited by streak artifact.  HEMORRHAGE:  No evidence of intracranial hemorrhage.  BRAIN PARENCHYMA:  Mild to moderate atrophy. Mild to moderate periventricular and subcortical white matter ischemic changes with a parietal predominance.  No mass or mass effect.  VENTRICLES / SHIFT:  No hydrocephalus. No midline shift.  EXTRA-AXIAL / BASAL CISTERNS:  No extra-axial mass. Basal cisterns preserved.  CALVARIUM AND EXTRACRANIAL SOFT TISSUES:  No depressed calvarial fracture.  SINUSES, ORBITS, MASTOIDS:  Trace fluid within the mastoids. Visualized paranasal sinuses appear well-aerated. Frontal sinuses are hypoplastic.    IMPRESSION:  Limited by artifact. No intracranial hemorrhage, midline shift, or hydrocephalus. Atrophy with white matter ischemic changes. Interval follow-up if symptoms persist.

## 2023-04-06 NOTE — H&P ADULT - PROBLEM SELECTOR PLAN 8
- DVT ppx: Hold pharmacologic ppx with Lovenox SQ for now given pt now with acute anemia, r/o bleed  - Diet: Pureed, change to NPO pending CT C/A/P with IV contrast

## 2023-04-07 DIAGNOSIS — L97.919 NON-PRESSURE CHRONIC ULCER OF UNSPECIFIED PART OF RIGHT LOWER LEG WITH UNSPECIFIED SEVERITY: ICD-10-CM

## 2023-04-07 DIAGNOSIS — Z83.518 FAMILY HISTORY OF OTHER SPECIFIED EYE DISORDER: Chronic | ICD-10-CM

## 2023-04-07 DIAGNOSIS — U07.1 COVID-19: ICD-10-CM

## 2023-04-07 DIAGNOSIS — Z90.49 ACQUIRED ABSENCE OF OTHER SPECIFIED PARTS OF DIGESTIVE TRACT: Chronic | ICD-10-CM
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
## 2023-04-07 DIAGNOSIS — Z29.9 ENCOUNTER FOR PROPHYLACTIC MEASURES, UNSPECIFIED: ICD-10-CM

## 2023-04-07 DIAGNOSIS — D64.9 ANEMIA, UNSPECIFIED: ICD-10-CM

## 2023-04-07 DIAGNOSIS — Z79.899 OTHER LONG TERM (CURRENT) DRUG THERAPY: ICD-10-CM

## 2023-04-07 DIAGNOSIS — R77.8 OTHER SPECIFIED ABNORMALITIES OF PLASMA PROTEINS: ICD-10-CM

## 2023-04-07 DIAGNOSIS — H26.9 UNSPECIFIED CATARACT: Chronic | ICD-10-CM

## 2023-04-07 DIAGNOSIS — R13.10 DYSPHAGIA, UNSPECIFIED: ICD-10-CM

## 2023-04-07 DIAGNOSIS — E87.6 HYPOKALEMIA: ICD-10-CM

## 2023-04-07 DIAGNOSIS — A41.9 SEPSIS, UNSPECIFIED ORGANISM: ICD-10-CM

## 2023-04-07 DIAGNOSIS — I95.9 HYPOTENSION, UNSPECIFIED: ICD-10-CM

## 2023-04-07 LAB
A1C WITH ESTIMATED AVERAGE GLUCOSE RESULT: 6 % — HIGH (ref 4–5.6)
ALBUMIN SERPL ELPH-MCNC: 3.1 G/DL — LOW (ref 3.3–5)
ALP SERPL-CCNC: 93 U/L — SIGNIFICANT CHANGE UP (ref 40–120)
ALP SERPL-CCNC: 97 U/L — SIGNIFICANT CHANGE UP (ref 40–120)
ALT FLD-CCNC: 16 U/L — SIGNIFICANT CHANGE UP (ref 4–33)
ALT FLD-CCNC: 18 U/L — SIGNIFICANT CHANGE UP (ref 4–33)
ANION GAP SERPL CALC-SCNC: 11 MMOL/L — SIGNIFICANT CHANGE UP (ref 7–14)
ANION GAP SERPL CALC-SCNC: 12 MMOL/L — SIGNIFICANT CHANGE UP (ref 7–14)
AST SERPL-CCNC: 49 U/L — HIGH (ref 4–32)
AST SERPL-CCNC: 55 U/L — HIGH (ref 4–32)
BILIRUB SERPL-MCNC: 0.2 MG/DL — SIGNIFICANT CHANGE UP (ref 0.2–1.2)
BILIRUB SERPL-MCNC: 0.3 MG/DL — SIGNIFICANT CHANGE UP (ref 0.2–1.2)
BUN SERPL-MCNC: 16 MG/DL — SIGNIFICANT CHANGE UP (ref 7–23)
CALCIUM SERPL-MCNC: 8.4 MG/DL — SIGNIFICANT CHANGE UP (ref 8.4–10.5)
CALCIUM SERPL-MCNC: 8.6 MG/DL — SIGNIFICANT CHANGE UP (ref 8.4–10.5)
CHLORIDE SERPL-SCNC: 103 MMOL/L — SIGNIFICANT CHANGE UP (ref 98–107)
CHLORIDE SERPL-SCNC: 105 MMOL/L — SIGNIFICANT CHANGE UP (ref 98–107)
CK MB BLD-MCNC: 0.8 % — SIGNIFICANT CHANGE UP (ref 0–2.5)
CK MB CFR SERPL CALC: 6 NG/ML — HIGH
CK SERPL-CCNC: 743 U/L — HIGH (ref 25–170)
CO2 SERPL-SCNC: 24 MMOL/L — SIGNIFICANT CHANGE UP (ref 22–31)
CO2 SERPL-SCNC: 25 MMOL/L — SIGNIFICANT CHANGE UP (ref 22–31)
CREAT SERPL-MCNC: 0.82 MG/DL — SIGNIFICANT CHANGE UP (ref 0.5–1.3)
CREAT SERPL-MCNC: 0.84 MG/DL — SIGNIFICANT CHANGE UP (ref 0.5–1.3)
CRP SERPL-MCNC: 19.6 MG/L — HIGH
CULTURE RESULTS: NO GROWTH — SIGNIFICANT CHANGE UP
D DIMER BLD IA.RAPID-MCNC: 250 NG/ML DDU — HIGH
EGFR: 67 ML/MIN/1.73M2 — SIGNIFICANT CHANGE UP
EGFR: 69 ML/MIN/1.73M2 — SIGNIFICANT CHANGE UP
ESTIMATED AVERAGE GLUCOSE: 126 — SIGNIFICANT CHANGE UP
FERRITIN SERPL-MCNC: 53 NG/ML — SIGNIFICANT CHANGE UP (ref 15–150)
FERRITIN SERPL-MCNC: 61 NG/ML — SIGNIFICANT CHANGE UP (ref 15–150)
GLUCOSE BLDC GLUCOMTR-MCNC: 111 MG/DL — HIGH (ref 70–99)
GLUCOSE BLDC GLUCOMTR-MCNC: 122 MG/DL — HIGH (ref 70–99)
GLUCOSE BLDC GLUCOMTR-MCNC: 149 MG/DL — HIGH (ref 70–99)
GLUCOSE SERPL-MCNC: 75 MG/DL — SIGNIFICANT CHANGE UP (ref 70–99)
GLUCOSE SERPL-MCNC: 78 MG/DL — SIGNIFICANT CHANGE UP (ref 70–99)
HCT VFR BLD CALC: 27.8 % — LOW (ref 34.5–45)
HCT VFR BLD CALC: 28.5 % — LOW (ref 34.5–45)
HCT VFR BLD CALC: 30.3 % — LOW (ref 34.5–45)
HGB BLD-MCNC: 8.9 G/DL — LOW (ref 11.5–15.5)
HGB BLD-MCNC: 9.1 G/DL — LOW (ref 11.5–15.5)
HGB BLD-MCNC: 9.7 G/DL — LOW (ref 11.5–15.5)
IRON SATN MFR SERPL: 18 UG/DL — LOW (ref 30–160)
IRON SATN MFR SERPL: 6 % — LOW (ref 14–50)
LACTATE SERPL-SCNC: 1 MMOL/L — SIGNIFICANT CHANGE UP (ref 0.5–2)
LACTATE SERPL-SCNC: 1.1 MMOL/L — SIGNIFICANT CHANGE UP (ref 0.5–2)
MAGNESIUM SERPL-MCNC: 2.1 MG/DL — SIGNIFICANT CHANGE UP (ref 1.6–2.6)
MAGNESIUM SERPL-MCNC: 2.2 MG/DL — SIGNIFICANT CHANGE UP (ref 1.6–2.6)
MCHC RBC-ENTMCNC: 26.5 PG — LOW (ref 27–34)
MCHC RBC-ENTMCNC: 26.7 PG — LOW (ref 27–34)
MCHC RBC-ENTMCNC: 31.9 GM/DL — LOW (ref 32–36)
MCHC RBC-ENTMCNC: 32 GM/DL — SIGNIFICANT CHANGE UP (ref 32–36)
MCV RBC AUTO: 82.8 FL — SIGNIFICANT CHANGE UP (ref 80–100)
MCV RBC AUTO: 83.5 FL — SIGNIFICANT CHANGE UP (ref 80–100)
NRBC # BLD: 0 /100 WBCS — SIGNIFICANT CHANGE UP (ref 0–0)
NRBC # FLD: 0 K/UL — SIGNIFICANT CHANGE UP (ref 0–0)
NT-PROBNP SERPL-SCNC: 1271 PG/ML — HIGH
PHOSPHATE SERPL-MCNC: 3.6 MG/DL — SIGNIFICANT CHANGE UP (ref 2.5–4.5)
PHOSPHATE SERPL-MCNC: 3.8 MG/DL — SIGNIFICANT CHANGE UP (ref 2.5–4.5)
PLATELET # BLD AUTO: 230 K/UL — SIGNIFICANT CHANGE UP (ref 150–400)
PLATELET # BLD AUTO: 244 K/UL — SIGNIFICANT CHANGE UP (ref 150–400)
PLATELET # BLD AUTO: 268 K/UL — SIGNIFICANT CHANGE UP (ref 150–400)
POTASSIUM SERPL-MCNC: 3.3 MMOL/L — LOW (ref 3.5–5.3)
POTASSIUM SERPL-MCNC: 3.4 MMOL/L — LOW (ref 3.5–5.3)
POTASSIUM SERPL-SCNC: 3.3 MMOL/L — LOW (ref 3.5–5.3)
POTASSIUM SERPL-SCNC: 3.4 MMOL/L — LOW (ref 3.5–5.3)
PROCALCITONIN SERPL-MCNC: 0.2 NG/ML — HIGH (ref 0.02–0.1)
PROT SERPL-MCNC: 5.3 G/DL — LOW (ref 6–8.3)
PROT SERPL-MCNC: 6 G/DL — SIGNIFICANT CHANGE UP (ref 6–8.3)
RBC # BLD: 3.33 M/UL — LOW (ref 3.8–5.2)
RBC # BLD: 3.44 M/UL — LOW (ref 3.8–5.2)
RBC # BLD: 3.63 M/UL — LOW (ref 3.8–5.2)
RBC # FLD: 15.8 % — HIGH (ref 10.3–14.5)
RBC # FLD: 15.9 % — HIGH (ref 10.3–14.5)
SODIUM SERPL-SCNC: 139 MMOL/L — SIGNIFICANT CHANGE UP (ref 135–145)
SODIUM SERPL-SCNC: 141 MMOL/L — SIGNIFICANT CHANGE UP (ref 135–145)
SPECIMEN SOURCE: SIGNIFICANT CHANGE UP
TIBC SERPL-MCNC: 293 UG/DL — SIGNIFICANT CHANGE UP (ref 220–430)
TROPONIN T, HIGH SENSITIVITY RESULT: 62 NG/L — CRITICAL HIGH
TSH SERPL-MCNC: 1.07 UIU/ML — SIGNIFICANT CHANGE UP (ref 0.27–4.2)
UIBC SERPL-MCNC: 275 UG/DL — SIGNIFICANT CHANGE UP (ref 110–370)
WBC # BLD: 4.93 K/UL — SIGNIFICANT CHANGE UP (ref 3.8–10.5)
WBC # BLD: 4.94 K/UL — SIGNIFICANT CHANGE UP (ref 3.8–10.5)
WBC # BLD: 5.03 K/UL — SIGNIFICANT CHANGE UP (ref 3.8–10.5)
WBC # FLD AUTO: 4.93 K/UL — SIGNIFICANT CHANGE UP (ref 3.8–10.5)
WBC # FLD AUTO: 4.94 K/UL — SIGNIFICANT CHANGE UP (ref 3.8–10.5)
WBC # FLD AUTO: 5.03 K/UL — SIGNIFICANT CHANGE UP (ref 3.8–10.5)

## 2023-04-07 PROCEDURE — 93306 TTE W/DOPPLER COMPLETE: CPT | Mod: 26

## 2023-04-07 PROCEDURE — 93970 EXTREMITY STUDY: CPT | Mod: 26

## 2023-04-07 PROCEDURE — 99233 SBSQ HOSP IP/OBS HIGH 50: CPT

## 2023-04-07 RX ORDER — SODIUM CHLORIDE 9 MG/ML
1000 INJECTION, SOLUTION INTRAVENOUS
Refills: 0 | Status: DISCONTINUED | OUTPATIENT
Start: 2023-04-07 | End: 2023-04-07

## 2023-04-07 RX ORDER — ACETAMINOPHEN 500 MG
650 TABLET ORAL EVERY 6 HOURS
Refills: 0 | Status: DISCONTINUED | OUTPATIENT
Start: 2023-04-07 | End: 2023-04-11

## 2023-04-07 RX ORDER — REMDESIVIR 5 MG/ML
INJECTION INTRAVENOUS
Refills: 0 | Status: COMPLETED | OUTPATIENT
Start: 2023-04-07 | End: 2023-04-09

## 2023-04-07 RX ORDER — LATANOPROST 0.05 MG/ML
1 SOLUTION/ DROPS OPHTHALMIC; TOPICAL AT BEDTIME
Refills: 0 | Status: DISCONTINUED | OUTPATIENT
Start: 2023-04-07 | End: 2023-04-11

## 2023-04-07 RX ORDER — SODIUM CHLORIDE 9 MG/ML
1000 INJECTION, SOLUTION INTRAVENOUS
Refills: 0 | Status: DISCONTINUED | OUTPATIENT
Start: 2023-04-07 | End: 2023-04-08

## 2023-04-07 RX ORDER — REMDESIVIR 5 MG/ML
100 INJECTION INTRAVENOUS EVERY 24 HOURS
Refills: 0 | Status: COMPLETED | OUTPATIENT
Start: 2023-04-08 | End: 2023-04-09

## 2023-04-07 RX ORDER — DORZOLAMIDE HYDROCHLORIDE TIMOLOL MALEATE 20; 5 MG/ML; MG/ML
1 SOLUTION/ DROPS OPHTHALMIC
Refills: 0 | Status: DISCONTINUED | OUTPATIENT
Start: 2023-04-07 | End: 2023-04-11

## 2023-04-07 RX ORDER — REMDESIVIR 5 MG/ML
200 INJECTION INTRAVENOUS EVERY 24 HOURS
Refills: 0 | Status: COMPLETED | OUTPATIENT
Start: 2023-04-07 | End: 2023-04-07

## 2023-04-07 RX ORDER — DULOXETINE HYDROCHLORIDE 30 MG/1
30 CAPSULE, DELAYED RELEASE ORAL DAILY
Refills: 0 | Status: DISCONTINUED | OUTPATIENT
Start: 2023-04-07 | End: 2023-04-11

## 2023-04-07 RX ORDER — SODIUM CHLORIDE 9 MG/ML
500 INJECTION INTRAMUSCULAR; INTRAVENOUS; SUBCUTANEOUS ONCE
Refills: 0 | Status: COMPLETED | OUTPATIENT
Start: 2023-04-07 | End: 2023-04-07

## 2023-04-07 RX ADMIN — Medication 650 MILLIGRAM(S): at 06:45

## 2023-04-07 RX ADMIN — SODIUM CHLORIDE 50 MILLILITER(S): 9 INJECTION, SOLUTION INTRAVENOUS at 21:31

## 2023-04-07 RX ADMIN — Medication 650 MILLIGRAM(S): at 07:45

## 2023-04-07 RX ADMIN — DULOXETINE HYDROCHLORIDE 30 MILLIGRAM(S): 30 CAPSULE, DELAYED RELEASE ORAL at 15:59

## 2023-04-07 RX ADMIN — Medication 650 MILLIGRAM(S): at 15:23

## 2023-04-07 RX ADMIN — SODIUM CHLORIDE 50 MILLILITER(S): 9 INJECTION, SOLUTION INTRAVENOUS at 09:56

## 2023-04-07 RX ADMIN — Medication 650 MILLIGRAM(S): at 16:23

## 2023-04-07 RX ADMIN — LATANOPROST 1 DROP(S): 0.05 SOLUTION/ DROPS OPHTHALMIC; TOPICAL at 21:30

## 2023-04-07 RX ADMIN — SODIUM CHLORIDE 1000 MILLILITER(S): 9 INJECTION INTRAMUSCULAR; INTRAVENOUS; SUBCUTANEOUS at 08:36

## 2023-04-07 RX ADMIN — REMDESIVIR 200 MILLIGRAM(S): 5 INJECTION INTRAVENOUS at 09:56

## 2023-04-07 RX ADMIN — DORZOLAMIDE HYDROCHLORIDE TIMOLOL MALEATE 1 DROP(S): 20; 5 SOLUTION/ DROPS OPHTHALMIC at 17:00

## 2023-04-07 NOTE — PHYSICAL THERAPY INITIAL EVALUATION ADULT - ADDITIONAL COMMENTS
Pt. left comfortable in bed with all tubes/lines intact, call bell in reach and in NAD.     heart rate assessed to be 105bpm     pt with confusion throughout session please consult with case management/social work for further clarification of history

## 2023-04-07 NOTE — OCCUPATIONAL THERAPY INITIAL EVALUATION ADULT - RANGE OF MOTION EXAMINATION, UPPER EXTREMITY
Left UE Active Assistive ROM was WFL  (within functional limits)/Right UE Active ROM was WFL (within functional limits)
General

## 2023-04-07 NOTE — OCCUPATIONAL THERAPY INITIAL EVALUATION ADULT - PERTINENT HX OF CURRENT PROBLEM, REHAB EVAL
88 year old female with history of HTN, LE DVT, osteoarthritis, b/l cataracts, SBO (7/2021, conservatively managed), and benign colonic mass s/p R hemicolectomy (1998) presents from home after being found down. Admitted with sepsis likely 2/2 COVID-19.

## 2023-04-07 NOTE — PROVIDER CONTACT NOTE (OTHER) - BACKGROUND
Admit Diagnosis) Syncope and collapse  (PMH) History of small bowel obstruction  (PMH) OA (osteoarthritis)

## 2023-04-07 NOTE — PATIENT PROFILE ADULT - FALL HARM RISK - CONCLUSION
05/21/19 1533   Discharge Assessment   Assessment Type Discharge Planning Assessment   Confirmed/corrected address and phone number on facesheet? Yes   Assessment information obtained from? Patient;Caregiver   Communicated expected length of stay with patient/caregiver no   Prior to hospitilization cognitive status: Alert/Oriented   Prior to hospitalization functional status: Partially Dependent;Needs Assistance;Assistive Equipment   Current cognitive status: Alert/Oriented   Current Functional Status: Partially Dependent;Needs Assistance;Assistive Equipment   Facility Arrived From: 2 story home(bedroom downstairs)   Lives With child(mingo), adult   Able to Return to Prior Arrangements   (TBD)   Is patient able to care for self after discharge? Unable to determine at this time (comments)   Who are your caregiver(s) and their phone number(s)? Erika([phone]   Patient's perception of discharge disposition admitted as an inpatient   Readmission Within the Last 30 Days no previous admission in last 30 days   Patient currently being followed by outpatient case management? No   Patient currently receives any other outside agency services? No   Equipment Currently Used at Home shower chair;walker, rolling;walker, standard;other (see comments)  (seated RW)   Do you have any problems affording any of your prescribed medications? No   Is the patient taking medications as prescribed? yes   Does the patient have transportation home? Yes   Transportation Anticipated family or friend will provide  (daughter)   Does the patient receive services at the [facility]? No   Discharge Plan A Skilled Nursing Facility   Discharge Plan B Home;Home with family;Home Health   DME Needed Upon Discharge  none   Patient/Family in Agreement with Plan yes   PCP: Dr. Maikel Houser [id number] [facility]   Fall with Harm Risk

## 2023-04-07 NOTE — SWALLOW BEDSIDE ASSESSMENT ADULT - SWALLOW EVAL: DIAGNOSIS
1. Functional oral stage for mildly thick liquids and thin liquids marked by adequate oral acceptance, collection and transfer. 2. Mild oral dysphagia for regular solids marked by adequate oral acceptance with slow/prolonged chewing and transfer. 3. functional pharyngeal phase for regular solids and mildly thick liquids marked by a present pharyngeal swallow trigger with hyolaryngeal elevation noted upon digital palpation without evidence of impaired airway protection (Of Note: patient noted to facial grimace post swallow for regular solids with patient stating "I hurts when I swallow"). 4. Moderate pharyngeal dysphagia for thin liquids marked by a suspected delayed pharyngeal swallow trigger with hyolaryngeal elevation noted upon digital palpation with coughing suggestive of impaired airway protection

## 2023-04-07 NOTE — OCCUPATIONAL THERAPY INITIAL EVALUATION ADULT - GENERAL OBSERVATIONS, REHAB EVAL
Patient received semisupine in bed in NAD; agreeable to participate in OT evaluation. +tele. See vital signs flowsheet for orthostatic blood pressure.

## 2023-04-07 NOTE — PHYSICAL THERAPY INITIAL EVALUATION ADULT - PERTINENT HX OF CURRENT PROBLEM, REHAB EVAL
88 year old woman with history of HTN, LE DVT, osteoarthritis, bilateral cataracts, SBO (7/2021, conservatively managed), and benign colonic mass s/p Right hemicolectomy (1998) presents from home after being found down on the floor by pt's cousin,

## 2023-04-07 NOTE — PROGRESS NOTE ADULT - ASSESSMENT
87 yo woman with history of HTN, LE DVT (dx 10/2022, was on Coumadin and ?Eliquis, no longer on AC), osteoarthritis, b/l cataracts, SBO (7/2021, conservatively managed), and benign colonic mass s/p R hemicolectomy (1998) presents from home after being found down on the floor by pt's cousin, Michelle, on Thursday morning, admitted with sepsis likely 2/2 COVID-19.  89 yo woman with history of HTN, LE DVT (dx 10/2022, was on Coumadin and ?Eliquis, no longer on AC), osteoarthritis, b/l cataracts, SBO (7/2021, conservatively managed), and benign colonic mass s/p R hemicolectomy (1998) presents from home after being found down on the floor by pt's cousin, Michelle, on Thursday morning, admitted with sepsis likely 2/2 COVID-19.

## 2023-04-07 NOTE — SWALLOW BEDSIDE ASSESSMENT ADULT - COMMENTS
H&P "89 yo woman with history of HTN, LE DVT (dx 10/2022, was on Coumadin and ?Eliquis, no longer on AC), osteoarthritis, b/l cataracts, SBO (7/2021, conservatively managed), and benign colonic mass s/p R hemicolectomy (1998) presents from home after being found down on the floor by pt's cousin, Michelle, on Thursday morning."    Patient seen at bedside this afternoon for an initial assessment of the swallow, at which time patient was alert. RN reporting patient was on a pureed diet was spitting out food this AM. Patient report "lots of mucus". Patient reporting odynophagia (did not state pain scale) RN made aware. Patient follows simple directives, and verbalizes wants/needs. H&P "87 yo woman with history of HTN, LE DVT (dx 10/2022, was on Coumadin and ?Eliquis, no longer on AC), osteoarthritis, b/l cataracts, SBO (7/2021, conservatively managed), and benign colonic mass s/p R hemicolectomy (1998) presents from home after being found down on the floor by pt's cousin, Michelle, on Thursday morning."    Patient seen at bedside this afternoon for an initial assessment of the swallow, at which time patient was alert. RN reporting patient was on a pureed diet was spitting out food this AM. Patient report "lots of mucus". Patient reporting odynophagia (did not state pain scale) RN made aware. Patient follows simple directives, and verbalizes wants/needs.

## 2023-04-07 NOTE — PATIENT PROFILE ADULT - FALL HARM RISK - HARM RISK INTERVENTIONS
Assistance with ambulation/Assistance OOB with selected safe patient handling equipment/Communicate Risk of Fall with Harm to all staff/Reinforce activity limits and safety measures with patient and family/Tailored Fall Risk Interventions/Visual Cue: Yellow wristband and red socks/Bed in lowest position, wheels locked, appropriate side rails in place/Call bell, personal items and telephone in reach/Instruct patient to call for assistance before getting out of bed or chair/Non-slip footwear when patient is out of bed/[place] to call system/Physically safe environment - no spills, clutter or unnecessary equipment/Purposeful Proactive Rounding/Room/bathroom lighting operational, light cord in reach Assistance with ambulation/Assistance OOB with selected safe patient handling equipment/Communicate Risk of Fall with Harm to all staff/Reinforce activity limits and safety measures with patient and family/Tailored Fall Risk Interventions/Visual Cue: Yellow wristband and red socks/Bed in lowest position, wheels locked, appropriate side rails in place/Call bell, personal items and telephone in reach/Instruct patient to call for assistance before getting out of bed or chair/Non-slip footwear when patient is out of bed/[place] to call system/Physically safe environment - no spills, clutter or unnecessary equipment/Purposeful Proactive Rounding/Room/bathroom lighting operational, light cord in reach Assistance with ambulation/Assistance OOB with selected safe patient handling equipment/Communicate Risk of Fall with Harm to all staff/Reinforce activity limits and safety measures with patient and family/Tailored Fall Risk Interventions/Visual Cue: Yellow wristband and red socks/Bed in lowest position, wheels locked, appropriate side rails in place/Call bell, personal items and telephone in reach/Instruct patient to call for assistance before getting out of bed or chair/Non-slip footwear when patient is out of bed/[place] to call system/Physically safe environment - no spills, clutter or unnecessary equipment/Purposeful Proactive Rounding/Room/bathroom lighting operational, light cord in reach

## 2023-04-07 NOTE — PROGRESS NOTE ADULT - SUBJECTIVE AND OBJECTIVE BOX
PROGRESS NOTE:     Patient is a 88y old  Female who presents with a chief complaint of Fall, weakness, COVID-19 (2023 23:57)      SUBJECTIVE / OVERNIGHT EVENTS: No new complaints. Still having productive cough and sore throat.     ADDITIONAL REVIEW OF SYSTEMS:    MEDICATIONS  (STANDING):  dextrose 5% + lactated ringers. 1000 milliLiter(s) (50 mL/Hr) IV Continuous <Continuous>  remdesivir  IVPB   IV Intermittent     MEDICATIONS  (PRN):  acetaminophen     Tablet .. 650 milliGRAM(s) Oral every 6 hours PRN Temp greater or equal to 38C (100.4F), Mild Pain (1 - 3), Moderate Pain (4 - 6), Severe Pain (7 - 10)      CAPILLARY BLOOD GLUCOSE      POCT Blood Glucose.: 149 mg/dL (2023 10:32)    I&O's Summary    2023 07:01  -  2023 07:00  --------------------------------------------------------  IN: 50 mL / OUT: 0 mL / NET: 50 mL        PHYSICAL EXAM:  Vital Signs Last 24 Hrs  T(C): 37.6 (2023 11:00), Max: 38.9 (2023 08:00)  T(F): 99.6 (2023 11:00), Max: 102 (2023 08:00)  HR: 88 (2023 11:00) (60 - 118)  BP: 101/45 (2023 11:00) (95/49 - 140/58)  BP(mean): 72 (2023 04:35) (60 - 82)  RR: 18 (2023 11:00) (18 - 20)  SpO2: 98% (2023 11:00) (98% - 100%)    Parameters below as of 2023 11:00  Patient On (Oxygen Delivery Method): room air      General: No acute distress.  Eyes: PERRL.  EOMI.  No scleral icterus.  No conjunctival pallor.  Mouth: Moist MM.  No oropharyngeal exudates.    Neck: Supple. No JVD.  No LAD.    Heart: RRR.  Normal S1 and S2.  Grade 3/6 murmur throughout precordium.  No LE edema b/l.   Lungs: Nonlabored breathing.  Good inspiratory effort.  CTAB.  No wheezes, crackles, or rhonchi.    Abdomen: Old surgical scars.  BS+, soft, nontender with no rebound or guarding, nondistended.  No hepatomegaly.  No ecchymoses on flanks b/l.   Skin: Warm and dry.  Pt with chronic venous stasis skin changes with no warmth or TTP, multiple ulcers on lower legs b/l with no active drainage or surrounding erythema.   Extremities: No cyanosis.  2+ peripheral pulses b/l.  Musculoskeletal: No joint deformities.  No spinal or paraspinal tenderness.  Neuro: A&Ox3.  No facial asymmetry, tongue midline on protrusion.  5/5 motor strength in UE and LE b/l.  Tactile sensation intact in UE and LE b/l.  No focal deficits.      LABS:                        8.9    5.03  )-----------( 230      ( 2023 08:16 )             27.8     04-07    141  |  105  |  16  ----------------------------<  78  3.3<L>   |  25  |  0.84    Ca    8.4      2023 08:16  Phos  3.8     04-07  Mg     2.10     04-07    TPro  5.3<L>  /  Alb  3.1<L>  /  TBili  0.2  /  DBili  x   /  AST  49<H>  /  ALT  16  /  AlkPhos  93  04-07      CARDIAC MARKERS ( 2023 06:41 )  x     / x     / 743 U/L / x     / 6.0 ng/mL  CARDIAC MARKERS ( 2023 16:30 )  x     / x     / 943 U/L / x     / x          Urinalysis Basic - ( 2023 19:20 )    Color: Light Yellow / Appearance: Clear / S.021 / pH: x  Gluc: x / Ketone: Small  / Bili: Negative / Urobili: <2 mg/dL   Blood: x / Protein: Trace / Nitrite: Negative   Leuk Esterase: Negative / RBC: x / WBC x   Sq Epi: x / Non Sq Epi: x / Bacteria: x          RADIOLOGY & ADDITIONAL TESTS:  Results Reviewed:   Imaging Personally Reviewed:  Electrocardiogram Personally Reviewed:    COORDINATION OF CARE:  Care Discussed with Consultants/Other Providers [Y/N]:  Prior or Outpatient Records Reviewed [Y/N]:

## 2023-04-07 NOTE — PHARMACOTHERAPY INTERVENTION NOTE - COMMENTS
Current medications reviewed with the patient. Current medication schedule was discussed in detail including: medication name, indication, dose, administration times, treatment duration, side effects, drug interactions, and special instructions. Patient questions and concerns were answered and addressed. Patient demonstrated understanding.     Concha Eaton, PharmD, [facility]  Clinical Pharmacy Specialist  [id number]  Current medications reviewed with the patient. Current medication schedule was discussed in detail including: medication name, indication, dose, administration times, treatment duration, side effects, drug interactions, and special instructions. Patient questions and concerns were answered and addressed. Patient demonstrated understanding.     Concha Eaton, PharmD, [facility]  Clinical Pharmacy Specialist  [id number]  Current medications reviewed with the patient. Current medication schedule was discussed in detail including: medication name, indication, dose, administration times, treatment duration, side effects, drug interactions, and special instructions. Patient questions and concerns were answered and addressed. Patient demonstrated understanding.     Concha Eaton, PharmD, [facility]  Clinical Pharmacy Specialist  [id number]  Current and new medications reviewed with the patient. Current medication schedule was discussed in detail including: medication name, indication, dose, administration times, treatment duration, side effects, drug interactions, and special instructions. Patient questions and concerns were answered and addressed. Patient demonstrated understanding. Patient provided with educational handout.    New medication: carvedilol    Cathleen GoodwinD, [facility]  Clinical Pharmacy Specialist  [id number]  Current and new medications reviewed with the patient. Current medication schedule was discussed in detail including: medication name, indication, dose, administration times, treatment duration, side effects, drug interactions, and special instructions. Patient questions and concerns were answered and addressed. Patient demonstrated understanding. Patient provided with educational handout.    New medication: carvedilol    Cath[facility]D, [facility]  Clinical Pharmacy Specialist  [id number]  Current and new medications reviewed with the patient. Current medication schedule was discussed in detail including: medication name, indication, dose, administration times, treatment duration, side effects, drug interactions, and special instructions. Patient questions and concerns were answered and addressed. Patient demonstrated understanding. Patient provided with educational handout.    New medication: carvedilol    Cathleen GoodwinD, [facility]  Clinical Pharmacy Specialist  [id number]

## 2023-04-07 NOTE — PROVIDER CONTACT NOTE (OTHER) - SITUATION
Pt had 101.5 temperature at 6:45am, night nurse gave PO Tylenol. Vitals rechecked at 8am, 102 oral temp and BP 98/51

## 2023-04-08 LAB
A1C WITH ESTIMATED AVERAGE GLUCOSE RESULT: 5.8 % — HIGH (ref 4–5.6)
ESTIMATED AVERAGE GLUCOSE: 120 — SIGNIFICANT CHANGE UP

## 2023-04-08 PROCEDURE — 99232 SBSQ HOSP IP/OBS MODERATE 35: CPT

## 2023-04-08 RX ORDER — ENOXAPARIN SODIUM 100 MG/ML
30 INJECTION SUBCUTANEOUS EVERY 24 HOURS
Refills: 0 | Status: DISCONTINUED | OUTPATIENT
Start: 2023-04-08 | End: 2023-04-11

## 2023-04-08 RX ADMIN — DULOXETINE HYDROCHLORIDE 30 MILLIGRAM(S): 30 CAPSULE, DELAYED RELEASE ORAL at 08:37

## 2023-04-08 RX ADMIN — LATANOPROST 1 DROP(S): 0.05 SOLUTION/ DROPS OPHTHALMIC; TOPICAL at 21:20

## 2023-04-08 RX ADMIN — ENOXAPARIN SODIUM 30 MILLIGRAM(S): 100 INJECTION SUBCUTANEOUS at 21:20

## 2023-04-08 RX ADMIN — DORZOLAMIDE HYDROCHLORIDE TIMOLOL MALEATE 1 DROP(S): 20; 5 SOLUTION/ DROPS OPHTHALMIC at 08:37

## 2023-04-08 RX ADMIN — REMDESIVIR 200 MILLIGRAM(S): 5 INJECTION INTRAVENOUS at 08:37

## 2023-04-08 NOTE — PROGRESS NOTE ADULT - SUBJECTIVE AND OBJECTIVE BOX
PROGRESS NOTE:     Patient is a 88y old  Female who presents with a chief complaint of Fall, weakness, COVID-19 (2023 15:01)      SUBJECTIVE / OVERNIGHT EVENTS: No acute events.     ADDITIONAL REVIEW OF SYSTEMS:    MEDICATIONS  (STANDING):  dorzolamide 2%/timolol 0.5% Ophthalmic Solution 1 Drop(s) Both EYES <User Schedule>  DULoxetine 30 milliGRAM(s) Oral daily  latanoprost 0.005% Ophthalmic Solution 1 Drop(s) Both EYES at bedtime  remdesivir  IVPB   IV Intermittent   remdesivir  IVPB 100 milliGRAM(s) IV Intermittent every 24 hours    MEDICATIONS  (PRN):  acetaminophen     Tablet .. 650 milliGRAM(s) Oral every 6 hours PRN Temp greater or equal to 38C (100.4F), Mild Pain (1 - 3), Moderate Pain (4 - 6), Severe Pain (7 - 10)      CAPILLARY BLOOD GLUCOSE      POCT Blood Glucose.: 122 mg/dL (2023 22:10)  POCT Blood Glucose.: 111 mg/dL (2023 16:47)    I&O's Summary    2023 07:01  -  2023 07:00  --------------------------------------------------------  IN: 50 mL / OUT: 1550 mL / NET: -1500 mL        PHYSICAL EXAM:  Vital Signs Last 24 Hrs  T(C): 36.9 (2023 13:00), Max: 38.4 (2023 16:30)  T(F): 98.4 (2023 13:00), Max: 101.2 (2023 16:30)  HR: 76 (2023 13:00) (76 - 88)  BP: 117/53 (2023 13:00) (105/59 - 124/58)  BP(mean): --  RR: 20 (2023 13:00) (18 - 20)  SpO2: 100% (2023 13:00) (98% - 100%)    Parameters below as of 2023 13:00  Patient On (Oxygen Delivery Method): room air     General: No acute distress.  Eyes: PERRL.  EOMI.  No scleral icterus.  No conjunctival pallor.  Mouth: Moist MM.  No oropharyngeal exudates.    Neck: Supple. No JVD.  No LAD.    Heart: RRR.  Normal S1 and S2.  Grade 3/6 murmur throughout precordium.  No LE edema b/l.   Lungs: Nonlabored breathing.  Good inspiratory effort.  CTAB.  No wheezes, crackles, or rhonchi.    Abdomen: Old surgical scars.  BS+, soft, nontender with no rebound or guarding, nondistended.  No hepatomegaly.  No ecchymoses on flanks b/l.   Skin: Warm and dry.  Pt with chronic venous stasis skin changes with no warmth or TTP, multiple ulcers on lower legs b/l with no active drainage or surrounding erythema.   Extremities: No cyanosis.  2+ peripheral pulses b/l.  Musculoskeletal: No joint deformities.  No spinal or paraspinal tenderness.  Neuro: A&Ox3.  No facial asymmetry, tongue midline on protrusion.  5/5 motor strength in UE and LE b/l.  Tactile sensation intact in UE and LE b/l.  No focal deficits.    LABS:                        9.1    4.93  )-----------( 244      ( 2023 19:30 )             28.5     04-07    141  |  105  |  16  ----------------------------<  78  3.3<L>   |  25  |  0.84    Ca    8.4      2023 08:16  Phos  3.8     04-07  Mg     2.10     04-07    TPro  5.3<L>  /  Alb  3.1<L>  /  TBili  0.2  /  DBili  x   /  AST  49<H>  /  ALT  16  /  AlkPhos  93  04-07      CARDIAC MARKERS ( 2023 06:41 )  x     / x     / 743 U/L / x     / 6.0 ng/mL  CARDIAC MARKERS ( 2023 16:30 )  x     / x     / 943 U/L / x     / x          Urinalysis Basic - ( 2023 19:20 )    Color: Light Yellow / Appearance: Clear / S.021 / pH: x  Gluc: x / Ketone: Small  / Bili: Negative / Urobili: <2 mg/dL   Blood: x / Protein: Trace / Nitrite: Negative   Leuk Esterase: Negative / RBC: x / WBC x   Sq Epi: x / Non Sq Epi: x / Bacteria: x        Culture - Urine (collected 2023 19:20)  Source: Clean Catch Clean Catch (Midstream)  Final Report (2023 23:11):    No growth    Culture - Blood (collected 2023 17:05)  Source: .Blood Blood-Peripheral  Preliminary Report (2023 01:02):    No growth to date.    Culture - Blood (collected 2023 17:00)  Source: .Blood Blood-Peripheral  Preliminary Report (2023 01:02):    No growth to date.        RADIOLOGY & ADDITIONAL TESTS:  Results Reviewed:   Imaging Personally Reviewed:  Electrocardiogram Personally Reviewed:  TTE:  1. Mitral annular calcification, otherwise normal mitral  valve. Minimal mitral regurgitation.  2. Aortic valve leaflet morphology not well visualized.  The valve is calcified. Peak transaortic valve gradient  equals 19 mm Hg, mean transaortic valve gradient equals 11  mm Hg, consistent with mild aortic stenosis. Mild aortic  regurgitation.  3. Normal left ventricular internal dimensions and wall  thicknesses.  4. Mild global left ventricular systolic dysfunction.  5. Mild diastolic dysfunction (Stage I).  6. The right ventricle is not well visualized; grossly  normal right ventricular systolic function.  7. Estimated right ventricular systolic pressure equals 44  mm Hg, assuming right atrial pressure equals 10 mm Hg,  consistent with mild pulmonary hypertension.      COORDINATION OF CARE:  Care Discussed with Consultants/Other Providers [Y/N]:  Prior or Outpatient Records Reviewed [Y/N]:

## 2023-04-09 DIAGNOSIS — I50.20 UNSPECIFIED SYSTOLIC (CONGESTIVE) HEART FAILURE: ICD-10-CM

## 2023-04-09 LAB
ANION GAP SERPL CALC-SCNC: 11 MMOL/L — SIGNIFICANT CHANGE UP (ref 7–14)
BUN SERPL-MCNC: 17 MG/DL — SIGNIFICANT CHANGE UP (ref 7–23)
CALCIUM SERPL-MCNC: 8.4 MG/DL — SIGNIFICANT CHANGE UP (ref 8.4–10.5)
CHLORIDE SERPL-SCNC: 103 MMOL/L — SIGNIFICANT CHANGE UP (ref 98–107)
CO2 SERPL-SCNC: 24 MMOL/L — SIGNIFICANT CHANGE UP (ref 22–31)
CREAT SERPL-MCNC: 0.61 MG/DL — SIGNIFICANT CHANGE UP (ref 0.5–1.3)
EGFR: 86 ML/MIN/1.73M2 — SIGNIFICANT CHANGE UP
FOLATE SERPL-MCNC: 17 NG/ML — SIGNIFICANT CHANGE UP (ref 3.1–17.5)
GLUCOSE SERPL-MCNC: 85 MG/DL — SIGNIFICANT CHANGE UP (ref 70–99)
HCT VFR BLD CALC: 28.4 % — LOW (ref 34.5–45)
HGB BLD-MCNC: 9.1 G/DL — LOW (ref 11.5–15.5)
MAGNESIUM SERPL-MCNC: 2.2 MG/DL — SIGNIFICANT CHANGE UP (ref 1.6–2.6)
MCHC RBC-ENTMCNC: 26.6 PG — LOW (ref 27–34)
MCHC RBC-ENTMCNC: 32 GM/DL — SIGNIFICANT CHANGE UP (ref 32–36)
MCV RBC AUTO: 83 FL — SIGNIFICANT CHANGE UP (ref 80–100)
NRBC # BLD: 0 /100 WBCS — SIGNIFICANT CHANGE UP (ref 0–0)
NRBC # FLD: 0 K/UL — SIGNIFICANT CHANGE UP (ref 0–0)
PHOSPHATE SERPL-MCNC: 3.3 MG/DL — SIGNIFICANT CHANGE UP (ref 2.5–4.5)
PLATELET # BLD AUTO: 232 K/UL — SIGNIFICANT CHANGE UP (ref 150–400)
POTASSIUM SERPL-MCNC: 3.5 MMOL/L — SIGNIFICANT CHANGE UP (ref 3.5–5.3)
POTASSIUM SERPL-SCNC: 3.5 MMOL/L — SIGNIFICANT CHANGE UP (ref 3.5–5.3)
RBC # BLD: 3.42 M/UL — LOW (ref 3.8–5.2)
RBC # FLD: 15.9 % — HIGH (ref 10.3–14.5)
SODIUM SERPL-SCNC: 138 MMOL/L — SIGNIFICANT CHANGE UP (ref 135–145)
VIT B12 SERPL-MCNC: 703 PG/ML — SIGNIFICANT CHANGE UP (ref 200–900)
WBC # BLD: 3.34 K/UL — LOW (ref 3.8–10.5)
WBC # FLD AUTO: 3.34 K/UL — LOW (ref 3.8–10.5)

## 2023-04-09 PROCEDURE — 99232 SBSQ HOSP IP/OBS MODERATE 35: CPT

## 2023-04-09 RX ORDER — CARVEDILOL PHOSPHATE 80 MG/1
3.12 CAPSULE, EXTENDED RELEASE ORAL EVERY 12 HOURS
Refills: 0 | Status: DISCONTINUED | OUTPATIENT
Start: 2023-04-09 | End: 2023-04-11

## 2023-04-09 RX ADMIN — LATANOPROST 1 DROP(S): 0.05 SOLUTION/ DROPS OPHTHALMIC; TOPICAL at 20:24

## 2023-04-09 RX ADMIN — CARVEDILOL PHOSPHATE 3.12 MILLIGRAM(S): 80 CAPSULE, EXTENDED RELEASE ORAL at 16:51

## 2023-04-09 RX ADMIN — REMDESIVIR 200 MILLIGRAM(S): 5 INJECTION INTRAVENOUS at 08:41

## 2023-04-09 RX ADMIN — ENOXAPARIN SODIUM 30 MILLIGRAM(S): 100 INJECTION SUBCUTANEOUS at 20:30

## 2023-04-09 RX ADMIN — DORZOLAMIDE HYDROCHLORIDE TIMOLOL MALEATE 1 DROP(S): 20; 5 SOLUTION/ DROPS OPHTHALMIC at 08:42

## 2023-04-09 RX ADMIN — DULOXETINE HYDROCHLORIDE 30 MILLIGRAM(S): 30 CAPSULE, DELAYED RELEASE ORAL at 08:41

## 2023-04-09 NOTE — PROGRESS NOTE ADULT - SUBJECTIVE AND OBJECTIVE BOX
PROGRESS NOTE:     Patient is a 88y old  Female who presents with a chief complaint of Fall, weakness, COVID-19 (08 Apr 2023 15:47)      SUBJECTIVE / OVERNIGHT EVENTS: Still coughing.     ADDITIONAL REVIEW OF SYSTEMS:    MEDICATIONS  (STANDING):  dorzolamide 2%/timolol 0.5% Ophthalmic Solution 1 Drop(s) Both EYES <User Schedule>  DULoxetine 30 milliGRAM(s) Oral daily  enoxaparin Injectable 30 milliGRAM(s) SubCutaneous every 24 hours  latanoprost 0.005% Ophthalmic Solution 1 Drop(s) Both EYES at bedtime    MEDICATIONS  (PRN):  acetaminophen     Tablet .. 650 milliGRAM(s) Oral every 6 hours PRN Temp greater or equal to 38C (100.4F), Mild Pain (1 - 3), Moderate Pain (4 - 6), Severe Pain (7 - 10)      CAPILLARY BLOOD GLUCOSE        I&O's Summary    08 Apr 2023 07:01  -  09 Apr 2023 07:00  --------------------------------------------------------  IN: 720 mL / OUT: 600 mL / NET: 120 mL    09 Apr 2023 07:01  -  09 Apr 2023 11:20  --------------------------------------------------------  IN: 240 mL / OUT: 0 mL / NET: 240 mL        PHYSICAL EXAM:  Vital Signs Last 24 Hrs  T(C): 37 (09 Apr 2023 05:00), Max: 37.2 (08 Apr 2023 21:13)  T(F): 98.6 (09 Apr 2023 05:00), Max: 99 (08 Apr 2023 21:13)  HR: 76 (09 Apr 2023 05:00) (76 - 85)  BP: 142/71 (09 Apr 2023 05:00) (117/53 - 142/71)  BP(mean): --  RR: 18 (09 Apr 2023 05:00) (18 - 20)  SpO2: 97% (09 Apr 2023 05:00) (97% - 100%)    Parameters below as of 09 Apr 2023 05:00  Patient On (Oxygen Delivery Method): room air      General: No acute distress.  Eyes: PERRL.  EOMI.  No scleral icterus.  No conjunctival pallor.  Mouth: Moist MM.  No oropharyngeal exudates.    Neck: Supple. No JVD.  No LAD.    Heart: RRR.  Normal S1 and S2.  Grade 3/6 murmur throughout precordium.  No LE edema b/l.   Lungs: Nonlabored breathing.  Good inspiratory effort.  CTAB.  No wheezes, crackles, or rhonchi.    Abdomen: Old surgical scars.  BS+, soft, nontender with no rebound or guarding, nondistended.  No hepatomegaly.  No ecchymoses on flanks b/l.   Skin: Warm and dry.  Pt with chronic venous stasis skin changes with no warmth or TTP, multiple ulcers on lower legs b/l with no active drainage or surrounding erythema.   Extremities: No cyanosis.  2+ peripheral pulses b/l.  Musculoskeletal: No joint deformities.  No spinal or paraspinal tenderness.  Neuro: A&Ox3.  No facial asymmetry, tongue midline on protrusion.  5/5 motor strength in UE and LE b/l.  Tactile sensation intact in UE and LE b/l.  No focal deficits.    LABS:                        9.1    3.34  )-----------( 232      ( 09 Apr 2023 05:35 )             28.4     04-09    138  |  103  |  17  ----------------------------<  85  3.5   |  24  |  0.61    Ca    8.4      09 Apr 2023 05:35  Phos  3.3     04-09  Mg     2.20     04-09        Culture - Urine (collected 06 Apr 2023 19:20)  Source: Clean Catch Clean Catch (Midstream)  Final Report (07 Apr 2023 23:11):    No growth    Culture - Blood (collected 06 Apr 2023 17:05)  Source: .Blood Blood-Peripheral  Preliminary Report (08 Apr 2023 01:02):    No growth to date.    Culture - Blood (collected 06 Apr 2023 17:00)  Source: .Blood Blood-Peripheral  Preliminary Report (08 Apr 2023 01:02):    No growth to date.        RADIOLOGY & ADDITIONAL TESTS:  Results Reviewed:   Imaging Personally Reviewed:  Electrocardiogram Personally Reviewed:    COORDINATION OF CARE:  Care Discussed with Consultants/Other Providers [Y/N]:  Prior or Outpatient Records Reviewed [Y/N]:

## 2023-04-09 NOTE — PROGRESS NOTE ADULT - ASSESSMENT
89 yo woman with history of HTN, LE DVT (dx 10/2022, was on Coumadin and ?Eliquis, no longer on AC), osteoarthritis, b/l cataracts, SBO (7/2021, conservatively managed), and benign colonic mass s/p R hemicolectomy (1998) presents from home after being found down on the floor by pt's cousin, Michelle, on Thursday morning, admitted with sepsis likely 2/2 COVID-19.

## 2023-04-10 LAB
ANION GAP SERPL CALC-SCNC: 12 MMOL/L — SIGNIFICANT CHANGE UP (ref 7–14)
BASOPHILS # BLD AUTO: 0.02 K/UL — SIGNIFICANT CHANGE UP (ref 0–0.2)
BASOPHILS NFR BLD AUTO: 0.6 % — SIGNIFICANT CHANGE UP (ref 0–2)
BUN SERPL-MCNC: 16 MG/DL — SIGNIFICANT CHANGE UP (ref 7–23)
CALCIUM SERPL-MCNC: 8.6 MG/DL — SIGNIFICANT CHANGE UP (ref 8.4–10.5)
CHLORIDE SERPL-SCNC: 102 MMOL/L — SIGNIFICANT CHANGE UP (ref 98–107)
CO2 SERPL-SCNC: 25 MMOL/L — SIGNIFICANT CHANGE UP (ref 22–31)
CREAT SERPL-MCNC: 0.59 MG/DL — SIGNIFICANT CHANGE UP (ref 0.5–1.3)
CRP SERPL-MCNC: 37.4 MG/L — HIGH
D DIMER BLD IA.RAPID-MCNC: 302 NG/ML DDU — HIGH
EGFR: 87 ML/MIN/1.73M2 — SIGNIFICANT CHANGE UP
EOSINOPHIL # BLD AUTO: 0.04 K/UL — SIGNIFICANT CHANGE UP (ref 0–0.5)
EOSINOPHIL NFR BLD AUTO: 1.2 % — SIGNIFICANT CHANGE UP (ref 0–6)
ERYTHROCYTE [SEDIMENTATION RATE] IN BLOOD: 23 MM/HR — SIGNIFICANT CHANGE UP (ref 4–25)
FERRITIN SERPL-MCNC: 70 NG/ML — SIGNIFICANT CHANGE UP (ref 15–150)
GLUCOSE SERPL-MCNC: 82 MG/DL — SIGNIFICANT CHANGE UP (ref 70–99)
HCT VFR BLD CALC: 31.4 % — LOW (ref 34.5–45)
HGB BLD-MCNC: 9.9 G/DL — LOW (ref 11.5–15.5)
IANC: 1.84 K/UL — SIGNIFICANT CHANGE UP (ref 1.8–7.4)
IMM GRANULOCYTES NFR BLD AUTO: 0 % — SIGNIFICANT CHANGE UP (ref 0–0.9)
LDH SERPL L TO P-CCNC: 253 U/L — HIGH (ref 135–225)
LYMPHOCYTES # BLD AUTO: 1.19 K/UL — SIGNIFICANT CHANGE UP (ref 1–3.3)
LYMPHOCYTES # BLD AUTO: 35.5 % — SIGNIFICANT CHANGE UP (ref 13–44)
MAGNESIUM SERPL-MCNC: 2.1 MG/DL — SIGNIFICANT CHANGE UP (ref 1.6–2.6)
MCHC RBC-ENTMCNC: 25.8 PG — LOW (ref 27–34)
MCHC RBC-ENTMCNC: 31.5 GM/DL — LOW (ref 32–36)
MCV RBC AUTO: 82 FL — SIGNIFICANT CHANGE UP (ref 80–100)
MONOCYTES # BLD AUTO: 0.26 K/UL — SIGNIFICANT CHANGE UP (ref 0–0.9)
MONOCYTES NFR BLD AUTO: 7.8 % — SIGNIFICANT CHANGE UP (ref 2–14)
NEUTROPHILS # BLD AUTO: 1.84 K/UL — SIGNIFICANT CHANGE UP (ref 1.8–7.4)
NEUTROPHILS NFR BLD AUTO: 54.9 % — SIGNIFICANT CHANGE UP (ref 43–77)
NRBC # BLD: 0 /100 WBCS — SIGNIFICANT CHANGE UP (ref 0–0)
NRBC # FLD: 0 K/UL — SIGNIFICANT CHANGE UP (ref 0–0)
PHOSPHATE SERPL-MCNC: 3.5 MG/DL — SIGNIFICANT CHANGE UP (ref 2.5–4.5)
PLATELET # BLD AUTO: 238 K/UL — SIGNIFICANT CHANGE UP (ref 150–400)
POTASSIUM SERPL-MCNC: 3.5 MMOL/L — SIGNIFICANT CHANGE UP (ref 3.5–5.3)
POTASSIUM SERPL-SCNC: 3.5 MMOL/L — SIGNIFICANT CHANGE UP (ref 3.5–5.3)
PROCALCITONIN SERPL-MCNC: 0.1 NG/ML — SIGNIFICANT CHANGE UP (ref 0.02–0.1)
RBC # BLD: 3.83 M/UL — SIGNIFICANT CHANGE UP (ref 3.8–5.2)
RBC # FLD: 15.4 % — HIGH (ref 10.3–14.5)
SODIUM SERPL-SCNC: 139 MMOL/L — SIGNIFICANT CHANGE UP (ref 135–145)
WBC # BLD: 3.35 K/UL — LOW (ref 3.8–10.5)
WBC # FLD AUTO: 3.35 K/UL — LOW (ref 3.8–10.5)

## 2023-04-10 PROCEDURE — 99232 SBSQ HOSP IP/OBS MODERATE 35: CPT

## 2023-04-10 PROCEDURE — 71250 CT THORAX DX C-: CPT | Mod: 26

## 2023-04-10 PROCEDURE — 74176 CT ABD & PELVIS W/O CONTRAST: CPT | Mod: 26

## 2023-04-10 RX ADMIN — LATANOPROST 1 DROP(S): 0.05 SOLUTION/ DROPS OPHTHALMIC; TOPICAL at 21:12

## 2023-04-10 RX ADMIN — CARVEDILOL PHOSPHATE 3.12 MILLIGRAM(S): 80 CAPSULE, EXTENDED RELEASE ORAL at 17:50

## 2023-04-10 RX ADMIN — DULOXETINE HYDROCHLORIDE 30 MILLIGRAM(S): 30 CAPSULE, DELAYED RELEASE ORAL at 09:35

## 2023-04-10 RX ADMIN — DORZOLAMIDE HYDROCHLORIDE TIMOLOL MALEATE 1 DROP(S): 20; 5 SOLUTION/ DROPS OPHTHALMIC at 09:36

## 2023-04-10 RX ADMIN — ENOXAPARIN SODIUM 30 MILLIGRAM(S): 100 INJECTION SUBCUTANEOUS at 21:12

## 2023-04-10 RX ADMIN — CARVEDILOL PHOSPHATE 3.12 MILLIGRAM(S): 80 CAPSULE, EXTENDED RELEASE ORAL at 05:13

## 2023-04-10 NOTE — ADVANCED PRACTICE NURSE CONSULT - RECOMMEDATIONS
Topical recommendations:     RLE wounds - Cleanse with NS, pat dry. Apply Liquid barrier film to periwound skin (allow to dry). Apply medihoney gel to base of right anterior lower leg wound. Cover with silicone foam with border. Change daily and PRN if soiled.     BLE scabs - paint with betadine daily.   After wound dressings are in place apply sween24 moisturizer daily to BLE, avoid in between the toes.     Continue low air loss bed therapy, continue heel elevation while in bed, continue to turn & reposition per protocol, soft pillow between bony prominences, continue moisture management with single breathable pad, continue measures to decrease friction/shear/pressure.     Plan discussed with patient and primary RN Michele Reece.   Please contact Wound/Ostomy Care Service Line if we can be of further assistance (ext [phone]).    Topical recommendations:     RLE wounds - Cleanse with NS, pat dry. Apply Liquid barrier film to periwound skin (allow to dry). Apply medihoney gel to base of right anterior lower leg wound. Cover with silicone foam with border. Change daily and PRN if soiled.     BLE scabs - paint with betadine daily.   After wound dressings are in place apply sween24 moisturizer daily to BLE, avoid in between the toes.     Continue low air loss bed therapy, continue heel elevation while in bed, continue to turn & reposition per protocol, soft pillow between bony prominences, continue moisture management with single breathable pad, continue measures to decrease friction/shear/pressure.     Plan discussed with patient and primary RN Michele Reece.   Please contact Wound/Ostomy Care Service Line if we can be of further assistance (ext [phone]).    Topical recommendations:     RLE wounds - Cleanse with NS, pat dry. Apply Liquid barrier film to periwound skin (allow to dry). Apply medihoney gel to base of right anterior lower leg wound. Cover with silicone foam with border. Change daily and PRN if soiled.     BLE scabs - paint with betadine daily.   After wound dressings are in place apply sween24 moisturizer daily to BLE, avoid in between the toes.     Continue low air loss bed therapy, continue heel elevation while in bed, continue to turn & reposition per protocol, soft pillow between bony prominences, continue moisture management with single breathable pad, continue measures to decrease friction/shear/pressure.     Plan discussed with patient and primary RN Michele Reece.   Please contact Wound/Ostomy Care Service Line if we can be of further assistance (ext [phone]).    Topical recommendations:     RLE wounds - Cleanse with NS, pat dry. Apply Liquid barrier film to periwound skin (allow to dry). Apply medihoney gel to base of right anterior lower leg wound. Cover with silicone foam with border. Change daily and PRN if soiled.     BLE scabs - paint with betadine daily.   After wound dressings are in place apply sween24 moisturizer daily to BLE, avoid in between the toes.     Continue low air loss bed therapy, continue heel elevation while in bed, continue to turn & reposition per protocol, soft pillow between bony prominences, continue moisture management with moisture barrier cream and single breathable pad, continue measures to decrease friction/shear/pressure. Continue with incontinence care as per protocol.    Plan discussed with patient and primary RN Michele Reece.   Please contact Wound/Ostomy Care Service Line if we can be of further assistance (ext [phone]).    Topical recommendations:     RLE wounds - Cleanse with NS, pat dry. Apply Liquid barrier film to periwound skin (allow to dry). Apply medihoney gel to base of right anterior lower leg wound. Cover with silicone foam with border. Change daily and PRN if soiled.     BLE scabs - paint with betadine daily.   After wound dressings are in place apply sween24 moisturizer daily to BLE, avoid in between the toes.     Continue low air loss bed therapy, continue heel elevation while in bed, continue to turn & reposition per protocol, soft pillow between bony prominences, continue moisture management with moisture barrier cream and single breathable pad, continue measures to decrease friction/shear/pressure. Continue with incontinence care as per protocol.    Plan discussed with patient and primary RN Michele Reece.   Please contact Wound/Ostomy Care Service Line if we can be of further assistance (ext [phone]).    Topical recommendations:     RLE wounds - Cleanse with NS, pat dry. Apply Liquid barrier film to periwound skin (allow to dry). Apply medihoney gel to base of right anterior lower leg wound. Cover with silicone foam with border. Change daily and PRN if soiled.     BLE scabs - paint with betadine daily.   After wound dressings are in place apply sween24 moisturizer daily to BLE, avoid in between the toes.     Continue low air loss bed therapy, continue heel elevation while in bed, continue to turn & reposition per protocol, soft pillow between bony prominences, continue moisture management with moisture barrier cream and single breathable pad, continue measures to decrease friction/shear/pressure. Continue with incontinence care as per protocol.    Plan discussed with patient and primary RN Michele Reece.   Please contact Wound/Ostomy Care Service Line if we can be of further assistance (ext [phone]).

## 2023-04-10 NOTE — CONSULT NOTE ADULT - SUBJECTIVE AND OBJECTIVE BOX
Cardiovascular Disease Initial Evaluation  Date of Service: 04-10-23 @ 09:05    CHIEF COMPLAINT: Weakness    HISTORY OF PRESENT ILLNESS:    This is an 88 year old woman with HTN, LE DVT (dx 10/2022, was on Coumadin and ?Eliquis, no longer on AC), and OA who presented to [facility] On 4/6/2023 after being found down on the floor by pt's cousinMichelle  In the ED, pt sleepy but arousable and able to answer some questions when pt awoken from sleep.  She was noted to be COVID-19 +.  Cardiology is consulted for LV dysfunction.         Allergies  No Known Allergies    Intolerances    	    MEDICATIONS:  carvedilol 3.125 milliGRAM(s) Oral every 12 hours  enoxaparin Injectable 30 milliGRAM(s) SubCutaneous every 24 hours        acetaminophen     Tablet .. 650 milliGRAM(s) Oral every 6 hours PRN  DULoxetine 30 milliGRAM(s) Oral daily        dorzolamide 2%/timolol 0.5% Ophthalmic Solution 1 Drop(s) Both EYES <User Schedule>  latanoprost 0.005% Ophthalmic Solution 1 Drop(s) Both EYES at bedtime      PAST MEDICAL & SURGICAL HISTORY:  HTN (hypertension)      DVT, lower extremity      OA (osteoarthritis)      History of small bowel obstruction      History of right hemicolectomy      Bilateral cataracts          FAMILY HISTORY:  No pertinent family history in first degree relatives        SOCIAL HISTORY:    The patient is a nonsmoker       REVIEW OF SYSTEMS:  See HPI, otherwise complete 14 point review of systems negative      PHYSICAL EXAM:  T(C): 37.1 (04-10-23 @ 05:29), Max: 37.1 (04-09-23 @ 12:13)  HR: 75 (04-10-23 @ 05:29) (70 - 88)  BP: 134/72 (04-10-23 @ 05:29) (134/72 - 157/69)  RR: 16 (04-10-23 @ 05:29) (16 - 18)  SpO2: 98% (04-10-23 @ 05:29) (98% - 99%)  Wt(kg): --  I&O's Summary    09 Apr 2023 07:01  -  10 Apr 2023 07:00  --------------------------------------------------------  IN: 1360 mL / OUT: 400 mL / NET: 960 mL        Appearance: No Acute Distress; resting comfortably  HEENT:  Normal oral mucosa, PERRL, EOMI	  Cardiovascular: Normal S1 S2, No JVD, No murmurs/rubs/gallops  Respiratory: Normal respiratory effort; Lungs clear to auscultation bilaterally  Gastrointestinal:  Soft, Non-tender, + BS	  Skin: No rashes, No ecchymoses, No cyanosis	  Neurologic: Non-focal; no weakness  Extremities: No clubbing, cyanosis or edema  Vascular: Peripheral pulses palpable 2+ bilaterally  Psychiatry: Alert, Mood & affect appropriate    Laboratory Data:	 	    CBC Full  -  ( 10 Apr 2023 05:30 )  WBC Count : 3.35 K/uL  Hemoglobin : 9.9 g/dL  Hematocrit : 31.4 %  Platelet Count - Automated : 238 K/uL  Mean Cell Volume : 82.0 fL  Mean Cell Hemoglobin : 25.8 pg  Mean Cell Hemoglobin Concentration : 31.5 gm/dL  Auto Neutrophil # : 1.84 K/uL  Auto Lymphocyte # : 1.19 K/uL  Auto Monocyte # : 0.26 K/uL  Auto Eosinophil # : 0.04 K/uL  Auto Basophil # : 0.02 K/uL  Auto Neutrophil % : 54.9 %  Auto Lymphocyte % : 35.5 %  Auto Monocyte % : 7.8 %  Auto Eosinophil % : 1.2 %  Auto Basophil % : 0.6 %    04-10    139  |  102  |  16  ----------------------------<  82  3.5   |  25  |  0.59  04-09    138  |  103  |  17  ----------------------------<  85  3.5   |  24  |  0.61    Ca    8.6      10 Apr 2023 05:30  Ca    8.4      09 Apr 2023 05:35  Phos  3.5     04-10  Phos  3.3     04-09  Mg     2.10     04-10  Mg     2.20     04-09      Interpretation of Telemetry: Sinus	    ECG:  	Sinus; nonspecific t-wave inversions.     Assessment: 88 year old woman with HTN, LE DVT (dx 10/2022, no longer on AC), and OA presents with cardiomyopathy and supply demand ischemia in the setting of COVID-19 infection.    Plan of Care:    #Supply demand ischemia-  Secondary to sepsis and COVID-19 infection.  Ms. Acuna denies chest pain.  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  I agree with low dose Coreg.  She is euvolemic on exam off of diuretics.  I would not start an ACEi/ARB given fall risk.   Given debility and poor functional status, the risks of a coronary ischemic work up outweighs the benefits.  Repeat echo in 3 months.       52 minutes spent on total encounter; more than 50% of the visit was spent counseling and/or coordinating care by the attending physician.   	  Juan Pablo Dudley MD [facility]  Cardiovascular Diseases  [phone]     Cardiovascular Disease Initial Evaluation  Date of Service: 04-10-23 @ 09:05    CHIEF COMPLAINT: Weakness    HISTORY OF PRESENT ILLNESS:    This is an 88 year old woman with HTN, LE DVT (dx 10/2022, was on Coumadin and ?Eliquis, no longer on AC), and OA who presented to [facility] On 4/6/2023 after being found down on the floor by pt's cousinMichelle  In the ED, pt sleepy but arousable and able to answer some questions when pt awoken from sleep.  She was noted to be COVID-19 +.  Cardiology is consulted for LV dysfunction.         Allergies  No Known Allergies    Intolerances    	    MEDICATIONS:  carvedilol 3.125 milliGRAM(s) Oral every 12 hours  enoxaparin Injectable 30 milliGRAM(s) SubCutaneous every 24 hours        acetaminophen     Tablet .. 650 milliGRAM(s) Oral every 6 hours PRN  DULoxetine 30 milliGRAM(s) Oral daily        dorzolamide 2%/timolol 0.5% Ophthalmic Solution 1 Drop(s) Both EYES <User Schedule>  latanoprost 0.005% Ophthalmic Solution 1 Drop(s) Both EYES at bedtime      PAST MEDICAL & SURGICAL HISTORY:  HTN (hypertension)      DVT, lower extremity      OA (osteoarthritis)      History of small bowel obstruction      History of right hemicolectomy      Bilateral cataracts          FAMILY HISTORY:  No pertinent family history in first degree relatives        SOCIAL HISTORY:    The patient is a nonsmoker       REVIEW OF SYSTEMS:  See HPI, otherwise complete 14 point review of systems negative      PHYSICAL EXAM:  T(C): 37.1 (04-10-23 @ 05:29), Max: 37.1 (04-09-23 @ 12:13)  HR: 75 (04-10-23 @ 05:29) (70 - 88)  BP: 134/72 (04-10-23 @ 05:29) (134/72 - 157/69)  RR: 16 (04-10-23 @ 05:29) (16 - 18)  SpO2: 98% (04-10-23 @ 05:29) (98% - 99%)  Wt(kg): --  I&O's Summary    09 Apr 2023 07:01  -  10 Apr 2023 07:00  --------------------------------------------------------  IN: 1360 mL / OUT: 400 mL / NET: 960 mL        Appearance: No Acute Distress; resting comfortably  HEENT:  Normal oral mucosa, PERRL, EOMI	  Cardiovascular: Normal S1 S2, No JVD, No murmurs/rubs/gallops  Respiratory: Normal respiratory effort; Lungs clear to auscultation bilaterally  Gastrointestinal:  Soft, Non-tender, + BS	  Skin: No rashes, No ecchymoses, No cyanosis	  Neurologic: Non-focal; no weakness  Extremities: No clubbing, cyanosis or edema  Vascular: Peripheral pulses palpable 2+ bilaterally  Psychiatry: Alert, Mood & affect appropriate    Laboratory Data:	 	    CBC Full  -  ( 10 Apr 2023 05:30 )  WBC Count : 3.35 K/uL  Hemoglobin : 9.9 g/dL  Hematocrit : 31.4 %  Platelet Count - Automated : 238 K/uL  Mean Cell Volume : 82.0 fL  Mean Cell Hemoglobin : 25.8 pg  Mean Cell Hemoglobin Concentration : 31.5 gm/dL  Auto Neutrophil # : 1.84 K/uL  Auto Lymphocyte # : 1.19 K/uL  Auto Monocyte # : 0.26 K/uL  Auto Eosinophil # : 0.04 K/uL  Auto Basophil # : 0.02 K/uL  Auto Neutrophil % : 54.9 %  Auto Lymphocyte % : 35.5 %  Auto Monocyte % : 7.8 %  Auto Eosinophil % : 1.2 %  Auto Basophil % : 0.6 %    04-10    139  |  102  |  16  ----------------------------<  82  3.5   |  25  |  0.59  04-09    138  |  103  |  17  ----------------------------<  85  3.5   |  24  |  0.61    Ca    8.6      10 Apr 2023 05:30  Ca    8.4      09 Apr 2023 05:35  Phos  3.5     04-10  Phos  3.3     04-09  Mg     2.10     04-10  Mg     2.20     04-09      Interpretation of Telemetry: Sinus	    ECG:  	Sinus; nonspecific t-wave inversions.     Assessment: 88 year old woman with HTN, LE DVT (dx 10/2022, no longer on AC), and OA presents with cardiomyopathy and supply demand ischemia in the setting of COVID-19 infection.    Plan of Care:    #Supply demand ischemia-  Secondary to sepsis and COVID-19 infection.  Ms. Acuna denies chest pain.  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  I agree with low dose Coreg.  She is euvolemic on exam off of diuretics.  I would not start an ACEi/ARB given fall risk.   Given debility and poor functional status, the risks of a coronary ischemic work up outweighs the benefits.  Repeat echo in 3 months.       52 minutes spent on total encounter; more than 50% of the visit was spent counseling and/or coordinating care by the attending physician.   	  Juan Pablo Dudley MD [facility]  Cardiovascular Diseases  [phone]     Cardiovascular Disease Initial Evaluation  Date of Service: 04-10-23 @ 09:05    CHIEF COMPLAINT: Weakness    HISTORY OF PRESENT ILLNESS:    This is an 88 year old woman with HTN, LE DVT (dx 10/2022, was on Coumadin and ?Eliquis, no longer on AC), and OA who presented to [facility] On 4/6/2023 after being found down on the floor by pt's cousinMichelle  In the ED, pt sleepy but arousable and able to answer some questions when pt awoken from sleep.  She was noted to be COVID-19 +.  Cardiology is consulted for LV dysfunction.         Allergies  No Known Allergies    Intolerances    	    MEDICATIONS:  carvedilol 3.125 milliGRAM(s) Oral every 12 hours  enoxaparin Injectable 30 milliGRAM(s) SubCutaneous every 24 hours        acetaminophen     Tablet .. 650 milliGRAM(s) Oral every 6 hours PRN  DULoxetine 30 milliGRAM(s) Oral daily        dorzolamide 2%/timolol 0.5% Ophthalmic Solution 1 Drop(s) Both EYES <User Schedule>  latanoprost 0.005% Ophthalmic Solution 1 Drop(s) Both EYES at bedtime      PAST MEDICAL & SURGICAL HISTORY:  HTN (hypertension)      DVT, lower extremity      OA (osteoarthritis)      History of small bowel obstruction      History of right hemicolectomy      Bilateral cataracts          FAMILY HISTORY:  No pertinent family history in first degree relatives        SOCIAL HISTORY:    The patient is a nonsmoker       REVIEW OF SYSTEMS:  See HPI, otherwise complete 14 point review of systems negative      PHYSICAL EXAM:  T(C): 37.1 (04-10-23 @ 05:29), Max: 37.1 (04-09-23 @ 12:13)  HR: 75 (04-10-23 @ 05:29) (70 - 88)  BP: 134/72 (04-10-23 @ 05:29) (134/72 - 157/69)  RR: 16 (04-10-23 @ 05:29) (16 - 18)  SpO2: 98% (04-10-23 @ 05:29) (98% - 99%)  Wt(kg): --  I&O's Summary    09 Apr 2023 07:01  -  10 Apr 2023 07:00  --------------------------------------------------------  IN: 1360 mL / OUT: 400 mL / NET: 960 mL        Appearance: No Acute Distress; resting comfortably  HEENT:  Normal oral mucosa, PERRL, EOMI	  Cardiovascular: Normal S1 S2, No JVD, No murmurs/rubs/gallops  Respiratory: Normal respiratory effort; Lungs clear to auscultation bilaterally  Gastrointestinal:  Soft, Non-tender, + BS	  Skin: No rashes, No ecchymoses, No cyanosis	  Neurologic: Non-focal; no weakness  Extremities: No clubbing, cyanosis or edema  Vascular: Peripheral pulses palpable 2+ bilaterally  Psychiatry: Alert, Mood & affect appropriate    Laboratory Data:	 	    CBC Full  -  ( 10 Apr 2023 05:30 )  WBC Count : 3.35 K/uL  Hemoglobin : 9.9 g/dL  Hematocrit : 31.4 %  Platelet Count - Automated : 238 K/uL  Mean Cell Volume : 82.0 fL  Mean Cell Hemoglobin : 25.8 pg  Mean Cell Hemoglobin Concentration : 31.5 gm/dL  Auto Neutrophil # : 1.84 K/uL  Auto Lymphocyte # : 1.19 K/uL  Auto Monocyte # : 0.26 K/uL  Auto Eosinophil # : 0.04 K/uL  Auto Basophil # : 0.02 K/uL  Auto Neutrophil % : 54.9 %  Auto Lymphocyte % : 35.5 %  Auto Monocyte % : 7.8 %  Auto Eosinophil % : 1.2 %  Auto Basophil % : 0.6 %    04-10    139  |  102  |  16  ----------------------------<  82  3.5   |  25  |  0.59  04-09    138  |  103  |  17  ----------------------------<  85  3.5   |  24  |  0.61    Ca    8.6      10 Apr 2023 05:30  Ca    8.4      09 Apr 2023 05:35  Phos  3.5     04-10  Phos  3.3     04-09  Mg     2.10     04-10  Mg     2.20     04-09      Interpretation of Telemetry: Sinus	    ECG:  	Sinus; nonspecific t-wave inversions.     Assessment: 88 year old woman with HTN, LE DVT (dx 10/2022, no longer on AC), and OA presents with cardiomyopathy and supply demand ischemia in the setting of COVID-19 infection.    Plan of Care:    #Supply demand ischemia-  Secondary to sepsis and COVID-19 infection.  Ms. Acuna denies chest pain.  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  I agree with low dose Coreg.  She is euvolemic on exam off of diuretics.  I would not start an ACEi/ARB given fall risk.   Given debility and poor functional status, the risks of a coronary ischemic work up outweighs the benefits.  Repeat echo in 3 months.       52 minutes spent on total encounter; more than 50% of the visit was spent counseling and/or coordinating care by the attending physician.   	  Juan Pablo Dudley MD [facility]  Cardiovascular Diseases  [phone]

## 2023-04-10 NOTE — ADVANCED PRACTICE NURSE CONSULT - REASON FOR CONSULT
Patient seen on skin care rounds after wound care referral received for assessment of skin impairment and recommendations of topical management for BLE wounds. Patient is a 87 yo F with PMH of HTN and LE DVT presented from home after a fall. Pt was found down by her 2nd cousin who checks on her every day. Found to be COVID+ 4/6/2023. Patient reports that ler BL legs were previously swollen and that some of her BLE wounds are from the fall and some are deroofed blisters from the edema. Xray b/l tib/fib negative for cortical erosion or periosteal reaction.  Chart reviewed: WBC 3.35 , H/H 9.9/31.4, Platelets 238, hA1c 5.8, BMI 20.6, jackie 16.  Patient seen on skin care rounds after wound care referral received for assessment of skin impairment and recommendations of topical management for BLE wounds. Patient is a 89 yo F with PMH of HTN and LE DVT presented from home after a fall. Pt was found down by her 2nd cousin who checks on her every day. Found to be COVID+ 4/6/2023. Patient reports that ler BL legs were previously swollen and that some of her BLE wounds are from the fall and some are deroofed blisters from the edema. Xray b/l tib/fib negative for cortical erosion or periosteal reaction.  Chart reviewed: WBC 3.35 , H/H 9.9/31.4, Platelets 238, hA1c 5.8, BMI 20.6, jackie 16.

## 2023-04-10 NOTE — PROGRESS NOTE ADULT - SUBJECTIVE AND OBJECTIVE BOX
[facility] Division of Hospital Medicine  Lester DonDO  Pager (M-F, 8A-5P): [phone]      Patient is a 88y old  Female who presents with a chief complaint of Fall, weakness, COVID-19 (10 Apr 2023 09:04)      SUBJECTIVE / OVERNIGHT EVENTS: tele w/no events, pt seen at bedside, overall feeling better, still with cough and congestion.   ADDITIONAL REVIEW OF SYSTEMS: no cp/sob     MEDICATIONS  (STANDING):  carvedilol 3.125 milliGRAM(s) Oral every 12 hours  dorzolamide 2%/timolol 0.5% Ophthalmic Solution 1 Drop(s) Both EYES <User Schedule>  DULoxetine 30 milliGRAM(s) Oral daily  enoxaparin Injectable 30 milliGRAM(s) SubCutaneous every 24 hours  latanoprost 0.005% Ophthalmic Solution 1 Drop(s) Both EYES at bedtime    MEDICATIONS  (PRN):  acetaminophen     Tablet .. 650 milliGRAM(s) Oral every 6 hours PRN Temp greater or equal to 38C (100.4F), Mild Pain (1 - 3), Moderate Pain (4 - 6), Severe Pain (7 - 10)      CAPILLARY BLOOD GLUCOSE        I&O's Summary    09 Apr 2023 07:01  -  10 Apr 2023 07:00  --------------------------------------------------------  IN: 1360 mL / OUT: 400 mL / NET: 960 mL    10 Apr 2023 07:01  -  10 Apr 2023 12:59  --------------------------------------------------------  IN: 480 mL / OUT: 200 mL / NET: 280 mL        PHYSICAL EXAM:  Vital Signs Last 24 Hrs  T(C): 37 (10 Apr 2023 09:58), Max: 37.1 (09 Apr 2023 21:16)  T(F): 98.6 (10 Apr 2023 09:58), Max: 98.8 (10 Apr 2023 05:29)  HR: 78 (10 Apr 2023 09:58) (75 - 88)  BP: 141/76 (10 Apr 2023 09:58) (134/72 - 157/69)  BP(mean): --  RR: 18 (10 Apr 2023 09:58) (16 - 18)  SpO2: 99% (10 Apr 2023 09:58) (98% - 99%)    Parameters below as of 10 Apr 2023 09:58  Patient On (Oxygen Delivery Method): room air      CONSTITUTIONAL: NAD, well-appearing   HEENT: EOMI, MMM  RESPIRATORY: overly clear, no wheeze   CARDIOVASCULAR: Regular rate and rhythm, normal S1 and S2, no murmurs  ABDOMEN: Nontender to palpation, normoactive bowel sounds, soft   MUSKULOSKELETAL:  no clubbing or cyanosis of digits; no joint swelling or tenderness to palpation  PSYCH: calm, cooperative   NEUROLOGY: CN 2-12 are intact and symmetric; no gross sensory deficits;   SKIN: No rashes; no palpable lesions    LABS:                        9.9    3.35  )-----------( 238      ( 10 Apr 2023 05:30 )             31.4     04-10    139  |  102  |  16  ----------------------------<  82  3.5   |  25  |  0.59    Ca    8.6      10 Apr 2023 05:30  Phos  3.5     04-10  Mg     2.10     04-10                  RADIOLOGY & ADDITIONAL TESTS:      COORDINATION OF CARE:  Care Discussed with Consultants/Other Providers [Y/N]: Y  Prior or Outpatient Records Reviewed [Y/N]: Y   [facility] Division of Hospital Medicine  Lester DonDO  Pager (M-F, 8A-5P): [phone]      Patient is a 88y old  Female who presents with a chief complaint of Fall, weakness, COVID-19 (10 Apr 2023 09:04)      SUBJECTIVE / OVERNIGHT EVENTS: tele w/no events, pt seen at bedside, overall feeling better, still with cough and congestion.   ADDITIONAL REVIEW OF SYSTEMS: no cp/sob     MEDICATIONS  (STANDING):  carvedilol 3.125 milliGRAM(s) Oral every 12 hours  dorzolamide 2%/timolol 0.5% Ophthalmic Solution 1 Drop(s) Both EYES <User Schedule>  DULoxetine 30 milliGRAM(s) Oral daily  enoxaparin Injectable 30 milliGRAM(s) SubCutaneous every 24 hours  latanoprost 0.005% Ophthalmic Solution 1 Drop(s) Both EYES at bedtime    MEDICATIONS  (PRN):  acetaminophen     Tablet .. 650 milliGRAM(s) Oral every 6 hours PRN Temp greater or equal to 38C (100.4F), Mild Pain (1 - 3), Moderate Pain (4 - 6), Severe Pain (7 - 10)      CAPILLARY BLOOD GLUCOSE        I&O's Summary    09 Apr 2023 07:01  -  10 Apr 2023 07:00  --------------------------------------------------------  IN: 1360 mL / OUT: 400 mL / NET: 960 mL    10 Apr 2023 07:01  -  10 Apr 2023 12:59  --------------------------------------------------------  IN: 480 mL / OUT: 200 mL / NET: 280 mL        PHYSICAL EXAM:  Vital Signs Last 24 Hrs  T(C): 37 (10 Apr 2023 09:58), Max: 37.1 (09 Apr 2023 21:16)  T(F): 98.6 (10 Apr 2023 09:58), Max: 98.8 (10 Apr 2023 05:29)  HR: 78 (10 Apr 2023 09:58) (75 - 88)  BP: 141/76 (10 Apr 2023 09:58) (134/72 - 157/69)  BP(mean): --  RR: 18 (10 Apr 2023 09:58) (16 - 18)  SpO2: 99% (10 Apr 2023 09:58) (98% - 99%)    Parameters below as of 10 Apr 2023 09:58  Patient On (Oxygen Delivery Method): room air      CONSTITUTIONAL: NAD, well-appearing   HEENT: EOMI, MMM  RESPIRATORY: overly clear, no wheeze   CARDIOVASCULAR: Regular rate and rhythm, normal S1 and S2, no murmurs  ABDOMEN: Nontender to palpation, normoactive bowel sounds, soft   MUSKULOSKELETAL:  no clubbing or cyanosis of digits; no joint swelling or tenderness to palpation  PSYCH: calm, cooperative   NEUROLOGY: CN 2-12 are intact and symmetric; no gross sensory deficits;   SKIN: No rashes; no palpable lesions    LABS:                        9.9    3.35  )-----------( 238      ( 10 Apr 2023 05:30 )             31.4     04-10    139  |  102  |  16  ----------------------------<  82  3.5   |  25  |  0.59    Ca    8.6      10 Apr 2023 05:30  Phos  3.5     04-10  Mg     2.10     04-10                  RADIOLOGY & ADDITIONAL TESTS:      COORDINATION OF CARE:  Care Discussed with Consultants/Other Providers [Y/N]: Y  Prior or Outpatient Records Reviewed [Y/N]: Y   [facility] Division of Hospital Medicine  Lester DonDO  Pager (M-F, 8A-5P): [phone]      Patient is a 88y old  Female who presents with a chief complaint of Fall, weakness, COVID-19 (10 Apr 2023 09:04)      SUBJECTIVE / OVERNIGHT EVENTS: tele w/no events, pt seen at bedside, overall feeling better, still with cough and congestion.   ADDITIONAL REVIEW OF SYSTEMS: no cp/sob     MEDICATIONS  (STANDING):  carvedilol 3.125 milliGRAM(s) Oral every 12 hours  dorzolamide 2%/timolol 0.5% Ophthalmic Solution 1 Drop(s) Both EYES <User Schedule>  DULoxetine 30 milliGRAM(s) Oral daily  enoxaparin Injectable 30 milliGRAM(s) SubCutaneous every 24 hours  latanoprost 0.005% Ophthalmic Solution 1 Drop(s) Both EYES at bedtime    MEDICATIONS  (PRN):  acetaminophen     Tablet .. 650 milliGRAM(s) Oral every 6 hours PRN Temp greater or equal to 38C (100.4F), Mild Pain (1 - 3), Moderate Pain (4 - 6), Severe Pain (7 - 10)      CAPILLARY BLOOD GLUCOSE        I&O's Summary    09 Apr 2023 07:01  -  10 Apr 2023 07:00  --------------------------------------------------------  IN: 1360 mL / OUT: 400 mL / NET: 960 mL    10 Apr 2023 07:01  -  10 Apr 2023 12:59  --------------------------------------------------------  IN: 480 mL / OUT: 200 mL / NET: 280 mL        PHYSICAL EXAM:  Vital Signs Last 24 Hrs  T(C): 37 (10 Apr 2023 09:58), Max: 37.1 (09 Apr 2023 21:16)  T(F): 98.6 (10 Apr 2023 09:58), Max: 98.8 (10 Apr 2023 05:29)  HR: 78 (10 Apr 2023 09:58) (75 - 88)  BP: 141/76 (10 Apr 2023 09:58) (134/72 - 157/69)  BP(mean): --  RR: 18 (10 Apr 2023 09:58) (16 - 18)  SpO2: 99% (10 Apr 2023 09:58) (98% - 99%)    Parameters below as of 10 Apr 2023 09:58  Patient On (Oxygen Delivery Method): room air      CONSTITUTIONAL: NAD, well-appearing   HEENT: EOMI, MMM  RESPIRATORY: overly clear, no wheeze   CARDIOVASCULAR: Regular rate and rhythm, normal S1 and S2, no murmurs  ABDOMEN: Nontender to palpation, normoactive bowel sounds, soft   MUSKULOSKELETAL:  no clubbing or cyanosis of digits; no joint swelling or tenderness to palpation  PSYCH: calm, cooperative   NEUROLOGY: CN 2-12 are intact and symmetric; no gross sensory deficits;   SKIN: No rashes; no palpable lesions    LABS:                        9.9    3.35  )-----------( 238      ( 10 Apr 2023 05:30 )             31.4     04-10    139  |  102  |  16  ----------------------------<  82  3.5   |  25  |  0.59    Ca    8.6      10 Apr 2023 05:30  Phos  3.5     04-10  Mg     2.10     04-10                  RADIOLOGY & ADDITIONAL TESTS:      COORDINATION OF CARE:  Care Discussed with Consultants/Other Providers [Y/N]: Y  Prior or Outpatient Records Reviewed [Y/N]: Y

## 2023-04-10 NOTE — ADVANCED PRACTICE NURSE CONSULT - ASSESSMENT
Patient is a&ox4, OOB independently at baseline. female external urine management system in place, continent of stool.     Vascular: Bilateral lower extremities with dry wrinkled skin, hemosiderin staining. Thickened-yellow hyperpigmented overgrown toenails. Scattered stable scabs and areas of hypopigmentation (areas of previous wounds, now healed) to BLE, stable scab to right 2nd toe, dorsal aspect.  No temperature changes noted. Capillary refill <3 seconds. Palpable DP pulses. Denies numbness, tingling.    RLE with multiple wounds at various stages of healing 2/2 fall, deroofed blisters:    R lateral lower leg - 1cmx0.5cmx0.2cm, 100% pink moist dermis, scant serosanguinous drainage, no odor.  Right lower posterior leg - 1.5cmx2.5cmx0.2cm, 100% pink friable dermis, small serosanguinous drianage, no odor.   Right anterior lower leg - 1.0scc8wcq6.3cm, dried serous crust, some removed with gentle cleansing exposing 20% pale pink dermis, unable to remove further due to patient request, small amount of purulent drainage expressed from under crust, no odor.     Periwounds with no erythema, no increased warmth, no edema, no induration, no fluctuance no signs or symptoms of overt skin infection. Goals of care: atraumatic dressing, provide antimicrobial support, contain small drainage, monitor for tissue type changes.       Patient is a&ox4, OOB independently at baseline. female external urine management system in place, continent of stool.     Vascular: Bilateral lower extremities with dry wrinkled skin, hemosiderin staining. Thickened-yellow hyperpigmented overgrown toenails. Scattered stable scabs and areas of hypopigmentation (areas of previous wounds, now healed) to BLE, stable scab to right 2nd toe, dorsal aspect.  No temperature changes noted. Capillary refill <3 seconds. Palpable DP pulses. Denies numbness, tingling.    RLE with multiple wounds at various stages of healing 2/2 fall, deroofed blisters:    R lateral lower leg - 1cmx0.5cmx0.2cm, 100% pink moist dermis, scant serosanguinous drainage, no odor.  Right lower posterior leg - 1.5cmx2.5cmx0.2cm, 100% pink friable dermis, small serosanguinous drianage, no odor.   Right anterior lower leg - 1.7afq8itc7.3cm, dried serous crust, some removed with gentle cleansing exposing 20% pale pink dermis, unable to remove further due to patient request, small amount of purulent drainage expressed from under crust, no odor.     Periwounds with no erythema, no increased warmth, no edema, no induration, no fluctuance no signs or symptoms of overt skin infection. Goals of care: atraumatic dressing, provide antimicrobial support, contain small drainage, monitor for tissue type changes.       Patient is a&ox4, OOB independently at baseline. female external urine management system in place, continent of stool.     Vascular: Bilateral lower extremities with dry wrinkled skin, hemosiderin staining. Thickened-yellow hyperpigmented overgrown toenails. Scattered stable scabs and areas of hypopigmentation (areas of previous wounds, now healed) to BLE, stable scab to right 2nd toe, dorsal aspect.  No temperature changes noted. Capillary refill <3 seconds. Palpable DP pulses. Denies numbness, tingling.    RLE with multiple wounds at various stages of healing 2/2 fall, deroofed blisters:    R lateral lower leg - 1cmx0.5cmx0.2cm, 100% pink moist dermis, scant serosanguinous drainage, no odor.  Right lower posterior leg - 1.5cmx2.5cmx0.2cm, 100% pink friable dermis, small serosanguinous drianage, no odor.   Right anterior lower leg - 1.4xpf3pee6.3cm, dried serous crust, some removed with gentle cleansing exposing 20% pale pink dermis, unable to remove further due to patient request, small amount of purulent drainage expressed from under crust, no odor.     Periwounds with no erythema, no increased warmth, no edema, no induration, no fluctuance no signs or symptoms of overt skin infection. Goals of care: atraumatic dressing, provide antimicrobial support, contain small drainage, monitor for tissue type changes.       Patient is a&ox4, OOB independently at baseline. Female external urine management system in place, incontinent of urine and stool. Skin warm, dry with increased moisture in intertriginous folds, adequate skin turgor, at risk for incontinence associated dermatitis.    Vascular: Bilateral lower extremities with dry wrinkled skin, hemosiderin staining. Thickened-yellow hyperpigmented overgrown toenails. Scattered stable scabs and areas of hypopigmentation (areas of previous wounds, now healed) to BLE, stable scab to right 2nd toe, dorsal aspect.  No temperature changes noted. Capillary refill <3 seconds. Palpable DP pulses. Denies numbness, tingling.    RLE with multiple wounds at various stages of healing 2/2 fall, deroofed blisters:    R lateral lower leg - 1cmx0.5cmx0.2cm, 100% pink moist dermis, scant serosanguinous drainage, no odor.  Right lower posterior leg - 1.5cmx2.5cmx0.2cm, 100% pink friable dermis, small serosanguinous drianage, no odor.   Right anterior lower leg - 1.7wbx8iuu3.3cm, dried serous crust, some removed with gentle cleansing exposing 20% pale pink dermis, unable to remove further due to patient request, small amount of purulent drainage expressed from under crust, no odor.     Periwounds with no erythema, no increased warmth, no edema, no induration, no fluctuance no signs or symptoms of overt skin infection. Goals of care: atraumatic dressing, provide antimicrobial support, contain small drainage, monitor for tissue type changes.       Patient is a&ox4, OOB independently at baseline. Female external urine management system in place, incontinent of urine and stool. Skin warm, dry with increased moisture in intertriginous folds, adequate skin turgor, at risk for incontinence associated dermatitis.    Vascular: Bilateral lower extremities with dry wrinkled skin, hemosiderin staining. Thickened-yellow hyperpigmented overgrown toenails. Scattered stable scabs and areas of hypopigmentation (areas of previous wounds, now healed) to BLE, stable scab to right 2nd toe, dorsal aspect.  No temperature changes noted. Capillary refill <3 seconds. Palpable DP pulses. Denies numbness, tingling.    RLE with multiple wounds at various stages of healing 2/2 fall, deroofed blisters:    R lateral lower leg - 1cmx0.5cmx0.2cm, 100% pink moist dermis, scant serosanguinous drainage, no odor.  Right lower posterior leg - 1.5cmx2.5cmx0.2cm, 100% pink friable dermis, small serosanguinous drianage, no odor.   Right anterior lower leg - 1.5fis0ief8.3cm, dried serous crust, some removed with gentle cleansing exposing 20% pale pink dermis, unable to remove further due to patient request, small amount of purulent drainage expressed from under crust, no odor.     Periwounds with no erythema, no increased warmth, no edema, no induration, no fluctuance no signs or symptoms of overt skin infection. Goals of care: atraumatic dressing, provide antimicrobial support, contain small drainage, monitor for tissue type changes.       Patient is a&ox4, OOB independently at baseline. Female external urine management system in place, incontinent of urine and stool. Skin warm, dry with increased moisture in intertriginous folds, adequate skin turgor, at risk for incontinence associated dermatitis.    Vascular: Bilateral lower extremities with dry wrinkled skin, hemosiderin staining. Thickened-yellow hyperpigmented overgrown toenails. Scattered stable scabs and areas of hypopigmentation (areas of previous wounds, now healed) to BLE, stable scab to right 2nd toe, dorsal aspect.  No temperature changes noted. Capillary refill <3 seconds. Palpable DP pulses. Denies numbness, tingling.    RLE with multiple wounds at various stages of healing 2/2 fall, deroofed blisters:    R lateral lower leg - 1cmx0.5cmx0.2cm, 100% pink moist dermis, scant serosanguinous drainage, no odor.  Right lower posterior leg - 1.5cmx2.5cmx0.2cm, 100% pink friable dermis, small serosanguinous drianage, no odor.   Right anterior lower leg - 1.5ytw4icn4.3cm, dried serous crust, some removed with gentle cleansing exposing 20% pale pink dermis, unable to remove further due to patient request, small amount of purulent drainage expressed from under crust, no odor.     Periwounds with no erythema, no increased warmth, no edema, no induration, no fluctuance no signs or symptoms of overt skin infection. Goals of care: atraumatic dressing, provide antimicrobial support, contain small drainage, monitor for tissue type changes.       Patient is a&ox4, OOB independently at baseline. Female external urine management system in place, incontinent of urine and stool. Skin warm, dry with increased moisture in intertriginous folds, adequate skin turgor, at risk for incontinence associated dermatitis.    Vascular: Bilateral lower extremities with dry wrinkled skin, hemosiderin staining. Thickened-yellow hyperpigmented overgrown toenails. Scattered stable scabs and areas of hypopigmentation (areas of previous wounds, now healed) to BLE, stable scab to right 2nd toe, dorsal aspect.  No temperature changes noted. Capillary refill <3 seconds. Palpable DP pulses. Denies numbness, tingling.    RLE with multiple wounds at various stages of healing 2/2 fall, deroofed blisters:    R lateral lower leg - 1cmx0.5cmx0.2cm, 100% pink moist dermis, scant serosanguinous drainage, no odor.  Right lower posterior leg - 1.5cmx2.5cmx0.2cm, 100% pink friable dermis, small serosanguinous drianage, no odor.   Right anterior lower leg - 1.6uac8qif3.3cm, dried serous crust, some removed with gentle cleansing exposing 20% pale pink dermis, unable to remove further due to patient request & discomfort, small amount of purulent drainage expressed from under crust, no odor.     Periwounds with no erythema, no increased warmth, no edema, no induration, no fluctuance no signs or symptoms of overt skin infection. Goals of care: atraumatic dressing, provide antimicrobial support, promote autolytic debridement, contain small drainage, monitor for tissue type changes.       Patient is a&ox4, OOB independently at baseline. Female external urine management system in place, incontinent of urine and stool. Skin warm, dry with increased moisture in intertriginous folds, adequate skin turgor, at risk for incontinence associated dermatitis.    Vascular: Bilateral lower extremities with dry wrinkled skin, hemosiderin staining. Thickened-yellow hyperpigmented overgrown toenails. Scattered stable scabs and areas of hypopigmentation (areas of previous wounds, now healed) to BLE, stable scab to right 2nd toe, dorsal aspect.  No temperature changes noted. Capillary refill <3 seconds. Palpable DP pulses. Denies numbness, tingling.    RLE with multiple wounds at various stages of healing 2/2 fall, deroofed blisters:    R lateral lower leg - 1cmx0.5cmx0.2cm, 100% pink moist dermis, scant serosanguinous drainage, no odor.  Right lower posterior leg - 1.5cmx2.5cmx0.2cm, 100% pink friable dermis, small serosanguinous drianage, no odor.   Right anterior lower leg - 1.2tcv6qvq5.3cm, dried serous crust, some removed with gentle cleansing exposing 20% pale pink dermis, unable to remove further due to patient request & discomfort, small amount of purulent drainage expressed from under crust, no odor.     Periwounds with no erythema, no increased warmth, no edema, no induration, no fluctuance no signs or symptoms of overt skin infection. Goals of care: atraumatic dressing, provide antimicrobial support, promote autolytic debridement, contain small drainage, monitor for tissue type changes.       Patient is a&ox4, OOB independently at baseline. Female external urine management system in place, incontinent of urine and stool. Skin warm, dry with increased moisture in intertriginous folds, adequate skin turgor, at risk for incontinence associated dermatitis.    Vascular: Bilateral lower extremities with dry wrinkled skin, hemosiderin staining. Thickened-yellow hyperpigmented overgrown toenails. Scattered stable scabs and areas of hypopigmentation (areas of previous wounds, now healed) to BLE, stable scab to right 2nd toe, dorsal aspect.  No temperature changes noted. Capillary refill <3 seconds. Palpable DP pulses. Denies numbness, tingling.    RLE with multiple wounds at various stages of healing 2/2 fall, deroofed blisters:    R lateral lower leg - 1cmx0.5cmx0.2cm, 100% pink moist dermis, scant serosanguinous drainage, no odor.  Right lower posterior leg - 1.5cmx2.5cmx0.2cm, 100% pink friable dermis, small serosanguinous drianage, no odor.   Right anterior lower leg - 1.7bgx6uue8.3cm, dried serous crust, some removed with gentle cleansing exposing 20% pale pink dermis, unable to remove further due to patient request & discomfort, small amount of purulent drainage expressed from under crust, no odor.     Periwounds with no erythema, no increased warmth, no edema, no induration, no fluctuance no signs or symptoms of overt skin infection. Goals of care: atraumatic dressing, provide antimicrobial support, promote autolytic debridement, contain small drainage, monitor for tissue type changes.

## 2023-04-10 NOTE — PROGRESS NOTE ADULT - PROBLEM SELECTOR PLAN 11
- DVT ppx: Hgb stable, started Lovenox    PT recommends rehab - DVT ppx: Hgb stable, started Lovenox    PT recommends rehab  Updated pts nephew, Gale, over the phone on 4/10. Also updated pts sister, Fiordaliza Carl ([phone]) per pt request. - DVT ppx: Hgb stable, started Lovenox    PT recommends rehab  Updated pts nephew, Gale, over the phone on 4/10. Also updated pts sister, Fiordaliza Carl ([phone]) per pt request. - DVT ppx: Hgb stable, started Lovenox    PT recommends rehab  Updated pts nephew, Gale, over the phone on 4/10. Also updated pts sister, Fiordaliza Carl ([phone]) per pt request.

## 2023-04-11 ENCOUNTER — TRANSCRIPTION ENCOUNTER (OUTPATIENT)
Age: 88
End: 2023-04-11

## 2023-04-11 VITALS
DIASTOLIC BLOOD PRESSURE: 78 MMHG | HEART RATE: 78 BPM | SYSTOLIC BLOOD PRESSURE: 154 MMHG | OXYGEN SATURATION: 100 % | RESPIRATION RATE: 17 BRPM | TEMPERATURE: 98 F

## 2023-04-11 LAB
ANION GAP SERPL CALC-SCNC: 9 MMOL/L — SIGNIFICANT CHANGE UP (ref 7–14)
BASOPHILS # BLD AUTO: 0.01 K/UL — SIGNIFICANT CHANGE UP (ref 0–0.2)
BASOPHILS NFR BLD AUTO: 0.3 % — SIGNIFICANT CHANGE UP (ref 0–2)
BUN SERPL-MCNC: 12 MG/DL — SIGNIFICANT CHANGE UP (ref 7–23)
CALCIUM SERPL-MCNC: 8.7 MG/DL — SIGNIFICANT CHANGE UP (ref 8.4–10.5)
CHLORIDE SERPL-SCNC: 104 MMOL/L — SIGNIFICANT CHANGE UP (ref 98–107)
CO2 SERPL-SCNC: 25 MMOL/L — SIGNIFICANT CHANGE UP (ref 22–31)
CREAT SERPL-MCNC: 0.58 MG/DL — SIGNIFICANT CHANGE UP (ref 0.5–1.3)
EGFR: 87 ML/MIN/1.73M2 — SIGNIFICANT CHANGE UP
EOSINOPHIL # BLD AUTO: 0.07 K/UL — SIGNIFICANT CHANGE UP (ref 0–0.5)
EOSINOPHIL NFR BLD AUTO: 1.8 % — SIGNIFICANT CHANGE UP (ref 0–6)
GLUCOSE SERPL-MCNC: 91 MG/DL — SIGNIFICANT CHANGE UP (ref 70–99)
HCT VFR BLD CALC: 34.5 % — SIGNIFICANT CHANGE UP (ref 34.5–45)
HGB BLD-MCNC: 10.9 G/DL — LOW (ref 11.5–15.5)
IANC: 2.27 K/UL — SIGNIFICANT CHANGE UP (ref 1.8–7.4)
IMM GRANULOCYTES NFR BLD AUTO: 0.3 % — SIGNIFICANT CHANGE UP (ref 0–0.9)
LYMPHOCYTES # BLD AUTO: 1.23 K/UL — SIGNIFICANT CHANGE UP (ref 1–3.3)
LYMPHOCYTES # BLD AUTO: 31.5 % — SIGNIFICANT CHANGE UP (ref 13–44)
MAGNESIUM SERPL-MCNC: 2.1 MG/DL — SIGNIFICANT CHANGE UP (ref 1.6–2.6)
MCHC RBC-ENTMCNC: 26.5 PG — LOW (ref 27–34)
MCHC RBC-ENTMCNC: 31.6 GM/DL — LOW (ref 32–36)
MCV RBC AUTO: 83.7 FL — SIGNIFICANT CHANGE UP (ref 80–100)
MONOCYTES # BLD AUTO: 0.32 K/UL — SIGNIFICANT CHANGE UP (ref 0–0.9)
MONOCYTES NFR BLD AUTO: 8.2 % — SIGNIFICANT CHANGE UP (ref 2–14)
NEUTROPHILS # BLD AUTO: 2.27 K/UL — SIGNIFICANT CHANGE UP (ref 1.8–7.4)
NEUTROPHILS NFR BLD AUTO: 57.9 % — SIGNIFICANT CHANGE UP (ref 43–77)
NRBC # BLD: 0 /100 WBCS — SIGNIFICANT CHANGE UP (ref 0–0)
NRBC # FLD: 0 K/UL — SIGNIFICANT CHANGE UP (ref 0–0)
PHOSPHATE SERPL-MCNC: 3.4 MG/DL — SIGNIFICANT CHANGE UP (ref 2.5–4.5)
PLATELET # BLD AUTO: 260 K/UL — SIGNIFICANT CHANGE UP (ref 150–400)
POTASSIUM SERPL-MCNC: 4.1 MMOL/L — SIGNIFICANT CHANGE UP (ref 3.5–5.3)
POTASSIUM SERPL-SCNC: 4.1 MMOL/L — SIGNIFICANT CHANGE UP (ref 3.5–5.3)
RBC # BLD: 4.12 M/UL — SIGNIFICANT CHANGE UP (ref 3.8–5.2)
RBC # FLD: 15.3 % — HIGH (ref 10.3–14.5)
SODIUM SERPL-SCNC: 138 MMOL/L — SIGNIFICANT CHANGE UP (ref 135–145)
WBC # BLD: 3.91 K/UL — SIGNIFICANT CHANGE UP (ref 3.8–10.5)
WBC # FLD AUTO: 3.91 K/UL — SIGNIFICANT CHANGE UP (ref 3.8–10.5)

## 2023-04-11 PROCEDURE — 99239 HOSP IP/OBS DSCHRG MGMT >30: CPT

## 2023-04-11 RX ORDER — CARVEDILOL PHOSPHATE 80 MG/1
1 CAPSULE, EXTENDED RELEASE ORAL
Qty: 60 | Refills: 0
Start: 2023-04-11 | End: 2023-05-10

## 2023-04-11 RX ORDER — CARVEDILOL PHOSPHATE 80 MG/1
1 CAPSULE, EXTENDED RELEASE ORAL
Qty: 0 | Refills: 0 | DISCHARGE
Start: 2023-04-11

## 2023-04-11 RX ADMIN — CARVEDILOL PHOSPHATE 3.12 MILLIGRAM(S): 80 CAPSULE, EXTENDED RELEASE ORAL at 04:55

## 2023-04-11 RX ADMIN — DULOXETINE HYDROCHLORIDE 30 MILLIGRAM(S): 30 CAPSULE, DELAYED RELEASE ORAL at 10:45

## 2023-04-11 RX ADMIN — DORZOLAMIDE HYDROCHLORIDE TIMOLOL MALEATE 1 DROP(S): 20; 5 SOLUTION/ DROPS OPHTHALMIC at 10:44

## 2023-04-11 NOTE — PROGRESS NOTE ADULT - ASSESSMENT
87 yo woman with history of HTN, LE DVT (dx 10/2022, was on Coumadin and ?Eliquis, no longer on AC), osteoarthritis, b/l cataracts, SBO (7/2021, conservatively managed), and benign colonic mass s/p R hemicolectomy (1998) presents from home after being found down admitted with sepsis 2/2 COVID-19.

## 2023-04-11 NOTE — PROGRESS NOTE ADULT - SUBJECTIVE AND OBJECTIVE BOX
Cardiovascular Disease Progress Note  Date of Service: 04-11-23 @ 07:56    Overnight events: No acute events overnight.    Patient denies chest pain or SOB.   Otherwise review of systems negative    Objective Findings:  T(C): 37.1 (04-11-23 @ 05:10), Max: 37.1 (04-10-23 @ 22:19)  HR: 75 (04-11-23 @ 05:10) (75 - 78)  BP: 146/72 (04-11-23 @ 05:10) (141/76 - 156/78)  RR: 16 (04-11-23 @ 05:10) (16 - 18)  SpO2: 98% (04-11-23 @ 05:10) (98% - 100%)  Wt(kg): --  Daily     Daily       Physical Exam:  Gen: NAD; Patient resting comfortably  HEENT: EOMI, Normocephalic/ atraumatic  CV: RRR, normal S1 + S2, no m/r/g  Lungs:  Normal respiratory effort; fair air entry to auscultation bilaterally  Abd: soft, non-tender; bowel sounds present  Ext: No edema; warm and well perfused    Telemetry: Sinus    Laboratory Data:                        10.9   3.91  )-----------( 260      ( 11 Apr 2023 06:10 )             34.5     04-11    138  |  104  |  12  ----------------------------<  91  4.1   |  25  |  0.58    Ca    8.7      11 Apr 2023 06:10  Phos  3.4     04-11  Mg     2.10     04-11                Inpatient Medications:  MEDICATIONS  (STANDING):  carvedilol 3.125 milliGRAM(s) Oral every 12 hours  dorzolamide 2%/timolol 0.5% Ophthalmic Solution 1 Drop(s) Both EYES <User Schedule>  DULoxetine 30 milliGRAM(s) Oral daily  enoxaparin Injectable 30 milliGRAM(s) SubCutaneous every 24 hours  latanoprost 0.005% Ophthalmic Solution 1 Drop(s) Both EYES at bedtime      Assessment: 88 year old woman with HTN, LE DVT (dx 10/2022, no longer on AC), and OA presents with cardiomyopathy and supply demand ischemia in the setting of COVID-19 infection.    Plan of Care:    #Supply demand ischemia-  Secondary to sepsis and COVID-19 infection.  Ms. Acuna denies chest pain.  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  I agree with low dose Coreg.  She is euvolemic on exam off of diuretics.  I would not start an ACEi/ARB given fall risk.   Given debility and poor functional status, the risks of a coronary ischemic work up outweighs the benefits.    Over 35 minutes spent on total encounter; more than 50% of the visit was spent counseling and/or coordinating care by the attending physician.      Juan Pablo Dudley MD [facility]  Cardiovascular Disease  [phone] Cardiovascular Disease Progress Note  Date of Service: 04-11-23 @ 07:56    Overnight events: No acute events overnight.    Patient denies chest pain or SOB.   Otherwise review of systems negative    Objective Findings:  T(C): 37.1 (04-11-23 @ 05:10), Max: 37.1 (04-10-23 @ 22:19)  HR: 75 (04-11-23 @ 05:10) (75 - 78)  BP: 146/72 (04-11-23 @ 05:10) (141/76 - 156/78)  RR: 16 (04-11-23 @ 05:10) (16 - 18)  SpO2: 98% (04-11-23 @ 05:10) (98% - 100%)  Wt(kg): --  Daily     Daily       Physical Exam:  Gen: NAD; Patient resting comfortably  HEENT: EOMI, Normocephalic/ atraumatic  CV: RRR, normal S1 + S2, no m/r/g  Lungs:  Normal respiratory effort; fair air entry to auscultation bilaterally  Abd: soft, non-tender; bowel sounds present  Ext: No edema; warm and well perfused    Telemetry: Sinus    Laboratory Data:                        10.9   3.91  )-----------( 260      ( 11 Apr 2023 06:10 )             34.5     04-11    138  |  104  |  12  ----------------------------<  91  4.1   |  25  |  0.58    Ca    8.7      11 Apr 2023 06:10  Phos  3.4     04-11  Mg     2.10     04-11                Inpatient Medications:  MEDICATIONS  (STANDING):  carvedilol 3.125 milliGRAM(s) Oral every 12 hours  dorzolamide 2%/timolol 0.5% Ophthalmic Solution 1 Drop(s) Both EYES <User Schedule>  DULoxetine 30 milliGRAM(s) Oral daily  enoxaparin Injectable 30 milliGRAM(s) SubCutaneous every 24 hours  latanoprost 0.005% Ophthalmic Solution 1 Drop(s) Both EYES at bedtime      Assessment: 88 year old woman with HTN, LE DVT (dx 10/2022, no longer on AC), and OA presents with cardiomyopathy and supply demand ischemia in the setting of COVID-19 infection.    Plan of Care:    #Supply demand ischemia-  Secondary to sepsis and COVID-19 infection.  Ms. Acuna denies chest pain.  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  I agree with low dose Coreg.  She is euvolemic on exam off of diuretics.  I would not start an ACEi/ARB given fall risk.   Given debility and poor functional status, the risks of a coronary ischemic work up outweighs the benefits.    Over 35 minutes spent on total encounter; more than 50% of the visit was spent counseling and/or coordinating care by the attending physician.      Juan Pablo Dudley MD [facility]  Cardiovascular Disease  [phone] Cardiovascular Disease Progress Note  Date of Service: 04-11-23 @ 07:56    Overnight events: No acute events overnight.    Patient denies chest pain or SOB.   Otherwise review of systems negative    Objective Findings:  T(C): 37.1 (04-11-23 @ 05:10), Max: 37.1 (04-10-23 @ 22:19)  HR: 75 (04-11-23 @ 05:10) (75 - 78)  BP: 146/72 (04-11-23 @ 05:10) (141/76 - 156/78)  RR: 16 (04-11-23 @ 05:10) (16 - 18)  SpO2: 98% (04-11-23 @ 05:10) (98% - 100%)  Wt(kg): --  Daily     Daily       Physical Exam:  Gen: NAD; Patient resting comfortably  HEENT: EOMI, Normocephalic/ atraumatic  CV: RRR, normal S1 + S2, no m/r/g  Lungs:  Normal respiratory effort; fair air entry to auscultation bilaterally  Abd: soft, non-tender; bowel sounds present  Ext: No edema; warm and well perfused    Telemetry: Sinus    Laboratory Data:                        10.9   3.91  )-----------( 260      ( 11 Apr 2023 06:10 )             34.5     04-11    138  |  104  |  12  ----------------------------<  91  4.1   |  25  |  0.58    Ca    8.7      11 Apr 2023 06:10  Phos  3.4     04-11  Mg     2.10     04-11                Inpatient Medications:  MEDICATIONS  (STANDING):  carvedilol 3.125 milliGRAM(s) Oral every 12 hours  dorzolamide 2%/timolol 0.5% Ophthalmic Solution 1 Drop(s) Both EYES <User Schedule>  DULoxetine 30 milliGRAM(s) Oral daily  enoxaparin Injectable 30 milliGRAM(s) SubCutaneous every 24 hours  latanoprost 0.005% Ophthalmic Solution 1 Drop(s) Both EYES at bedtime      Assessment: 88 year old woman with HTN, LE DVT (dx 10/2022, no longer on AC), and OA presents with cardiomyopathy and supply demand ischemia in the setting of COVID-19 infection.    Plan of Care:    #Supply demand ischemia-  Secondary to sepsis and COVID-19 infection.  Ms. Acuna denies chest pain.  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  I agree with low dose Coreg.  She is euvolemic on exam off of diuretics.  I would not start an ACEi/ARB given fall risk.   Given debility and poor functional status, the risks of a coronary ischemic work up outweighs the benefits.    Over 35 minutes spent on total encounter; more than 50% of the visit was spent counseling and/or coordinating care by the attending physician.      Juan Pablo Dudley MD [facility]  Cardiovascular Disease  [phone]

## 2023-04-11 NOTE — PROGRESS NOTE ADULT - PROBLEM SELECTOR PLAN 2
Pt found to be positive for COVID-19.   - CXR with clear lungs. Pt currently maintaining SpO2 100% RA.  - c/w 3-day course of Remdesivir given pt's advanced age, which places pt at higher risk for progression to severe COVID-19  - Pt does not meet criteria for Decadron at this time, as pt currently not requiring supplemental O2  - Trend inflammatory markers, including CRP, ferritin, procalcitonin, and D-dimer  - Hold pharmacologic ppx with Lovenox SQ for now given pt now with acute anemia  - Supportive care with PO Tylenol PRN for fever and supplemental O2 PRN  - Airborne/contact precautions
Improved   Pt found to be positive for COVID-19.   - CXR with clear lungs. Pt currently maintaining SpO2 100% RA.  - s/p 3-day course of Remdesivir given pt's advanced age  - Pt does not meet criteria for Decadron at this time, as pt currently not requiring supplemental O2  - Airborne/contact precautions
Pt found to be positive for COVID-19.   - CXR with clear lungs. Pt currently maintaining SpO2 100% RA.  - c/w 3-day course of Remdesivir given pt's advanced age, which places pt at higher risk for progression to severe COVID-19  - Pt does not meet criteria for Decadron at this time, as pt currently not requiring supplemental O2  - Trend inflammatory markers, including CRP, ferritin, procalcitonin, and D-dimer  - Hold pharmacologic ppx with Lovenox SQ for now given pt now with acute anemia  - Supportive care with PO Tylenol PRN for fever and supplemental O2 PRN  - Airborne/contact precautions
Pt found to be positive for COVID-19.   - CXR with clear lungs. Pt currently maintaining SpO2 100% RA.  - s/p 3-day course of Remdesivir given pt's advanced age  - Pt does not meet criteria for Decadron at this time, as pt currently not requiring supplemental O2  - Trend inflammatory markers, including CRP, ferritin, procalcitonin, and D-dimer  - Supportive care with PO Tylenol PRN for fever and supplemental O2 PRN  - Airborne/contact precautions
Pt found to be positive for COVID-19.   - CXR with clear lungs. Pt currently maintaining SpO2 100% RA.  - s/p 3-day course of Remdesivir given pt's advanced age  - Pt does not meet criteria for Decadron at this time, as pt currently not requiring supplemental O2  - Trend inflammatory markers, including CRP, ferritin, procalcitonin, and D-dimer  - Supportive care with PO Tylenol PRN for fever and supplemental O2 PRN  - Airborne/contact precautions

## 2023-04-11 NOTE — PROGRESS NOTE ADULT - PROBLEM SELECTOR PLAN 8
-seen by speech and swallow  -recommends soft and bite sized diet w/ mildly thick liquids
- Unclear baseline Hgb, initial hgb of 11.7 likely lab error   - No gross bleeding   - CT C/A/P with IV contrast ordered  - iron studies consistent w/ iron deficiency   - folate and vitamin B12 wnl  - Monitor H/H, transfuse for Hgb <7
-seen by speech and swallow  -recommends soft and bite sized diet w/ mildly thick liquids
- Unclear baseline Hgb, initial hgb of 11.7 likely lab error   - No gross bleeding   - CT C/A/P with IV contrast ordered  - iron studies consistent w/ iron deficiency   - folate and vitamin B12 wnl  - Monitor H/H, transfuse for Hgb <7
- Unclear baseline Hgb, initial hgb of 11.7 likely lab error   - No gross bleeding   - CT C/A/P with IV contrast ordered  - iron studies consistent w/ iron deficiency   - folate and vitamin B12 wnl  - Monitor H/H, transfuse for Hgb <7

## 2023-04-11 NOTE — DISCHARGE NOTE PROVIDER - PROVIDER TOKENS
FREE:[LAST:[PCP],PHONE:[(   )    -],FAX:[(   )    -]],FREE:[LAST:[cardiologist],PHONE:[(   )    -],FAX:[(   )    -]]

## 2023-04-11 NOTE — PROGRESS NOTE ADULT - PROBLEM SELECTOR PLAN 10
- DVT ppx: Hgb stable, start Lovenox
- Supplement K prn
- DVT ppx: Hold pharmacologic ppx with Lovenox SQ for now given pt now with acute anemia, r/o bleed

## 2023-04-11 NOTE — DISCHARGE NOTE NURSING/CASE MANAGEMENT/SOCIAL WORK - NSDCPEFALRISK_GEN_ALL_CORE
For information on Fall & Injury Prevention, visit: [email or web address]-[email or web address] OR  [email or web address]-[email or web address]-[email or web address]-injury OR  https://www.cdc.gov/steadi/patient.html For information on Fall & Injury Prevention, visit: [email or web address]://[email or web address].[email or web address]-[email or web address]-health-[email or web address]-mobility OR  [email or web address]://[email or web address].[email or web address]/[email or web address]-tips-[email or web address]-avoid-injury OR  https://www.cdc.gov/steadi/patient.html For information on Fall & Injury Prevention, visit: [email or web address]://www.[facility]/news/fall-prevention-protects-[email or web address]-maintains-health-[email or web address]-mobility OR  https://www.[facility]/news/fall-prevention-tips-to-avoid-injury OR  https://www.cdc.gov/steadi/patient.html

## 2023-04-11 NOTE — DISCHARGE NOTE PROVIDER - ATTENDING DISCHARGE PHYSICAL EXAMINATION:
CONSTITUTIONAL: NAD, well-appearing   HEENT: EOMI, MMM  RESPIRATORY: overly clear, no wheeze   CARDIOVASCULAR: Regular rate and rhythm, normal S1 and S2, no murmurs  ABDOMEN: Nontender to palpation, normoactive bowel sounds, soft   MUSKULOSKELETAL:  no clubbing or cyanosis of digits; no joint swelling or tenderness to palpation  PSYCH: calm, cooperative   NEUROLOGY: nonfocal   SKIN: No rashes; no palpable lesions

## 2023-04-11 NOTE — DISCHARGE NOTE PROVIDER - NSDCCPTREATMENT_GEN_ALL_CORE_FT
PRINCIPAL PROCEDURE  Procedure: Echo 2D  Findings and Treatment: 1. Mitral annular calcification, otherwise normal mitral  valve. Minimal mitral regurgitation.  2. Aortic valve leaflet morphology not well visualized.  The valve is calcified. Peak transaortic valve gradient  equals 19 mm Hg, mean transaortic valve gradient equals 11  mm Hg, consistent with mild aortic stenosis. Mild aortic  regurgitation.  3. Normal left ventricular internal dimensions and wall  thicknesses.  4. Mild global left ventricular systolic dysfunction.  5. Mild diastolic dysfunction(Stage I).  6. The right ventricle is not well visualized; grossly  normal right ventricular systolic function.  7. Estimated right ventricular systolic pressure equals 44  mm Hg, assuming right atrial pressure equals 10 mm Hg,  consistent with mild pulmonary hypertension.        SECONDARY PROCEDURE  Procedure: CT of chest, abdomen, and pelvis  Findings and Treatment: IMPRESSION:  Limited noncontrast exam.  Scattered bilateral upper lung predominant peripheral groundglass   opacities, may be infectious or inflammatory etiology.  Small bilateral pleural effusions.  No evidence of intra-abdominal hematoma.  Mild nonspecific prominence of the pancreatic duct measuring 4 mm.

## 2023-04-11 NOTE — DISCHARGE NOTE NURSING/CASE MANAGEMENT/SOCIAL WORK - PATIENT PORTAL LINK FT
You can access the FollowMyHealth Patient Portal offered by [facility] by registering at the following website: [email or web address]. By joining [employer]’s FollowMyHealth portal, you will also be able to view your health information using other applications (apps) compatible with our system. You can access the FollowMyHealth Patient Portal offered by [facility] by registering at the following website: [email or web address]. By joining [employer]’s FollowMyHealth portal, you will also be able to view your health information using other applications (apps) compatible with our system. You can access the FollowMyHealth Patient Portal offered by [facility] by registering at the following website: [email or web address]. By joining [employer]’s FollowMyHealth portal, you will also be able to view your health information using other applications (apps) compatible with our system.

## 2023-04-11 NOTE — PROGRESS NOTE ADULT - PROBLEM SELECTOR PLAN 6
- TTE w/ mild global left ventricular systolic dysfunction, mild diastolic dysfunction  - no previous echo for comparison but likely new diagnosis   - Will start Coreg   - Cardiology consult
Pt with small ulcer in R distal tibia, no S/S of infection. Xray b/l tib/fib negative for cortical erosion or periosteal reaction.  - Wound care consult placed  - Monitor off antibiotics for now
Pt with small ulcer in R distal tibia, no S/S of infection. Xray b/l tib/fib negative for cortical erosion or periosteal reaction.  - Wound care consult placed  - Monitor off antibiotics for now

## 2023-04-11 NOTE — DISCHARGE NOTE PROVIDER - NSDCMRMEDTOKEN_GEN_ALL_CORE_FT
amLODIPine 5 mg oral tablet: 1 tab(s) orally once a day  apixaban 5 mg oral tablet: 1 tab(s) orally 2 times a day  dorzolamide-timolol 2%-0.5% ophthalmic solution: 1 drop(s) in each eye once a day  DULoxetine 30 mg oral delayed release capsule: 1 cap(s) orally once a day  latanoprost 0.005% ophthalmic solution: 1 drop(s) in each eye once a day (at bedtime)   carvedilol 3.125 mg oral tablet: 1 tab(s) orally every 12 hours  dorzolamide-timolol 2%-0.5% ophthalmic solution: 1 drop(s) in each eye once a day  DULoxetine 30 mg oral delayed release capsule: 1 cap(s) orally once a day  latanoprost 0.005% ophthalmic solution: 1 drop(s) in each eye once a day (at bedtime)

## 2023-04-11 NOTE — PROGRESS NOTE ADULT - REASON FOR ADMISSION
Fall, weakness, COVID-19

## 2023-04-11 NOTE — PROGRESS NOTE ADULT - PROBLEM SELECTOR PLAN 9
- Supplement K
-seen by speech and swallow  -recommends soft and bite sized diet w/ mildly thick liquids
- Supplement K prn
-seen by speech and swallow  -recommends soft and bite sized diet w/ mildly thick liquids
-seen by speech and swallow  -recommends soft and bite sized diet w/ mildly thick liquids

## 2023-04-11 NOTE — DISCHARGE NOTE PROVIDER - CARE PROVIDER_API CALL
PCP,   Phone: (   )    -  Fax: (   )    -  Follow Up Time:     cardiologist,   Phone: (   )    -  Fax: (   )    -  Follow Up Time:

## 2023-04-11 NOTE — DISCHARGE NOTE PROVIDER - HOSPITAL COURSE
89 yo woman with history of HTN, LE DVT (dx 10/2022, was on Coumadin and ?Eliquis, no longer on AC), osteoarthritis, b/l cataracts, SBO (7/2021, conservatively managed), and benign colonic mass s/p R hemicolectomy (1998) presents from home after being found down admitted with sepsis 2/2 COVID-19.       Sepsis 2/2 COVID: resolved, s/p remdesivir    Fall at home: CTH negative, orthostatics neg, TTE w/mild global LV systolic dysfunction, mild diastolic dysfunction.     Acute anemia: CT CAP neg for bleed, Hb stable     Hypotension: resolved     Elevated troponin level 2/2 supply demand ischemia d/t sepsis: seen by cards, TTE reviewed, pt started on coreg. No need for diuretic.     Ulcer of right leg: seen by wound care    87 yo woman with history of HTN, LE DVT (dx 10/2022, was on Coumadin and ?Eliquis, no longer on AC), osteoarthritis, b/l cataracts, SBO (7/2021, conservatively managed), and benign colonic mass s/p R hemicolectomy (1998) presents from home after being found down admitted with sepsis 2/2 COVID-19.       Sepsis 2/2 COVID: resolved, s/p remdesivir    Fall at home: CTH negative, orthostatics neg, TTE w/mild global LV systolic dysfunction, mild diastolic dysfunction.     Acute anemia: CT CAP neg for bleed, Hb stable     Hypotension: resolved     Elevated troponin level 2/2 supply demand ischemia d/t sepsis: seen by cards, TTE reviewed, pt started on coreg. No need for diuretic.     Ulcer of right leg: seen by wound care

## 2023-04-11 NOTE — PROGRESS NOTE ADULT - PROBLEM SELECTOR PLAN 7
-Unclear baseline Hgb, initial hgb of 11.7 likely lab error   - No gross bleeding   - CT C/A/P with IV contrast ordered  - iron studies consistent w/ iron deficiency   - check folate and vitamin B12  - Monitor H/H, transfuse for Hgb <7
Pt with small ulcer in R distal tibia, no S/S of infection. Xray b/l tib/fib negative for cortical erosion or periosteal reaction.  - Wound care consult placed  - Monitor off antibiotics for now
-Unclear baseline Hgb, but trending down. No gross bleeding   - CT C/A/P with IV contrast ordered  - Check iron studies, folate, vitamin B12  - Monitor H/H, transfuse for Hgb <7

## 2023-04-11 NOTE — PROGRESS NOTE ADULT - PROBLEM SELECTOR PLAN 1
Suspect likely sepsis present on admission due to COVID-19. CXR with clear lungs. UA negative. Pt with R leg ulcer without any S/S of infection.   - Plan as below for COVID-19  - F/u blood cxs and urine cx  - Lactate 2.9 on admission. normalized s/p IVF   - Procalcitonin mildly elevated at 0.20, possibly from COVID-19. Monitor off abx for now
resolved   Suspect likely sepsis present on admission due to COVID-19. Sepsis now resolved   - CXR with clear lungs. UA negative. Pt with R leg ulcer without any S/S of infection.   - Plan as below for COVID-19  - blood cxs and urine cx NGTD   - Lactate 2.9 on admission. normalized s/p IVF   - Procalcitonin mildly elevated at 0.20, possibly from COVID-19. Monitor off abx for now
Suspect likely sepsis present on admission due to COVID-19. Sepsis now resolved   - CXR with clear lungs. UA negative. Pt with R leg ulcer without any S/S of infection.   - Plan as below for COVID-19  - blood cxs and urine cx NGTD   - Lactate 2.9 on admission. normalized s/p IVF   - Procalcitonin mildly elevated at 0.20, possibly from COVID-19. Monitor off abx for now
Suspect likely sepsis present on admission due to COVID-19. Sepsis now resolved   - CXR with clear lungs. UA negative. Pt with R leg ulcer without any S/S of infection.   - Plan as below for COVID-19  - blood cxs and urine cx NGTD   - Lactate 2.9 on admission. normalized s/p IVF   - Procalcitonin mildly elevated at 0.20, possibly from COVID-19. Monitor off abx for now
Suspect likely sepsis present on admission due to COVID-19.   - CXR with clear lungs. UA negative. Pt with R leg ulcer without any S/S of infection.   - Plan as below for COVID-19  - blood cxs and urine cx NGTD   - Lactate 2.9 on admission. normalized s/p IVF   - Procalcitonin mildly elevated at 0.20, possibly from COVID-19. Monitor off abx for now

## 2023-04-11 NOTE — PROGRESS NOTE ADULT - PROBLEM SELECTOR PLAN 3
Pt found by her cousin down on the floor, awake and alert. Pt insists that she slid off a chair due to the chair moving as she was trying to sit down due to weakness, especially in her legs. No head strike or LOC/syncope.   - CTH noncontrast negative for acute findings.  - orthostatics negative   - TTE w/ mild global left ventricular systolic dysfunction, mild diastolic dysfunction  - CT C/A/P with IV contrast ordered, as pt now with acute anemia as below  - PT/OT consults  - Fall risk protocol
Pt found by her cousin down on the floor, awake and alert. Pt insists that she slid off a chair due to the chair moving as she was trying to sit down due to weakness, especially in her legs. No head strike or LOC/syncope.   - CTH noncontrast negative for acute findings.  - orthostatics negative   - TTE ordered, as pt with grade 3/6 murmur on exam   - CT C/A/P with IV contrast ordered, as pt now with acute anemia as below  - PT/OT consults  - Fall risk protocol
Pt found by her cousin down on the floor, awake and alert. Pt insists that she slid off a chair due to the chair moving as she was trying to sit down due to weakness, especially in her legs. No head strike or LOC/syncope.   - CTH noncontrast negative for acute findings.  - orthostatics negative   - TTE w/ mild global left ventricular systolic dysfunction, mild diastolic dysfunction  - CT C/A/P with IV contrast ordered, as pt now with acute anemia as below  - PT/OT consults  - Fall risk protocol
Pt found by her cousin down on the floor, awake and alert. Pt insists that she slid off a chair due to the chair moving as she was trying to sit down due to weakness, especially in her legs. No head strike or LOC/syncope.   - CTH noncontrast negative for acute findings.  - orthostatics negative   - TTE ordered, as pt with grade 3/6 murmur on exam   - CT C/A/P with IV contrast ordered, as pt now with acute anemia as below  - PT/OT consults  - Fall risk protocol
Pt found by her cousin down on the floor, awake and alert. Pt insists that she slid off a chair due to the chair moving as she was trying to sit down due to weakness, especially in her legs. No head strike or LOC/syncope.   - CTH noncontrast negative for acute findings.  - orthostatics negative   - TTE w/ mild global left ventricular systolic dysfunction, mild diastolic dysfunction  - CT C/A/P with IV contrast ordered, as pt now with acute anemia as below  - Fall risk protocol

## 2023-04-11 NOTE — PROGRESS NOTE ADULT - PROBLEM SELECTOR PLAN 5
-  Likely 2/2 demand from fall and sepsis/COVID-19.  - Hs-trops 68 -> 65 -> 62   - EKG on admission with lots of artifact but no obvious OSMANY or clinically significant STD. Pt without any chest pain or other anginal symptoms.  - Monitor on tele  - TTE w/ mild global left ventricular systolic dysfunction, mild diastolic dysfunction
-  Likely 2/2 demand from fall and sepsis/COVID-19.  - Hs-trops 68 -> 65 -> 62   - EKG on admission with lots of artifact but no obvious OSMANY or clinically significant STD. Pt without any chest pain or other anginal symptoms.  - Monitor on tele  - TTE w/ mild global left ventricular systolic dysfunction, mild diastolic dysfunction
-  Likely 2/2 demand from fall and sepsis/COVID-19.  - Hs-trops 68 -> 65 -> 62   - EKG on admission with lots of artifact but no obvious OSMANY or clinically significant STD. Pt without any chest pain or other anginal symptoms.  - Monitor on tele  - check TTE
-  Likely 2/2 demand from fall and sepsis/COVID-19.  - Hs-trops 68 -> 65 -> 62   - EKG on admission with lots of artifact but no obvious OSMANY or clinically significant STD. Pt without any chest pain or other anginal symptoms.  - Monitor on tele  - TTE w/ mild global left ventricular systolic dysfunction, mild diastolic dysfunction
-  Likely 2/2 demand from fall and sepsis/COVID-19.  - Hs-trops 68 -> 65 -> 62   - EKG on admission with lots of artifact but no obvious OSMANY or clinically significant STD. Pt without any chest pain or other anginal symptoms.  - Monitor on tele  - TTE w/ mild global left ventricular systolic dysfunction, mild diastolic dysfunction

## 2023-04-11 NOTE — DISCHARGE NOTE PROVIDER - NSDCCPCAREPLAN_GEN_ALL_CORE_FT
PRINCIPAL DISCHARGE DIAGNOSIS  Diagnosis: Sepsis  Assessment and Plan of Treatment: You were admitted for a COVID-19 infection. You were given IV antiviral medications to help treat the infection. You did not require oxygen.         SECONDARY DISCHARGE DIAGNOSES  Diagnosis: 2019 novel coronavirus disease (COVID-19)  Assessment and Plan of Treatment: You were admitted for a COVID-19 infection. You were given IV antiviral medications to help treat the infection. You did not require oxygen.    Diagnosis: Demand ischemia of myocardium  Assessment and Plan of Treatment: Your infection caused stress on your heart and you were seen by the cardiologist who started you on Coreg to help your heart recover. Please see your PCP or general cardiology in 1-2 weeks.    Diagnosis: HFrEF (heart failure with reduced ejection fraction)  Assessment and Plan of Treatment: Your infection caused stress on your heart and you were seen by the cardiologist who started you on Coreg to help your heart recover. Please see your PCP or general cardiology in 1-2 weeks.    Diagnosis: Anemia  Assessment and Plan of Treatment: Your blood count level was around 8 during this admission. You had a CT scan of your body to make sure there was no bleeding internally and it was negative. Please see your PCP to further workup your anemia.

## 2023-04-11 NOTE — PROGRESS NOTE ADULT - SUBJECTIVE AND OBJECTIVE BOX
[facility] Division of [facility]  Lester DonDO  Pager (M-F, 8A-5P): [phone]      Patient is a 88y old  Female who presents with a chief complaint of Fall, weakness, COVID-19 (11 Apr 2023 07:56)      SUBJECTIVE / OVERNIGHT EVENTS: no overnight events   ADDITIONAL REVIEW OF SYSTEMS:    MEDICATIONS  (STANDING):  carvedilol 3.125 milliGRAM(s) Oral every 12 hours  dorzolamide 2%/timolol 0.5% Ophthalmic Solution 1 Drop(s) Both EYES <User Schedule>  DULoxetine 30 milliGRAM(s) Oral daily  enoxaparin Injectable 30 milliGRAM(s) SubCutaneous every 24 hours  latanoprost 0.005% Ophthalmic Solution 1 Drop(s) Both EYES at bedtime    MEDICATIONS  (PRN):  acetaminophen     Tablet .. 650 milliGRAM(s) Oral every 6 hours PRN Temp greater or equal to 38C (100.4F), Mild Pain (1 - 3), Moderate Pain (4 - 6), Severe Pain (7 - 10)      CAPILLARY BLOOD GLUCOSE        I&O's Summary    10 Apr 2023 07:01  -  11 Apr 2023 07:00  --------------------------------------------------------  IN: 1000 mL / OUT: 1600 mL / NET: -600 mL    11 Apr 2023 07:01  -  11 Apr 2023 09:13  --------------------------------------------------------  IN: 240 mL / OUT: 250 mL / NET: -10 mL        PHYSICAL EXAM:  Vital Signs Last 24 Hrs  T(C): 37.1 (11 Apr 2023 05:10), Max: 37.1 (10 Apr 2023 22:19)  T(F): 98.7 (11 Apr 2023 05:10), Max: 98.7 (10 Apr 2023 22:19)  HR: 75 (11 Apr 2023 05:10) (75 - 78)  BP: 146/72 (11 Apr 2023 05:10) (141/76 - 156/78)  BP(mean): --  RR: 16 (11 Apr 2023 05:10) (16 - 18)  SpO2: 98% (11 Apr 2023 05:10) (98% - 100%)    Parameters below as of 11 Apr 2023 05:10  Patient On (Oxygen Delivery Method): room air      CONSTITUTIONAL: NAD, well-developed, well-groomed  EYES: EOMI; conjunctiva and sclera clear  ENMT: Moist oral mucosa, no pharyngeal injection or exudates; normal dentition  NECK: Supple, no palpable masses; no thyromegaly  RESPIRATORY: Normal respiratory effort; lungs are clear to auscultation bilaterally  CARDIOVASCULAR: Regular rate and rhythm, normal S1 and S2, no murmur/rub/gallop; No lower extremity edema; Peripheral pulses are 2+ bilaterally  ABDOMEN: Nontender to palpation, normoactive bowel sounds, no rebound/guarding; No hepatosplenomegaly  MUSKULOSKELETAL:  no clubbing or cyanosis of digits; no joint swelling or tenderness to palpation  PSYCH: A+O to person, place, and time; affect appropriate  NEUROLOGY: CN 2-12 are intact and symmetric; no gross sensory deficits;   SKIN: No rashes; no palpable lesions    LABS:                        10.9   3.91  )-----------( 260      ( 11 Apr 2023 06:10 )             34.5     04-11    138  |  104  |  12  ----------------------------<  91  4.1   |  25  |  0.58    Ca    8.7      11 Apr 2023 06:10  Phos  3.4     04-11  Mg     2.10     04-11                  RADIOLOGY & ADDITIONAL TESTS:  Results Reviewed:   Imaging Personally Reviewed:  Electrocardiogram Personally Reviewed:    COORDINATION OF CARE:  Care Discussed with Consultants/Other Providers [Y/N]: Y  Prior or Outpatient Records Reviewed [Y/N]: RASHEEDA   [facility] Division of [facility]  Lester DonDO  Pager (M-F, 8A-5P): [phone]      Patient is a 88y old  Female who presents with a chief complaint of Fall, weakness, COVID-19 (11 Apr 2023 07:56)      SUBJECTIVE / OVERNIGHT EVENTS: no overnight events   ADDITIONAL REVIEW OF SYSTEMS:    MEDICATIONS  (STANDING):  carvedilol 3.125 milliGRAM(s) Oral every 12 hours  dorzolamide 2%/timolol 0.5% Ophthalmic Solution 1 Drop(s) Both EYES <User Schedule>  DULoxetine 30 milliGRAM(s) Oral daily  enoxaparin Injectable 30 milliGRAM(s) SubCutaneous every 24 hours  latanoprost 0.005% Ophthalmic Solution 1 Drop(s) Both EYES at bedtime    MEDICATIONS  (PRN):  acetaminophen     Tablet .. 650 milliGRAM(s) Oral every 6 hours PRN Temp greater or equal to 38C (100.4F), Mild Pain (1 - 3), Moderate Pain (4 - 6), Severe Pain (7 - 10)      CAPILLARY BLOOD GLUCOSE        I&O's Summary    10 Apr 2023 07:01  -  11 Apr 2023 07:00  --------------------------------------------------------  IN: 1000 mL / OUT: 1600 mL / NET: -600 mL    11 Apr 2023 07:01  -  11 Apr 2023 09:13  --------------------------------------------------------  IN: 240 mL / OUT: 250 mL / NET: -10 mL        PHYSICAL EXAM:  Vital Signs Last 24 Hrs  T(C): 37.1 (11 Apr 2023 05:10), Max: 37.1 (10 Apr 2023 22:19)  T(F): 98.7 (11 Apr 2023 05:10), Max: 98.7 (10 Apr 2023 22:19)  HR: 75 (11 Apr 2023 05:10) (75 - 78)  BP: 146/72 (11 Apr 2023 05:10) (141/76 - 156/78)  BP(mean): --  RR: 16 (11 Apr 2023 05:10) (16 - 18)  SpO2: 98% (11 Apr 2023 05:10) (98% - 100%)    Parameters below as of 11 Apr 2023 05:10  Patient On (Oxygen Delivery Method): room air      CONSTITUTIONAL: NAD, well-developed, well-groomed  EYES: EOMI; conjunctiva and sclera clear  ENMT: Moist oral mucosa, no pharyngeal injection or exudates; normal dentition  NECK: Supple, no palpable masses; no thyromegaly  RESPIRATORY: Normal respiratory effort; lungs are clear to auscultation bilaterally  CARDIOVASCULAR: Regular rate and rhythm, normal S1 and S2, no murmur/rub/gallop; No lower extremity edema; Peripheral pulses are 2+ bilaterally  ABDOMEN: Nontender to palpation, normoactive bowel sounds, no rebound/guarding; No hepatosplenomegaly  MUSKULOSKELETAL:  no clubbing or cyanosis of digits; no joint swelling or tenderness to palpation  PSYCH: A+O to person, place, and time; affect appropriate  NEUROLOGY: CN 2-12 are intact and symmetric; no gross sensory deficits;   SKIN: No rashes; no palpable lesions    LABS:                        10.9   3.91  )-----------( 260      ( 11 Apr 2023 06:10 )             34.5     04-11    138  |  104  |  12  ----------------------------<  91  4.1   |  25  |  0.58    Ca    8.7      11 Apr 2023 06:10  Phos  3.4     04-11  Mg     2.10     04-11                  RADIOLOGY & ADDITIONAL TESTS:  Results Reviewed:   Imaging Personally Reviewed:  Electrocardiogram Personally Reviewed:    COORDINATION OF CARE:  Care Discussed with Consultants/Other Providers [Y/N]: Y  Prior or Outpatient Records Reviewed [Y/N]: RASHEEDA   [facility] Division of [facility]  Lester DonDO  Pager (M-F, 8A-5P): [phone]      Patient is a 88y old  Female who presents with a chief complaint of Fall, weakness, COVID-19 (11 Apr 2023 07:56)      SUBJECTIVE / OVERNIGHT EVENTS: no overnight events   ADDITIONAL REVIEW OF SYSTEMS:    MEDICATIONS  (STANDING):  carvedilol 3.125 milliGRAM(s) Oral every 12 hours  dorzolamide 2%/timolol 0.5% Ophthalmic Solution 1 Drop(s) Both EYES <User Schedule>  DULoxetine 30 milliGRAM(s) Oral daily  enoxaparin Injectable 30 milliGRAM(s) SubCutaneous every 24 hours  latanoprost 0.005% Ophthalmic Solution 1 Drop(s) Both EYES at bedtime    MEDICATIONS  (PRN):  acetaminophen     Tablet .. 650 milliGRAM(s) Oral every 6 hours PRN Temp greater or equal to 38C (100.4F), Mild Pain (1 - 3), Moderate Pain (4 - 6), Severe Pain (7 - 10)      CAPILLARY BLOOD GLUCOSE        I&O's Summary    10 Apr 2023 07:01  -  11 Apr 2023 07:00  --------------------------------------------------------  IN: 1000 mL / OUT: 1600 mL / NET: -600 mL    11 Apr 2023 07:01  -  11 Apr 2023 09:13  --------------------------------------------------------  IN: 240 mL / OUT: 250 mL / NET: -10 mL        PHYSICAL EXAM:  Vital Signs Last 24 Hrs  T(C): 37.1 (11 Apr 2023 05:10), Max: 37.1 (10 Apr 2023 22:19)  T(F): 98.7 (11 Apr 2023 05:10), Max: 98.7 (10 Apr 2023 22:19)  HR: 75 (11 Apr 2023 05:10) (75 - 78)  BP: 146/72 (11 Apr 2023 05:10) (141/76 - 156/78)  BP(mean): --  RR: 16 (11 Apr 2023 05:10) (16 - 18)  SpO2: 98% (11 Apr 2023 05:10) (98% - 100%)    Parameters below as of 11 Apr 2023 05:10  Patient On (Oxygen Delivery Method): room air      CONSTITUTIONAL: NAD, well-developed, well-groomed  EYES: EOMI; conjunctiva and sclera clear  ENMT: Moist oral mucosa, no pharyngeal injection or exudates; normal dentition  NECK: Supple, no palpable masses; no thyromegaly  RESPIRATORY: Normal respiratory effort; lungs are clear to auscultation bilaterally  CARDIOVASCULAR: Regular rate and rhythm, normal S1 and S2, no murmur/rub/gallop; No lower extremity edema; Peripheral pulses are 2+ bilaterally  ABDOMEN: Nontender to palpation, normoactive bowel sounds, no rebound/guarding; No hepatosplenomegaly  MUSKULOSKELETAL:  no clubbing or cyanosis of digits; no joint swelling or tenderness to palpation  PSYCH: A+O to person, place, and time; affect appropriate  NEUROLOGY: CN 2-12 are intact and symmetric; no gross sensory deficits;   SKIN: No rashes; no palpable lesions    LABS:                        10.9   3.91  )-----------( 260      ( 11 Apr 2023 06:10 )             34.5     04-11    138  |  104  |  12  ----------------------------<  91  4.1   |  25  |  0.58    Ca    8.7      11 Apr 2023 06:10  Phos  3.4     04-11  Mg     2.10     04-11                  RADIOLOGY & ADDITIONAL TESTS:  Results Reviewed:   Imaging Personally Reviewed:  Electrocardiogram Personally Reviewed:    COORDINATION OF CARE:  Care Discussed with Consultants/Other Providers [Y/N]: Y  Prior or Outpatient Records Reviewed [Y/N]: RASHEEDA   [facility] Division of [facility]  Olyakrish WetzelelDO  Pager (M-F, 8A-5P): [phone]      Patient is a 88y old  Female who presents with a chief complaint of Fall, weakness, COVID-19 (11 Apr 2023 07:56)      SUBJECTIVE / OVERNIGHT EVENTS: no overnight events, pt feeling well today, minimal cough. wants to get out of bed into chair  ADDITIONAL REVIEW OF SYSTEMS: no cp/sob     MEDICATIONS  (STANDING):  carvedilol 3.125 milliGRAM(s) Oral every 12 hours  dorzolamide 2%/timolol 0.5% Ophthalmic Solution 1 Drop(s) Both EYES <User Schedule>  DULoxetine 30 milliGRAM(s) Oral daily  enoxaparin Injectable 30 milliGRAM(s) SubCutaneous every 24 hours  latanoprost 0.005% Ophthalmic Solution 1 Drop(s) Both EYES at bedtime    MEDICATIONS  (PRN):  acetaminophen     Tablet .. 650 milliGRAM(s) Oral every 6 hours PRN Temp greater or equal to 38C (100.4F), Mild Pain (1 - 3), Moderate Pain (4 - 6), Severe Pain (7 - 10)      CAPILLARY BLOOD GLUCOSE        I&O's Summary    10 Apr 2023 07:01  -  11 Apr 2023 07:00  --------------------------------------------------------  IN: 1000 mL / OUT: 1600 mL / NET: -600 mL    11 Apr 2023 07:01  -  11 Apr 2023 09:13  --------------------------------------------------------  IN: 240 mL / OUT: 250 mL / NET: -10 mL        PHYSICAL EXAM:  Vital Signs Last 24 Hrs  T(C): 37.1 (11 Apr 2023 05:10), Max: 37.1 (10 Apr 2023 22:19)  T(F): 98.7 (11 Apr 2023 05:10), Max: 98.7 (10 Apr 2023 22:19)  HR: 75 (11 Apr 2023 05:10) (75 - 78)  BP: 146/72 (11 Apr 2023 05:10) (141/76 - 156/78)  BP(mean): --  RR: 16 (11 Apr 2023 05:10) (16 - 18)  SpO2: 98% (11 Apr 2023 05:10) (98% - 100%)    Parameters below as of 11 Apr 2023 05:10  Patient On (Oxygen Delivery Method): room air      CONSTITUTIONAL: NAD, well-appearing   HEENT: EOMI, MMM  RESPIRATORY: overly clear, no wheeze   CARDIOVASCULAR: Regular rate and rhythm, normal S1 and S2, no murmurs  ABDOMEN: Nontender to palpation, normoactive bowel sounds, soft   MUSKULOSKELETAL:  no clubbing or cyanosis of digits; no joint swelling or tenderness to palpation  PSYCH: calm, cooperative   NEUROLOGY: nonfocal   SKIN: No rashes; no palpable lesions    LABS:                        10.9   3.91  )-----------( 260      ( 11 Apr 2023 06:10 )             34.5     04-11    138  |  104  |  12  ----------------------------<  91  4.1   |  25  |  0.58    Ca    8.7      11 Apr 2023 06:10  Phos  3.4     04-11  Mg     2.10     04-11                  RADIOLOGY & ADDITIONAL TESTS:  Results Reviewed:   Imaging Personally Reviewed:  Electrocardiogram Personally Reviewed:    COORDINATION OF CARE:  Care Discussed with Consultants/Other Providers [Y/N]: Y  Prior or Outpatient Records Reviewed [Y/N]: Y   [facility] Division of [facility] Medicine  Olyakrish WetzelelDO  Pager (M-F, 8A-5P): [phone]      Patient is a 88y old  Female who presents with a chief complaint of Fall, weakness, COVID-19 (11 Apr 2023 07:56)      SUBJECTIVE / OVERNIGHT EVENTS: no overnight events, pt feeling well today, minimal cough. wants to get out of bed into chair  ADDITIONAL REVIEW OF SYSTEMS: no cp/sob     MEDICATIONS  (STANDING):  carvedilol 3.125 milliGRAM(s) Oral every 12 hours  dorzolamide 2%/timolol 0.5% Ophthalmic Solution 1 Drop(s) Both EYES <User Schedule>  DULoxetine 30 milliGRAM(s) Oral daily  enoxaparin Injectable 30 milliGRAM(s) SubCutaneous every 24 hours  latanoprost 0.005% Ophthalmic Solution 1 Drop(s) Both EYES at bedtime    MEDICATIONS  (PRN):  acetaminophen     Tablet .. 650 milliGRAM(s) Oral every 6 hours PRN Temp greater or equal to 38C (100.4F), Mild Pain (1 - 3), Moderate Pain (4 - 6), Severe Pain (7 - 10)      CAPILLARY BLOOD GLUCOSE        I&O's Summary    10 Apr 2023 07:01  -  11 Apr 2023 07:00  --------------------------------------------------------  IN: 1000 mL / OUT: 1600 mL / NET: -600 mL    11 Apr 2023 07:01  -  11 Apr 2023 09:13  --------------------------------------------------------  IN: 240 mL / OUT: 250 mL / NET: -10 mL        PHYSICAL EXAM:  Vital Signs Last 24 Hrs  T(C): 37.1 (11 Apr 2023 05:10), Max: 37.1 (10 Apr 2023 22:19)  T(F): 98.7 (11 Apr 2023 05:10), Max: 98.7 (10 Apr 2023 22:19)  HR: 75 (11 Apr 2023 05:10) (75 - 78)  BP: 146/72 (11 Apr 2023 05:10) (141/76 - 156/78)  BP(mean): --  RR: 16 (11 Apr 2023 05:10) (16 - 18)  SpO2: 98% (11 Apr 2023 05:10) (98% - 100%)    Parameters below as of 11 Apr 2023 05:10  Patient On (Oxygen Delivery Method): room air      CONSTITUTIONAL: NAD, well-appearing   HEENT: EOMI, MMM  RESPIRATORY: overly clear, no wheeze   CARDIOVASCULAR: Regular rate and rhythm, normal S1 and S2, no murmurs  ABDOMEN: Nontender to palpation, normoactive bowel sounds, soft   MUSKULOSKELETAL:  no clubbing or cyanosis of digits; no joint swelling or tenderness to palpation  PSYCH: calm, cooperative   NEUROLOGY: nonfocal   SKIN: No rashes; no palpable lesions    LABS:                        10.9   3.91  )-----------( 260      ( 11 Apr 2023 06:10 )             34.5     04-11    138  |  104  |  12  ----------------------------<  91  4.1   |  25  |  0.58    Ca    8.7      11 Apr 2023 06:10  Phos  3.4     04-11  Mg     2.10     04-11                  RADIOLOGY & ADDITIONAL TESTS:  Results Reviewed:   Imaging Personally Reviewed:  Electrocardiogram Personally Reviewed:    COORDINATION OF CARE:  Care Discussed with Consultants/Other Providers [Y/N]: Y  Prior or Outpatient Records Reviewed [Y/N]: Y   [facility] Division of [facility]  Olyakrish WetzelelDO  Pager (M-F, 8A-5P): [phone]      Patient is a 88y old  Female who presents with a chief complaint of Fall, weakness, COVID-19 (11 Apr 2023 07:56)      SUBJECTIVE / OVERNIGHT EVENTS: no overnight events, pt feeling well today, minimal cough. wants to get out of bed into chair  ADDITIONAL REVIEW OF SYSTEMS: no cp/sob     MEDICATIONS  (STANDING):  carvedilol 3.125 milliGRAM(s) Oral every 12 hours  dorzolamide 2%/timolol 0.5% Ophthalmic Solution 1 Drop(s) Both EYES <User Schedule>  DULoxetine 30 milliGRAM(s) Oral daily  enoxaparin Injectable 30 milliGRAM(s) SubCutaneous every 24 hours  latanoprost 0.005% Ophthalmic Solution 1 Drop(s) Both EYES at bedtime    MEDICATIONS  (PRN):  acetaminophen     Tablet .. 650 milliGRAM(s) Oral every 6 hours PRN Temp greater or equal to 38C (100.4F), Mild Pain (1 - 3), Moderate Pain (4 - 6), Severe Pain (7 - 10)      CAPILLARY BLOOD GLUCOSE        I&O's Summary    10 Apr 2023 07:01  -  11 Apr 2023 07:00  --------------------------------------------------------  IN: 1000 mL / OUT: 1600 mL / NET: -600 mL    11 Apr 2023 07:01  -  11 Apr 2023 09:13  --------------------------------------------------------  IN: 240 mL / OUT: 250 mL / NET: -10 mL        PHYSICAL EXAM:  Vital Signs Last 24 Hrs  T(C): 37.1 (11 Apr 2023 05:10), Max: 37.1 (10 Apr 2023 22:19)  T(F): 98.7 (11 Apr 2023 05:10), Max: 98.7 (10 Apr 2023 22:19)  HR: 75 (11 Apr 2023 05:10) (75 - 78)  BP: 146/72 (11 Apr 2023 05:10) (141/76 - 156/78)  BP(mean): --  RR: 16 (11 Apr 2023 05:10) (16 - 18)  SpO2: 98% (11 Apr 2023 05:10) (98% - 100%)    Parameters below as of 11 Apr 2023 05:10  Patient On (Oxygen Delivery Method): room air      CONSTITUTIONAL: NAD, well-appearing   HEENT: EOMI, MMM  RESPIRATORY: overly clear, no wheeze   CARDIOVASCULAR: Regular rate and rhythm, normal S1 and S2, no murmurs  ABDOMEN: Nontender to palpation, normoactive bowel sounds, soft   MUSKULOSKELETAL:  no clubbing or cyanosis of digits; no joint swelling or tenderness to palpation  PSYCH: calm, cooperative   NEUROLOGY: nonfocal   SKIN: No rashes; no palpable lesions    LABS:                        10.9   3.91  )-----------( 260      ( 11 Apr 2023 06:10 )             34.5     04-11    138  |  104  |  12  ----------------------------<  91  4.1   |  25  |  0.58    Ca    8.7      11 Apr 2023 06:10  Phos  3.4     04-11  Mg     2.10     04-11                  RADIOLOGY & ADDITIONAL TESTS:  Results Reviewed:   Imaging Personally Reviewed:  Electrocardiogram Personally Reviewed:    COORDINATION OF CARE:  Care Discussed with Consultants/Other Providers [Y/N]: Y  Prior or Outpatient Records Reviewed [Y/N]: Y

## 2023-04-11 NOTE — PROGRESS NOTE ADULT - PROBLEM SELECTOR PLAN 11
- DVT ppx: Hgb stable, started Lovenox    PT recommends rehab  Updated pts nephew, Gale, over the phone on 4/10. Also updated pts sister, Fiordaliza Carl ([phone]) per pt request. - DVT ppx: Hgb stable, started Lovenox    PT recommends rehab  Updated pts nephew, Gale, over the phone on 4/10. Also updated pts sister, Fiordaliza Carl ([phone]) per pt request. - DVT ppx: Hgb stable, started Lovenox    PT recommends rehab  Updated pts nephew, Gale, over the phone on 4/10. Also updated pts sister, Fiordaliza Carl ([phone]) per pt request.

## 2023-04-11 NOTE — PROGRESS NOTE ADULT - PROBLEM SELECTOR PROBLEM 6
HFrEF (heart failure with reduced ejection fraction)
Ulcer of right leg
Ulcer of right leg
HFrEF (heart failure with reduced ejection fraction)
HFrEF (heart failure with reduced ejection fraction)

## 2023-04-11 NOTE — DISCHARGE NOTE NURSING/CASE MANAGEMENT/SOCIAL WORK - NSSCNAMETXT_GEN_ALL_CORE
[facility] [place] .  [facility] .  [facility] [place] .

## 2023-04-11 NOTE — PROGRESS NOTE ADULT - PROBLEM SELECTOR PLAN 4
In setting of viral sepsis, now resolved   - holding BP meds   - s/p IV fluids   - monitor BP
In setting of viral sepsis   - holding BP meds   - responding to IV fluids   - monitor BP
In setting of viral sepsis, now resolved   - holding BP meds   - s/p IV fluids   - monitor BP

## 2023-04-12 LAB
CULTURE RESULTS: SIGNIFICANT CHANGE UP
SPECIMEN SOURCE: SIGNIFICANT CHANGE UP

## 2023-05-01 ENCOUNTER — OUTPATIENT (OUTPATIENT)
Dept: OUTPATIENT SERVICES | Facility: HOSPITAL | Age: 88
LOS: 1 days | End: 2023-05-01
Payer: COMMERCIAL

## 2023-05-01 ENCOUNTER — APPOINTMENT (OUTPATIENT)
Dept: WOUND CARE | Facility: CLINIC | Age: 88
End: 2023-05-01
Payer: COMMERCIAL

## 2023-05-01 DIAGNOSIS — H26.9 UNSPECIFIED CATARACT: Chronic | ICD-10-CM

## 2023-05-01 DIAGNOSIS — Z86.79 PERSONAL HISTORY OF OTHER DISEASES OF THE CIRCULATORY SYSTEM: ICD-10-CM

## 2023-05-01 DIAGNOSIS — Z87.19 PERSONAL HISTORY OF OTHER DISEASES OF THE DIGESTIVE SYSTEM: ICD-10-CM

## 2023-05-01 DIAGNOSIS — Z86.718 PERSONAL HISTORY OF OTHER VENOUS THROMBOSIS AND EMBOLISM: ICD-10-CM

## 2023-05-01 DIAGNOSIS — M19.90 UNSPECIFIED OSTEOARTHRITIS, UNSPECIFIED SITE: ICD-10-CM

## 2023-05-01 DIAGNOSIS — Z90.49 ACQUIRED ABSENCE OF OTHER SPECIFIED PARTS OF DIGESTIVE TRACT: Chronic | ICD-10-CM

## 2023-05-01 DIAGNOSIS — Z86.69 PERSONAL HISTORY OF OTHER DISEASES OF THE NERVOUS SYSTEM AND SENSE ORGANS: ICD-10-CM

## 2023-05-01 PROCEDURE — 99203 OFFICE O/P NEW LOW 30 MIN: CPT

## 2023-05-01 PROCEDURE — G0463: CPT

## 2023-05-01 RX ORDER — LATANOPROST/PF 0.005 %
0.01 DROPS OPHTHALMIC (EYE)
Refills: 0 | Status: ACTIVE | COMMUNITY

## 2023-05-01 RX ORDER — CARVEDILOL 3.12 MG/1
3.12 TABLET, FILM COATED ORAL
Refills: 0 | Status: ACTIVE | COMMUNITY

## 2023-05-01 RX ORDER — DULOXETINE HYDROCHLORIDE 30 MG/1
30 CAPSULE, DELAYED RELEASE ORAL
Refills: 0 | Status: ACTIVE | COMMUNITY

## 2023-05-01 RX ORDER — DULOXETINE HYDROCHLORIDE 30 MG/1
30 CAPSULE, DELAYED RELEASE PELLETS ORAL
Refills: 0 | Status: ACTIVE | COMMUNITY

## 2023-05-01 RX ORDER — DORZOLAMIDE HYDROCHLORIDE AND TIMOLOL MALEATE PRESERVATIVE FREE 20; 5 MG/ML; MG/ML
2-0.5 SOLUTION/ DROPS OPHTHALMIC
Refills: 0 | Status: ACTIVE | COMMUNITY

## 2023-05-01 RX ORDER — CARVEDILOL 3.12 MG/1
TABLET, FILM COATED ORAL
Refills: 0 | Status: ACTIVE | COMMUNITY

## 2023-05-01 NOTE — HISTORY OF PRESENT ILLNESS
[FreeTextEntry1] : The patient is seen for management of an ulceration on the anterior aspect of the right ankle of 1 month duration starting after a period of swelling initially seen by her vascular surgeon Dr. Barraza.  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  Unsure of the local care being provided by home nursing

## 2023-05-01 NOTE — PLAN
[FreeTextEntry1] : The patient is referred for both venous and arterial evaluation to assess the status of both.\par - Will continue with local wound care with medihoney for now pending the results of above.  \par - I will see her back in 2 weeks at which time will likely initiate unna boot if her arterial side can tolerate.

## 2023-05-15 ENCOUNTER — APPOINTMENT (OUTPATIENT)
Dept: WOUND CARE | Facility: CLINIC | Age: 88
End: 2023-05-15
Payer: SELF-PAY

## 2023-05-15 ENCOUNTER — OUTPATIENT (OUTPATIENT)
Dept: OUTPATIENT SERVICES | Facility: HOSPITAL | Age: 88
LOS: 1 days | End: 2023-05-15
Payer: MEDICARE

## 2023-05-15 VITALS — TEMPERATURE: 97.3 F

## 2023-05-15 DIAGNOSIS — H26.9 UNSPECIFIED CATARACT: Chronic | ICD-10-CM

## 2023-05-15 DIAGNOSIS — Z90.49 ACQUIRED ABSENCE OF OTHER SPECIFIED PARTS OF DIGESTIVE TRACT: Chronic | ICD-10-CM

## 2023-05-15 PROCEDURE — G0463: CPT

## 2023-05-15 PROCEDURE — 99213 OFFICE O/P EST LOW 20 MIN: CPT

## 2023-05-15 NOTE — PLAN
[FreeTextEntry1] : The patient has an appointment for both venous and arterial evaluation to assess the status of both\par - Will continue with local wound care with medihoney for now pending the results of above.  \par - The wound was excisionally debrided with a #15 blade to the sub cutaneous level but not further and a light unna boot was applied.  She is instructed to remove after 5 days and re-apply if tolerated.\par - F/U in 2 weeks

## 2023-05-15 NOTE — HISTORY OF PRESENT ILLNESS
[FreeTextEntry1] : The patient is seen for management of an ulceration on the anterior aspect of the right ankle of 1.5  month duration starting after a period of swelling initially seen by her vascular surgeon Dr. Barraza.  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  Unsure of the local care being provided by home nursing

## 2023-05-22 NOTE — ED PROVIDER NOTE - CONSTITUTIONAL, MLM
Well appearing, awake, alert, oriented to person, place, time/situation and in no apparent distress. normal... yes

## 2023-06-05 ENCOUNTER — APPOINTMENT (OUTPATIENT)
Dept: WOUND CARE | Facility: CLINIC | Age: 88
End: 2023-06-05
Payer: MEDICARE

## 2023-06-05 ENCOUNTER — OUTPATIENT (OUTPATIENT)
Dept: OUTPATIENT SERVICES | Facility: HOSPITAL | Age: 88
LOS: 1 days | End: 2023-06-05
Payer: MEDICARE

## 2023-06-05 VITALS — TEMPERATURE: 96.7 F

## 2023-06-05 DIAGNOSIS — Z90.49 ACQUIRED ABSENCE OF OTHER SPECIFIED PARTS OF DIGESTIVE TRACT: Chronic | ICD-10-CM

## 2023-06-05 DIAGNOSIS — L97.312 VENOUS INSUFFICIENCY (CHRONIC) (PERIPHERAL): ICD-10-CM

## 2023-06-05 DIAGNOSIS — H26.9 UNSPECIFIED CATARACT: Chronic | ICD-10-CM

## 2023-06-05 DIAGNOSIS — I87.2 VENOUS INSUFFICIENCY (CHRONIC) (PERIPHERAL): ICD-10-CM

## 2023-06-05 PROCEDURE — 99213 OFFICE O/P EST LOW 20 MIN: CPT

## 2023-06-05 PROCEDURE — G0463: CPT

## 2023-06-07 PROBLEM — I87.2 VENOUS STASIS ULCER OF RIGHT ANKLE WITH FAT LAYER EXPOSED WITHOUT VARICOSE VEINS: Status: ACTIVE | Noted: 2023-05-01

## 2023-06-07 NOTE — PLAN
[FreeTextEntry1] : 88F Right anterior leg wound\par - patient seen and evaluated \par - The patient has an appointment this coming Friday for both venous and arterial evaluation to assess the status of both\par - Will continue with local wound care with medihoney an unna boots  for now pending the results of above.  \par - The wound was excisionally debrided with a curette to the sub cutaneous level but not further and a light unna boot was applied.  She is instructed to remove after 5 days and re-apply if tolerated.\par - F/U in 2 weeks

## 2023-06-07 NOTE — HISTORY OF PRESENT ILLNESS
[FreeTextEntry1] : The patient is seen for management of an ulceration on the anterior aspect of the right ankle of 1.5  month duration starting after a period of swelling initially seen by her vascular surgeon Dr. Barraza.  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 for LOC 2/2 sepsis 1 month ago with c/o bilateral leg pain.  Unsure of the local care being provided by home nursing

## 2023-06-16 ENCOUNTER — APPOINTMENT (OUTPATIENT)
Dept: VASCULAR SURGERY | Facility: CLINIC | Age: 88
End: 2023-06-16
Payer: MEDICARE

## 2023-06-16 VITALS
WEIGHT: 118 LBS | TEMPERATURE: 98.5 F | HEART RATE: 103 BPM | DIASTOLIC BLOOD PRESSURE: 95 MMHG | SYSTOLIC BLOOD PRESSURE: 170 MMHG | HEIGHT: 63 IN | BODY MASS INDEX: 20.91 KG/M2

## 2023-06-16 DIAGNOSIS — L97.312 NON-PRESSURE CHRONIC ULCER OF RIGHT ANKLE WITH FAT LAYER EXPOSED: ICD-10-CM

## 2023-06-16 PROCEDURE — 93923 UPR/LXTR ART STDY 3+ LVLS: CPT

## 2023-06-16 PROCEDURE — 93970 EXTREMITY STUDY: CPT

## 2023-06-16 PROCEDURE — 99204 OFFICE O/P NEW MOD 45 MIN: CPT

## 2023-06-16 NOTE — HISTORY OF PRESENT ILLNESS
[FreeTextEntry1] : Patient is a 88 year old female with history of CAD, CHF, DVT, lower extremity vein ablation? who presents to the office for evaluation of an ulcer on her right leg. Patient states she has a history of left leg DVT a few years ago and then this past year developed a leg ulcer on her left leg which healed. She states her right leg started to swell and drain a lot of fluid and then noticed an ulcer >2 months ago. She follows with wound care, and wound has appeared but is not going away. She denies any history of claudication.

## 2023-06-16 NOTE — PHYSICAL EXAM
[Respiratory Effort] : normal respiratory effort [0] : left 0 [Ankle Swelling (On Exam)] : present [Ankle Swelling Bilaterally] : bilaterally  [] : bilaterally [Alert] : alert [Oriented to Person] : oriented to person [Oriented to Place] : oriented to place [Oriented to Time] : oriented to time [de-identified] : NAD [de-identified] : Right lower leg anterior ulcer, clean, no signs of infection

## 2023-06-16 NOTE — ASSESSMENT
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 No reflux or dvts seen \par - JENNIFER/ PVR: No findings of arterial insufficiency.\par - Would recommend continued local wound care, potential biopsy if not healing \par - Follow up PRN

## 2023-06-16 NOTE — REVIEW OF SYSTEMS
[Limb Pain] : limb pain [Limb Swelling] : limb swelling [Skin Wound] : skin wound [Negative] : Genitourinary [Eye Pain] : no eye pain [Skin Lesions] : no skin lesions [de-identified] : right  anterior leg wound

## 2023-06-16 NOTE — END OF VISIT
[] : Resident [FreeTextEntry3] : no venous insufficiency on duplex.  L dvt recanalized\par The magdiel suggests non compressible calcifed vessels however the pvr waveforms are relatively normal, suggesting adequate arterial perfusion. Given these findings, as well as wound location, I do not think angiogram is warranted.\par cont local wound care [Time Spent: ___ minutes] : I have spent [unfilled] minutes of time on the encounter.

## 2023-06-19 DIAGNOSIS — I73.9 PERIPHERAL VASCULAR DISEASE, UNSPECIFIED: ICD-10-CM

## 2023-06-19 DIAGNOSIS — L97.312 NON-PRESSURE CHRONIC ULCER OF RIGHT ANKLE WITH FAT LAYER EXPOSED: ICD-10-CM

## 2023-06-19 DIAGNOSIS — I87.2 VENOUS INSUFFICIENCY (CHRONIC) (PERIPHERAL): ICD-10-CM

## 2023-06-26 ENCOUNTER — OUTPATIENT (OUTPATIENT)
Dept: OUTPATIENT SERVICES | Facility: HOSPITAL | Age: 88
LOS: 1 days | End: 2023-06-26
Payer: MEDICARE

## 2023-06-26 ENCOUNTER — APPOINTMENT (OUTPATIENT)
Dept: WOUND CARE | Facility: CLINIC | Age: 88
End: 2023-06-26
Payer: MEDICARE

## 2023-06-26 VITALS — TEMPERATURE: 97.2 F

## 2023-06-26 DIAGNOSIS — S90.424A BLISTER (NONTHERMAL), RIGHT LESSER TOE(S), INITIAL ENCOUNTER: ICD-10-CM

## 2023-06-26 DIAGNOSIS — H26.9 UNSPECIFIED CATARACT: Chronic | ICD-10-CM

## 2023-06-26 DIAGNOSIS — Z90.49 ACQUIRED ABSENCE OF OTHER SPECIFIED PARTS OF DIGESTIVE TRACT: Chronic | ICD-10-CM

## 2023-06-26 PROCEDURE — 11730 AVULSION NAIL PLATE SIMPLE 1: CPT | Mod: TA

## 2023-06-26 NOTE — PLAN
[FreeTextEntry1] : 88F Right anterior leg wound and left hallux blister \par - patient seen and evaluated \par - Right anterior leg wound fibrogranular, no drainage, no erythema, moderate edema b/l \par - left hallux serous blister, with scant serous drainage, loosened hallux nail proximally, no erythema, no malodor, no SOI \par - Outpt JENNIEFR: non compressible calcified vessels however the pvr waveforms are relatively normal, suggesting adequate arterial perfusion\par - Aseptically deroofed L hallux blister and removed the loosened hallux nail with sterile suture removal kit to the level of dermis. Pt tolerated procedure welll\par - Applied mupirocin to the left hallux followed by DSD and applied unna boot to the RLE. \par - VNS instructions given for right foot wound \par - RTC 3 weeks

## 2023-06-26 NOTE — HISTORY OF PRESENT ILLNESS
[FreeTextEntry1] : 88F presents for left hallux blister that she got after stubbing her toe. She doesn't recall when she stubbed the toe but her VNS noted the blister and put a dressing on. She also has a RLE anterior leg wound that began a period of swelling initially seen by her vascular surgeon Dr. Barraza in the past .  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 Patient has been getting right LLE unna boot by VNS 2x per week. She recently saw Dr Beckham who states no angiogram is needed.

## 2023-07-11 ENCOUNTER — OUTPATIENT (OUTPATIENT)
Dept: OUTPATIENT SERVICES | Facility: HOSPITAL | Age: 88
LOS: 1 days | End: 2023-07-11
Payer: MEDICARE

## 2023-07-11 DIAGNOSIS — I73.9 PERIPHERAL VASCULAR DISEASE, UNSPECIFIED: ICD-10-CM

## 2023-07-11 DIAGNOSIS — Z90.49 ACQUIRED ABSENCE OF OTHER SPECIFIED PARTS OF DIGESTIVE TRACT: Chronic | ICD-10-CM

## 2023-07-11 DIAGNOSIS — H26.9 UNSPECIFIED CATARACT: Chronic | ICD-10-CM

## 2023-07-11 DIAGNOSIS — I87.2 VENOUS INSUFFICIENCY (CHRONIC) (PERIPHERAL): ICD-10-CM

## 2023-07-11 DIAGNOSIS — L97.312 NON-PRESSURE CHRONIC ULCER OF RIGHT ANKLE WITH FAT LAYER EXPOSED: ICD-10-CM

## 2023-07-11 PROCEDURE — G0463: CPT

## 2023-07-13 DIAGNOSIS — I73.9 PERIPHERAL VASCULAR DISEASE, UNSPECIFIED: ICD-10-CM

## 2023-07-13 DIAGNOSIS — S90.424A BLISTER (NONTHERMAL), RIGHT LESSER TOE(S), INITIAL ENCOUNTER: ICD-10-CM

## 2023-07-13 DIAGNOSIS — L97.912 NON-PRESSURE CHRONIC ULCER OF UNSPECIFIED PART OF RIGHT LOWER LEG WITH FAT LAYER EXPOSED: ICD-10-CM

## 2023-07-17 ENCOUNTER — OUTPATIENT (OUTPATIENT)
Dept: OUTPATIENT SERVICES | Facility: HOSPITAL | Age: 88
LOS: 1 days | End: 2023-07-17
Payer: MEDICARE

## 2023-07-17 ENCOUNTER — APPOINTMENT (OUTPATIENT)
Dept: WOUND CARE | Facility: CLINIC | Age: 88
End: 2023-07-17
Payer: MEDICARE

## 2023-07-17 VITALS
OXYGEN SATURATION: 99 % | DIASTOLIC BLOOD PRESSURE: 68 MMHG | TEMPERATURE: 97.7 F | SYSTOLIC BLOOD PRESSURE: 152 MMHG | HEART RATE: 89 BPM

## 2023-07-17 DIAGNOSIS — Z90.49 ACQUIRED ABSENCE OF OTHER SPECIFIED PARTS OF DIGESTIVE TRACT: Chronic | ICD-10-CM

## 2023-07-17 DIAGNOSIS — H26.9 UNSPECIFIED CATARACT: Chronic | ICD-10-CM

## 2023-07-17 PROCEDURE — 99213 OFFICE O/P EST LOW 20 MIN: CPT

## 2023-07-17 PROCEDURE — G0463: CPT

## 2023-07-17 NOTE — HISTORY OF PRESENT ILLNESS
[FreeTextEntry1] : 88F presents for left hallux s/p nail avulsion as well as RLE anterior leg wound that is being treated with unna boots BIW by home nursing.  Denies c/c.

## 2023-07-17 NOTE — PLAN
[FreeTextEntry1] : 88F Right anterior leg wound and left hallux blister \par - patient seen and evaluated \par - Right anterior leg wound fibrogranular, no drainage, no erythema, moderate edema b/l \par - left hallux serous blister, with scant serous drainage, loosened hallux nail proximally, no erythema, no malodor, no SOI \par - Outpt JENNIFER: non compressible calcified vessels however the pvr waveforms are relatively normal, suggesting adequate arterial perfusion\par - Applied mupirocin to the left hallux followed by DSD and applied unna boot to the RLE. \par - VNS instructions given for right foot wound \par - RTC 3 weeks

## 2023-07-25 DIAGNOSIS — I73.9 PERIPHERAL VASCULAR DISEASE, UNSPECIFIED: ICD-10-CM

## 2023-07-25 DIAGNOSIS — L97.912 NON-PRESSURE CHRONIC ULCER OF UNSPECIFIED PART OF RIGHT LOWER LEG WITH FAT LAYER EXPOSED: ICD-10-CM

## 2023-07-25 DIAGNOSIS — S90.425S: ICD-10-CM

## 2023-08-02 DIAGNOSIS — I87.2 VENOUS INSUFFICIENCY (CHRONIC) (PERIPHERAL): ICD-10-CM

## 2023-08-02 DIAGNOSIS — L97.312 NON-PRESSURE CHRONIC ULCER OF RIGHT ANKLE WITH FAT LAYER EXPOSED: ICD-10-CM

## 2023-08-02 DIAGNOSIS — Z86.69 PERSONAL HISTORY OF OTHER DISEASES OF THE NERVOUS SYSTEM AND SENSE ORGANS: ICD-10-CM

## 2023-08-02 DIAGNOSIS — I73.9 PERIPHERAL VASCULAR DISEASE, UNSPECIFIED: ICD-10-CM

## 2023-08-04 DIAGNOSIS — I73.9 PERIPHERAL VASCULAR DISEASE, UNSPECIFIED: ICD-10-CM

## 2023-08-04 DIAGNOSIS — L97.312 NON-PRESSURE CHRONIC ULCER OF RIGHT ANKLE WITH FAT LAYER EXPOSED: ICD-10-CM

## 2023-08-04 DIAGNOSIS — I87.2 VENOUS INSUFFICIENCY (CHRONIC) (PERIPHERAL): ICD-10-CM

## 2023-08-07 ENCOUNTER — OUTPATIENT (OUTPATIENT)
Dept: OUTPATIENT SERVICES | Facility: HOSPITAL | Age: 88
LOS: 1 days | End: 2023-08-07
Payer: MEDICARE

## 2023-08-07 ENCOUNTER — APPOINTMENT (OUTPATIENT)
Dept: WOUND CARE | Facility: CLINIC | Age: 88
End: 2023-08-07
Payer: MEDICARE

## 2023-08-07 DIAGNOSIS — Z90.49 ACQUIRED ABSENCE OF OTHER SPECIFIED PARTS OF DIGESTIVE TRACT: Chronic | ICD-10-CM

## 2023-08-07 DIAGNOSIS — L97.912 NON-PRESSURE CHRONIC ULCER OF UNSPECIFIED PART OF RIGHT LOWER LEG WITH FAT LAYER EXPOSED: ICD-10-CM

## 2023-08-07 DIAGNOSIS — I73.9 PERIPHERAL VASCULAR DISEASE, UNSPECIFIED: ICD-10-CM

## 2023-08-07 DIAGNOSIS — S90.425S: ICD-10-CM

## 2023-08-07 DIAGNOSIS — H26.9 UNSPECIFIED CATARACT: Chronic | ICD-10-CM

## 2023-08-07 PROCEDURE — 99213 OFFICE O/P EST LOW 20 MIN: CPT

## 2023-08-07 PROCEDURE — G0463: CPT

## 2023-08-14 NOTE — PLAN
[FreeTextEntry1] : 88F Right anterior leg wound  - Right anterior leg wound- healed - right hallux blister - healed - Outpt JENNIFER: non compressible calcified vessels however the pvr waveforms are relatively normal, suggesting adequate arterial perfusion - patient advised to wear compression stockings b/l   - RTC PRN, can follow up in VS office

## 2023-08-14 NOTE — HISTORY OF PRESENT ILLNESS
[FreeTextEntry1] : 88F presents for follow up appointment regarding left hallux wound and right anterior leg wound. Pt states that VNS has been applying unna boots to right leg three times a week.  Pt is concerned that her right leg continues to swell. Pt ambulates in surgical shoe. Pt states the dressing was too tight so she cut a bit of it on the foot yesterday. Pt denies any new pedal compalints.

## 2023-09-01 DIAGNOSIS — S90.425S: ICD-10-CM

## 2023-09-01 DIAGNOSIS — L97.912 NON-PRESSURE CHRONIC ULCER OF UNSPECIFIED PART OF RIGHT LOWER LEG WITH FAT LAYER EXPOSED: ICD-10-CM

## 2023-09-01 DIAGNOSIS — I73.9 PERIPHERAL VASCULAR DISEASE, UNSPECIFIED: ICD-10-CM

## 2023-10-02 ENCOUNTER — APPOINTMENT (OUTPATIENT)
Dept: WOUND CARE | Facility: HOSPITAL | Age: 88
End: 2023-10-02

## 2023-12-11 RX ORDER — AMLODIPINE BESYLATE 2.5 MG/1
1 TABLET ORAL
Refills: 0 | DISCHARGE

## 2023-12-11 RX ORDER — LATANOPROST 0.05 MG/ML
1 SOLUTION/ DROPS OPHTHALMIC; TOPICAL
Refills: 0 | DISCHARGE

## 2023-12-11 RX ORDER — DORZOLAMIDE HYDROCHLORIDE TIMOLOL MALEATE 20; 5 MG/ML; MG/ML
1 SOLUTION/ DROPS OPHTHALMIC
Refills: 0 | DISCHARGE

## 2023-12-11 RX ORDER — DULOXETINE HYDROCHLORIDE 30 MG/1
1 CAPSULE, DELAYED RELEASE ORAL
Refills: 0 | DISCHARGE

## 2023-12-11 RX ORDER — APIXABAN 2.5 MG/1
1 TABLET, FILM COATED ORAL
Refills: 0 | DISCHARGE

## 2024-01-28 ENCOUNTER — INPATIENT (INPATIENT)
Facility: HOSPITAL | Age: 89
LOS: 3 days | Discharge: NURSING, REHAB OR LONG-TERM CARE | End: 2024-02-01
Attending: INTERNAL MEDICINE | Admitting: INTERNAL MEDICINE
Payer: MEDICARE

## 2024-01-28 VITALS
SYSTOLIC BLOOD PRESSURE: 169 MMHG | DIASTOLIC BLOOD PRESSURE: 96 MMHG | TEMPERATURE: 97 F | HEART RATE: 92 BPM | OXYGEN SATURATION: 100 % | WEIGHT: 110.01 LBS | RESPIRATION RATE: 17 BRPM
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
## 2024-01-28 DIAGNOSIS — Z90.49 ACQUIRED ABSENCE OF OTHER SPECIFIED PARTS OF DIGESTIVE TRACT: Chronic | ICD-10-CM
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
## 2024-01-28 DIAGNOSIS — H26.9 UNSPECIFIED CATARACT: Chronic | ICD-10-CM

## 2024-01-28 LAB
BASOPHILS # BLD AUTO: 0.02 K/UL — SIGNIFICANT CHANGE UP (ref 0–0.2)
BASOPHILS NFR BLD AUTO: 0.3 % — SIGNIFICANT CHANGE UP (ref 0–2)
EOSINOPHIL # BLD AUTO: 0.02 K/UL — SIGNIFICANT CHANGE UP (ref 0–0.5)
EOSINOPHIL NFR BLD AUTO: 0.3 % — SIGNIFICANT CHANGE UP (ref 0–6)
HCT VFR BLD CALC: 36.7 % — SIGNIFICANT CHANGE UP (ref 34.5–45)
HGB BLD-MCNC: 11.9 G/DL — SIGNIFICANT CHANGE UP (ref 11.5–15.5)
IMM GRANULOCYTES NFR BLD AUTO: 0.3 % — SIGNIFICANT CHANGE UP (ref 0–0.9)
LYMPHOCYTES # BLD AUTO: 1.45 K/UL — SIGNIFICANT CHANGE UP (ref 1–3.3)
LYMPHOCYTES # BLD AUTO: 19.2 % — SIGNIFICANT CHANGE UP (ref 13–44)
MCHC RBC-ENTMCNC: 27.4 PG — SIGNIFICANT CHANGE UP (ref 27–34)
MCHC RBC-ENTMCNC: 32.4 G/DL — SIGNIFICANT CHANGE UP (ref 32–36)
MCV RBC AUTO: 84.4 FL — SIGNIFICANT CHANGE UP (ref 80–100)
MONOCYTES # BLD AUTO: 0.61 K/UL — SIGNIFICANT CHANGE UP (ref 0–0.9)
MONOCYTES NFR BLD AUTO: 8.1 % — SIGNIFICANT CHANGE UP (ref 2–14)
NEUTROPHILS # BLD AUTO: 5.45 K/UL — SIGNIFICANT CHANGE UP (ref 1.8–7.4)
NEUTROPHILS NFR BLD AUTO: 71.8 % — SIGNIFICANT CHANGE UP (ref 43–77)
NRBC # BLD: 0 /100 WBCS — SIGNIFICANT CHANGE UP (ref 0–0)
PLATELET # BLD AUTO: 268 K/UL — SIGNIFICANT CHANGE UP (ref 150–400)
RBC # BLD: 4.35 M/UL — SIGNIFICANT CHANGE UP (ref 3.8–5.2)
RBC # FLD: 15.3 % — HIGH (ref 10.3–14.5)
WBC # BLD: 7.57 K/UL — SIGNIFICANT CHANGE UP (ref 3.8–10.5)
WBC # FLD AUTO: 7.57 K/UL — SIGNIFICANT CHANGE UP (ref 3.8–10.5)

## 2024-01-28 PROCEDURE — 99285 EMERGENCY DEPT VISIT HI MDM: CPT

## 2024-01-28 RX ORDER — SODIUM CHLORIDE 9 MG/ML
1000 INJECTION INTRAMUSCULAR; INTRAVENOUS; SUBCUTANEOUS ONCE
Refills: 0 | Status: COMPLETED | OUTPATIENT
Start: 2024-01-28 | End: 2024-01-28

## 2024-01-28 RX ADMIN — SODIUM CHLORIDE 1000 MILLILITER(S): 9 INJECTION INTRAMUSCULAR; INTRAVENOUS; SUBCUTANEOUS at 23:54
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
## 2024-01-29 DIAGNOSIS — M62.82 RHABDOMYOLYSIS: ICD-10-CM

## 2024-01-29 DIAGNOSIS — R79.89 OTHER SPECIFIED ABNORMAL FINDINGS OF BLOOD CHEMISTRY: ICD-10-CM

## 2024-01-29 DIAGNOSIS — I10 ESSENTIAL (PRIMARY) HYPERTENSION: ICD-10-CM

## 2024-01-29 DIAGNOSIS — H40.9 UNSPECIFIED GLAUCOMA: ICD-10-CM

## 2024-01-29 PROBLEM — M19.90 UNSPECIFIED OSTEOARTHRITIS, UNSPECIFIED SITE: Chronic | Status: ACTIVE | Noted: 2023-04-07

## 2024-01-29 LAB
ALBUMIN SERPL ELPH-MCNC: 3.2 G/DL — LOW (ref 3.3–5)
ALP SERPL-CCNC: 131 U/L — HIGH (ref 40–120)
ALT FLD-CCNC: 17 U/L — SIGNIFICANT CHANGE UP (ref 12–78)
ANION GAP SERPL CALC-SCNC: 6 MMOL/L — SIGNIFICANT CHANGE UP (ref 5–17)
APTT BLD: 34.4 SEC — SIGNIFICANT CHANGE UP (ref 24.5–35.6)
AST SERPL-CCNC: 40 U/L — HIGH (ref 15–37)
BILIRUB SERPL-MCNC: 0.8 MG/DL — SIGNIFICANT CHANGE UP (ref 0.2–1.2)
BUN SERPL-MCNC: 13 MG/DL — SIGNIFICANT CHANGE UP (ref 7–23)
CALCIUM SERPL-MCNC: 9.5 MG/DL — SIGNIFICANT CHANGE UP (ref 8.5–10.1)
CHLORIDE SERPL-SCNC: 106 MMOL/L — SIGNIFICANT CHANGE UP (ref 96–108)
CK SERPL-CCNC: 430 U/L — HIGH (ref 26–192)
CK SERPL-CCNC: 431 U/L — HIGH (ref 26–192)
CK SERPL-CCNC: 533 U/L — HIGH (ref 26–192)
CO2 SERPL-SCNC: 28 MMOL/L — SIGNIFICANT CHANGE UP (ref 22–31)
CREAT SERPL-MCNC: 0.78 MG/DL — SIGNIFICANT CHANGE UP (ref 0.5–1.3)
EGFR: 73 ML/MIN/1.73M2 — SIGNIFICANT CHANGE UP
GLUCOSE SERPL-MCNC: 114 MG/DL — HIGH (ref 70–99)
INR BLD: 0.97 RATIO — SIGNIFICANT CHANGE UP (ref 0.85–1.18)
POTASSIUM SERPL-MCNC: 3.8 MMOL/L — SIGNIFICANT CHANGE UP (ref 3.5–5.3)
POTASSIUM SERPL-SCNC: 3.8 MMOL/L — SIGNIFICANT CHANGE UP (ref 3.5–5.3)
PROT SERPL-MCNC: 7.2 GM/DL — SIGNIFICANT CHANGE UP (ref 6–8.3)
PROTHROM AB SERPL-ACNC: 11.5 SEC — SIGNIFICANT CHANGE UP (ref 9.5–13)
SODIUM SERPL-SCNC: 140 MMOL/L — SIGNIFICANT CHANGE UP (ref 135–145)
TROPONIN I, HIGH SENSITIVITY RESULT: 341 NG/L — HIGH
TROPONIN I, HIGH SENSITIVITY RESULT: 386.7 NG/L — HIGH
TROPONIN I, HIGH SENSITIVITY RESULT: 392.1 NG/L — HIGH

## 2024-01-29 PROCEDURE — 72170 X-RAY EXAM OF PELVIS: CPT | Mod: 26

## 2024-01-29 PROCEDURE — 93010 ELECTROCARDIOGRAM REPORT: CPT

## 2024-01-29 PROCEDURE — 71045 X-RAY EXAM CHEST 1 VIEW: CPT | Mod: 26

## 2024-01-29 PROCEDURE — 70450 CT HEAD/BRAIN W/O DYE: CPT | Mod: 26,MG

## 2024-01-29 PROCEDURE — G1004: CPT

## 2024-01-29 PROCEDURE — 99222 1ST HOSP IP/OBS MODERATE 55: CPT

## 2024-01-29 RX ORDER — DORZOLAMIDE HYDROCHLORIDE 20 MG/ML
1 SOLUTION/ DROPS OPHTHALMIC
Qty: 0 | Refills: 0 | DISCHARGE

## 2024-01-29 RX ORDER — DORZOLAMIDE HYDROCHLORIDE TIMOLOL MALEATE 20; 5 MG/ML; MG/ML
1 SOLUTION/ DROPS OPHTHALMIC
Refills: 0 | Status: DISCONTINUED | OUTPATIENT
Start: 2024-01-29 | End: 2024-02-01

## 2024-01-29 RX ORDER — DULOXETINE HYDROCHLORIDE 30 MG/1
30 CAPSULE, DELAYED RELEASE ORAL DAILY
Refills: 0 | Status: DISCONTINUED | OUTPATIENT
Start: 2024-01-29 | End: 2024-02-01

## 2024-01-29 RX ORDER — LANOLIN ALCOHOL/MO/W.PET/CERES
3 CREAM (GRAM) TOPICAL AT BEDTIME
Refills: 0 | Status: DISCONTINUED | OUTPATIENT
Start: 2024-01-29 | End: 2024-02-01

## 2024-01-29 RX ORDER — SODIUM CHLORIDE 9 MG/ML
1000 INJECTION INTRAMUSCULAR; INTRAVENOUS; SUBCUTANEOUS
Refills: 0 | Status: DISCONTINUED | OUTPATIENT
Start: 2024-01-29 | End: 2024-01-29

## 2024-01-29 RX ORDER — AMLODIPINE BESYLATE 2.5 MG/1
1 TABLET ORAL
Qty: 0 | Refills: 0 | DISCHARGE

## 2024-01-29 RX ORDER — INFLUENZA VIRUS VACCINE 15; 15; 15; 15 UG/.5ML; UG/.5ML; UG/.5ML; UG/.5ML
0.7 SUSPENSION INTRAMUSCULAR ONCE
Refills: 0 | Status: DISCONTINUED | OUTPATIENT
Start: 2024-01-29 | End: 2024-02-01

## 2024-01-29 RX ORDER — CARVEDILOL PHOSPHATE 80 MG/1
3.12 CAPSULE, EXTENDED RELEASE ORAL EVERY 12 HOURS
Refills: 0 | Status: DISCONTINUED | OUTPATIENT
Start: 2024-01-29 | End: 2024-01-31

## 2024-01-29 RX ORDER — LATANOPROST 0.05 MG/ML
1 SOLUTION/ DROPS OPHTHALMIC; TOPICAL
Qty: 0 | Refills: 0 | DISCHARGE

## 2024-01-29 RX ORDER — ACETAMINOPHEN 500 MG
650 TABLET ORAL EVERY 6 HOURS
Refills: 0 | Status: DISCONTINUED | OUTPATIENT
Start: 2024-01-29 | End: 2024-02-01

## 2024-01-29 RX ORDER — LISINOPRIL 2.5 MG/1
1 TABLET ORAL
Qty: 0 | Refills: 0 | DISCHARGE

## 2024-01-29 RX ORDER — ONDANSETRON 8 MG/1
4 TABLET, FILM COATED ORAL EVERY 8 HOURS
Refills: 0 | Status: DISCONTINUED | OUTPATIENT
Start: 2024-01-29 | End: 2024-01-29

## 2024-01-29 RX ORDER — LATANOPROST 0.05 MG/ML
1 SOLUTION/ DROPS OPHTHALMIC; TOPICAL AT BEDTIME
Refills: 0 | Status: DISCONTINUED | OUTPATIENT
Start: 2024-01-29 | End: 2024-02-01

## 2024-01-29 RX ORDER — HEPARIN SODIUM 5000 [USP'U]/ML
5000 INJECTION INTRAVENOUS; SUBCUTANEOUS EVERY 12 HOURS
Refills: 0 | Status: DISCONTINUED | OUTPATIENT
Start: 2024-01-29 | End: 2024-02-01

## 2024-01-29 RX ADMIN — CARVEDILOL PHOSPHATE 3.12 MILLIGRAM(S): 80 CAPSULE, EXTENDED RELEASE ORAL at 05:11

## 2024-01-29 RX ADMIN — DORZOLAMIDE HYDROCHLORIDE TIMOLOL MALEATE 1 DROP(S): 20; 5 SOLUTION/ DROPS OPHTHALMIC at 11:55

## 2024-01-29 RX ADMIN — SODIUM CHLORIDE 50 MILLILITER(S): 9 INJECTION INTRAMUSCULAR; INTRAVENOUS; SUBCUTANEOUS at 05:12

## 2024-01-29 RX ADMIN — DULOXETINE HYDROCHLORIDE 30 MILLIGRAM(S): 30 CAPSULE, DELAYED RELEASE ORAL at 11:56

## 2024-01-29 RX ADMIN — LATANOPROST 1 DROP(S): 0.05 SOLUTION/ DROPS OPHTHALMIC; TOPICAL at 21:42

## 2024-01-29 RX ADMIN — CARVEDILOL PHOSPHATE 3.12 MILLIGRAM(S): 80 CAPSULE, EXTENDED RELEASE ORAL at 17:34

## 2024-01-29 NOTE — H&P ADULT - NSICDXPASTSURGICALHX_GEN_ALL_CORE_FT
PAST SURGICAL HISTORY:  Bilateral cataracts     History of right hemicolectomy     Hx of right hemicolectomy 1998    S/P bunionectomy bilateral     Suturegard Body: The suture ends were repeatedly re-tightened and re-clamped to achieve the desired tissue expansion.

## 2024-01-29 NOTE — ED ADULT NURSE NOTE - OBJECTIVE STATEMENT
covering for laureano RN 89 year old female came in accidental fall , has being on the floor since 0900,Pt stated not being on blood thinners , family member went to check on her saw the pt on the floor and called 911 hx arthritis. A&Ox3, dneies losing consciousness, in no other signs of discomfort, IV placed

## 2024-01-29 NOTE — PHYSICAL THERAPY INITIAL EVALUATION ADULT - PERTINENT HX OF CURRENT PROBLEM, REHAB EVAL
Yamel Acuna is an 89 year old female with PMHx of HTN, hx of DVT (previously on warfarin but no longer taking it), cataracts, and hx of benign colonic mass (s/p R. hemicolectomy in 1998) who presented to the ED on 1/28/24 for unwitnessed fall.    Patient reports she "toppled" onto the floor this morning and then could not get up so she just sat on her floor since 9 AM. Her friend was calling her phone but she was unable to get to her phone. Patient assumed friend got worried because she was not picking up her phone calls so friend called EMS. Upon EMS arrival, they helped patient off the floor. Baseline functional status is ambulates with walker or cane and independent with all ADLs. Lives alone and states her cousin lives downstairs of the same house as her. However, she states she thinks she now needs help around the house.

## 2024-01-29 NOTE — H&P ADULT - HISTORY OF PRESENT ILLNESS
Yamel Acuna is an 89 year old female with PMHx of HTN, hx of DVT previously on warfarin but no longer taking it), cataracts, and hx of benign colonic mass (s/p R. hemicolectomy in 1998) who presented to the ED on 1/28/24 for unwitnessed fall.    Patient reports she "toppled" onto the floor this morning and then could not get up so she just sat on her floor since 9 AM. Her friend was calling her phone but she was unable to get to her phone. Patient assumed friend got worried because she was not picking up her phone calls so friend called EMS. Upon EMS arrival, they helped patient off the floor. Baseline functional status is ambulates with walker or cane and independent with all ADLs. Lives alone and states her cousin lives downstairs of the same house as her. However, she states she think she now needs help around the house.    In the ED, VSS except BP as elevated as 169/96. Labs grossly unremarkable except alkphos 131, AST 40. Troponin 386.7, . NCHCT without acute intracranial hemorrhage, mass effect, or acute displaced calvarium fracture but with involutional chronic microvascular ischemic gliotic changes. Received 1L NS bolus. Yamel Acuna is an 89 year old female with PMHx of HTN, hx of DVT previously on warfarin but no longer taking it), cataracts, and hx of benign colonic mass (s/p R. hemicolectomy in 1998) who presented to the ED on 1/28/24 for unwitnessed fall.    Patient reports she "toppled" onto the floor this morning and then could not get up so she just sat on her floor since 9 AM. Her friend was calling her phone but she was unable to get to her phone. Patient assumed friend got worried because she was not picking up her phone calls so friend called EMS. Upon EMS arrival, they helped patient off the floor. Baseline functional status is ambulates with walker or cane and independent with all ADLs. Lives alone and states her cousin lives downstairs of the same house as her. However, she states she thinks she now needs help around the house.    In the ED, VSS except BP as elevated as 169/96. Labs grossly unremarkable except alkphos 131, AST 40. Troponin 386.7, . NCHCT without acute intracranial hemorrhage, mass effect, or acute displaced calvarium fracture but with involutional chronic microvascular ischemic gliotic changes. Received 1L NS bolus. Yamel Acuna is an 89 year old female with PMHx of HTN, hx of DVT (previously on warfarin but no longer taking it), cataracts, and hx of benign colonic mass (s/p R. hemicolectomy in 1998) who presented to the ED on 1/28/24 for unwitnessed fall.    Patient reports she "toppled" onto the floor this morning and then could not get up so she just sat on her floor since 9 AM. Her friend was calling her phone but she was unable to get to her phone. Patient assumed friend got worried because she was not picking up her phone calls so friend called EMS. Upon EMS arrival, they helped patient off the floor. Baseline functional status is ambulates with walker or cane and independent with all ADLs. Lives alone and states her cousin lives downstairs of the same house as her. However, she states she thinks she now needs help around the house.    In the ED, VSS except BP as elevated as 169/96. Labs grossly unremarkable except alkphos 131, AST 40. Troponin 386.7, . NCHCT without acute intracranial hemorrhage, mass effect, or acute displaced calvarium fracture but with involutional chronic microvascular ischemic gliotic changes. Received 1L NS bolus.

## 2024-01-29 NOTE — PATIENT PROFILE ADULT - FALL HARM RISK - HARM RISK INTERVENTIONS

## 2024-01-29 NOTE — H&P ADULT - NSHPPHYSICALEXAM_GEN_ALL_CORE
T(C): 36.4 (01-29-24 @ 02:20), Max: 36.4 (01-29-24 @ 02:20)  HR: 91 (01-29-24 @ 02:20) (91 - 92)  BP: 154/82 (01-29-24 @ 02:20) (154/82 - 169/96)  RR: 15 (01-29-24 @ 02:20) (15 - 17)  SpO2: 98% (01-29-24 @ 02:20) (98% - 100%)    CONSTITUTIONAL: Well groomed, no apparent distress  EYES: PERRLA and symmetric, EOMI  ENMT: Oral mucosa with moist membranes  RESP: No respiratory distress, no use of accessory muscles; CTA b/l  CV: RRR  GI: Soft, NT, ND  NEURO: alert

## 2024-01-29 NOTE — ED PROVIDER NOTE - CLINICAL SUMMARY MEDICAL DECISION MAKING FREE TEXT BOX
89 y/o F hx of HTN, afib not on AC (used to be on coumadin), presents s/p fall. states she was reaching over table when she lost her balance and fell onto ground. deines chest pain/sob. denies lightheadedness. states friend found her, was on ground for several hours. states she denies any pain.  A&Ox3, moving all extremities. no signs of external trauma.   CT head, labs.   EKG reviewed, TWI in V3-V6, similar to prior EKG on file from 4/2023.   admit to medicine for possible near syncope leading to fall w/ elevated troponin. no active chest pain. 89 y/o F hx of HTN, afib not on AC (used to be on coumadin), presents s/p fall. states she was reaching over table when she lost her balance and fell onto ground. deines chest pain/sob. denies lightheadedness. states friend found her, was on ground for several hours. states she denies any pain.  A&Ox3, moving all extremities. no signs of external trauma.   CT head, labs.   EKG reviewed, TWI in V3-V6, similar to prior EKG on file from 4/2023.   admit to medicine for possible near syncope leading to fall w/ elevated troponin. no active chest pain. can benefit from caridology/ PT eval.

## 2024-01-29 NOTE — H&P ADULT - ASSESSMENT
Yamel Acuna is an 89 year old female with PMHx of HTN, hx of DVT previously on warfarin but no longer taking it), cataracts, and hx of benign colonic mass (s/p R. hemicolectomy in 1998) who presented to the ED on 1/28/24 for unwitnessed fall and admitted for unwitnessed fall and elevated troponin.     Unwitnessed fall  Complaints of "toppling" onto the floor and then could not get up, states did not hit her head  NCHCT without acute intracranial hemorrhage, mass effect, or acute displaced calvarium fracture but with involutional chronic microvascular ischemic gliotic changes  Fall precautions    Elevated troponin, possibly secondary to demand ischemia  EKG with TWI in leads V3-V6 (unchanged from prior in 4/2023)  Troponin 386.7 on admission  F/u serial trops, echocardiogram  Telemetry    Mild rhabdomyolysis  Complaints of fall at home and laying on the floor since this AM   on admission  S/p 1L NS bolus in the ED  Maintenance fluids 50 cc/hr started  F/u serial CKs      Chronic medical conditions:   HTN, uncontrolled: BP as elevated as 169/96 on admission, PTA carvedilol 3.125 mg BID, will allow for more liberal BP given fall risk  Glaucoma: PTA dorzolamide / timolol 2% / 0.5% 1 gtt, latanoprost 0.005% 1 gtt qhs  Hx of LE DVT: previously on warfarin, states no longer on AC    Medication reconciliation completed using discharge med rec from [facility] from 4/11/23 as patient is unable to provide med list. Please confirm in AM. Yamel Acuna is an 89 year old female with PMHx of HTN, hx of DVT previously on warfarin but no longer taking it), cataracts, and hx of benign colonic mass (s/p R. hemicolectomy in 1998) who presented to the ED on 1/28/24 for unwitnessed fall and admitted for unwitnessed fall and elevated troponin.     Unwitnessed fall  Complaints of "toppling" onto the floor and then could not get up, states did not hit her head  NCHCT without acute intracranial hemorrhage, mass effect, or acute displaced calvarium fracture but with involutional chronic microvascular ischemic gliotic changes  Fall precautions   consulted; pt reports she was previously independent with all ADLs at home but now thinks she needs assistance at home    Elevated troponin, possibly secondary to demand ischemia  EKG with TWI in leads V3-V6 (unchanged from prior in 4/2023)  Troponin 386.7 on admission  F/u serial trops, echocardiogram  Telemetry    Mild rhabdomyolysis  Complaints of fall at home and laying on the floor since this AM   on admission  S/p 1L NS bolus in the ED  Maintenance fluids 50 cc/hr started  F/u serial CKs      Chronic medical conditions:   HTN, uncontrolled: BP as elevated as 169/96 on admission, PTA carvedilol 3.125 mg BID, will allow for more liberal BP given fall risk  Glaucoma: PTA dorzolamide / timolol 2% / 0.5% 1 gtt, latanoprost 0.005% 1 gtt qhs  Hx of LE DVT: previously on warfarin, states no longer on AC    Medication reconciliation completed using discharge med rec from [facility] from 4/11/23 as patient is unable to provide med list. Please confirm in AM. Yamel Acuna is an 89 year old female with PMHx of HTN, hx of DVT (previously on warfarin but no longer taking it), cataracts, and hx of benign colonic mass (s/p R. hemicolectomy in 1998) who presented to the ED on 1/28/24 for unwitnessed fall and admitted for unwitnessed fall and elevated troponin.     Unwitnessed fall  Complaints of "toppling" onto the floor and then could not get up, states did not hit her head  NCHCT without acute intracranial hemorrhage, mass effect, or acute displaced calvarium fracture but with involutional chronic microvascular ischemic gliotic changes  Fall precautions   consulted; pt reports she was previously independent with all ADLs at home but now thinks she needs assistance at home    Elevated troponin, possibly secondary to demand ischemia  EKG with TWI in leads V3-V6 (unchanged from prior in 4/2023)  Troponin 386.7 on admission  F/u serial trops, echocardiogram  Telemetry    Mild rhabdomyolysis  Complaints of fall at home and laying on the floor since this AM   on admission  S/p 1L NS bolus in the ED  Maintenance fluids 50 cc/hr started  F/u serial CKs      Chronic medical conditions:   HTN, uncontrolled: BP as elevated as 169/96 on admission, PTA carvedilol 3.125 mg BID, will allow for more liberal BP given fall risk  Glaucoma: PTA dorzolamide / timolol 2% / 0.5% 1 gtt, latanoprost 0.005% 1 gtt qhs  Hx of LE DVT: previously on warfarin, states no longer on AC    Medication reconciliation completed using discharge med rec from [facility] from 4/11/23 as patient is unable to provide med list. Please confirm in AM.

## 2024-01-29 NOTE — PATIENT PROFILE ADULT - FALL HARM RISK - PATIENT NEEDS ASSISTANCE
[Fever] : fever [Chills] : chills [Fatigue] : fatigue [Muscle Pain] : muscle pain [Negative] : Heme/Lymph Standing/Walking/Toileting

## 2024-01-29 NOTE — H&P ADULT - NSICDXPASTMEDICALHX_GEN_ALL_CORE_FT
PAST MEDICAL HISTORY:  Benign neoplasm of colon     Glaucoma     History of DVT of lower extremity     HTN (hypertension)     OA (osteoarthritis)

## 2024-01-29 NOTE — CHART NOTE - NSCHARTNOTEFT_GEN_A_CORE
H&P dated today reviewed in full. H&P dated today reviewed in full. Admitted for fall at home. CPK mild elevation, will stop IVF. CT head no acute findings. Trop non-specific, might be from fall.     PT eval in AM. If walks well discharge to home. H&P dated today reviewed in full. Admitted for fall at home. CPK mild elevation, will stop IVF. CT head no acute findings. Pelvic X-rays negative for fracture.     Trop non-specific, might be from fall.     PT eval in AM. If walks well discharge to home.    No need for TTE, no syncope.

## 2024-01-29 NOTE — H&P ADULT - NSHPLABSRESULTS_GEN_ALL_CORE
11.9   7.57  )-----------( 268      ( 28 Jan 2024 23:37 )             36.7   01-28    140  |  106  |  13  ----------------------------<  114<H>  3.8   |  28  |  0.78    Ca    9.5      28 Jan 2024 23:37    TPro  7.2  /  Alb  3.2<L>  /  TBili  0.8  /  DBili  x   /  AST  40<H>  /  ALT  17  /  AlkPhos  131<H>  01-28    Urinalysis Basic - ( 28 Jan 2024 23:37 )    Color: x / Appearance: x / SG: x / pH: x  Gluc: 114 mg/dL / Ketone: x  / Bili: x / Urobili: x   Blood: x / Protein: x / Nitrite: x   Leuk Esterase: x / RBC: x / WBC x   Sq Epi: x / Non Sq Epi: x / Bacteria: x    CT head without contrast 1/29/24  IMPRESSION:  No acute intracranial hemorrhage, mass effect, or acute displaced calvarium fracture. Involutional chronic microvascular ischemic gliotic changes.    If there are new or persistent symptoms, consider further evaluation with MRI provided no contraindications.

## 2024-01-29 NOTE — ED PROVIDER NOTE - PHYSICAL EXAMINATION
General: Well appearing female in no acute distress  HEENT: Normocephalic, atraumatic. Moist mucous membranes. Oropharynx clear. No lymphadenopathy.  Eyes: No scleral icterus. EOMI. LIBAN.  Neck:. Soft and supple. Full ROM without pain. No midline tenderness  Cardiac: Regular rate and regular rhythm. No murmurs, rubs, gallops. Peripheral pulses 2+ and symmetric. No LE edema.  Resp: Lungs CTAB. Speaking in full sentences. No wheezes, rales or rhonchi.  Abd: Soft, non-tender, non-distended. No guarding or rebound. No scars, masses, or lesions.  Back: Spine midline and non-tender. No CVA tenderness.    Skin: No rashes, abrasions, or lacerations.  Neuro: AO x 3. Moves all extremities symmetrically. Motor strength and sensation grossly intact.

## 2024-01-29 NOTE — ED ADULT NURSE NOTE - NSFALLUNIVINTERV_ED_ALL_ED
Bed/Stretcher in lowest position, wheels locked, appropriate side rails in place/Call bell, personal items and telephone in reach/Instruct patient to call for assistance before getting out of bed/chair/stretcher/Non-slip footwear applied when patient is off stretcher/[place] to call system/Physically safe environment - no spills, clutter or unnecessary equipment/Purposeful proactive rounding/Room/bathroom lighting operational, light cord in reach

## 2024-01-29 NOTE — ED ADULT NURSE NOTE - ED STAT RN HANDOFF DETAILS
Report endorsed to Aleena HAHN. Safety checks compld this shift.  IV sites checked Q2+remains WDL. Meds given as ord with no s/s of adverse RXNs. Fall +skin precs in place. Any issues endorsed to oncoming RN for follow up.

## 2024-01-29 NOTE — ED PROVIDER NOTE - OBJECTIVE STATEMENT
89 y/o F hx of HTN, afib not on AC (used to be on coumadin), presents s/p fall. states she was reaching over table when she lost her balance and fell onto ground. deines chest pain/sob. denies lightheadedness. states friend found her, was on ground for several hours. states she denies any pain. states she was on ground for several hours since the morning. denies dizziness. denies nausea/vomiting. denies recent illness. uses walker at baseline.

## 2024-01-29 NOTE — ED ADULT NURSE NOTE - CHIEF COMPLAINT QUOTE
accidental fall , has being on the floor since 0900, unknown if pt is in blood thinners , family member went to check on her saw the pt on the floor and called 911 hx arthritis

## 2024-01-30 PROCEDURE — 99232 SBSQ HOSP IP/OBS MODERATE 35: CPT

## 2024-01-30 RX ORDER — POLYETHYLENE GLYCOL 3350 17 G/17G
17 POWDER, FOR SOLUTION ORAL DAILY
Refills: 0 | Status: DISCONTINUED | OUTPATIENT
Start: 2024-01-30 | End: 2024-02-01

## 2024-01-30 RX ORDER — SENNA PLUS 8.6 MG/1
2 TABLET ORAL AT BEDTIME
Refills: 0 | Status: DISCONTINUED | OUTPATIENT
Start: 2024-01-30 | End: 2024-02-01

## 2024-01-30 RX ADMIN — LATANOPROST 1 DROP(S): 0.05 SOLUTION/ DROPS OPHTHALMIC; TOPICAL at 21:29

## 2024-01-30 RX ADMIN — DULOXETINE HYDROCHLORIDE 30 MILLIGRAM(S): 30 CAPSULE, DELAYED RELEASE ORAL at 13:13

## 2024-01-30 RX ADMIN — POLYETHYLENE GLYCOL 3350 17 GRAM(S): 17 POWDER, FOR SOLUTION ORAL at 13:19

## 2024-01-30 RX ADMIN — CARVEDILOL PHOSPHATE 3.12 MILLIGRAM(S): 80 CAPSULE, EXTENDED RELEASE ORAL at 05:25

## 2024-01-30 RX ADMIN — CARVEDILOL PHOSPHATE 3.12 MILLIGRAM(S): 80 CAPSULE, EXTENDED RELEASE ORAL at 17:47

## 2024-01-30 RX ADMIN — DORZOLAMIDE HYDROCHLORIDE TIMOLOL MALEATE 1 DROP(S): 20; 5 SOLUTION/ DROPS OPHTHALMIC at 13:12

## 2024-01-30 NOTE — PROGRESS NOTE ADULT - ASSESSMENT
Yamel Acuna is an 89 year old female with PMHx of HTN, hx of DVT (previously on warfarin but no longer taking it), cataracts, and hx of benign colonic mass (s/p R. hemicolectomy in 1998) who presented to the ED on 1/28/24 for unwitnessed fall and admitted for unwitnessed fall and elevated troponin.     Unwitnessed fall  Complaints of "toppling" onto the floor and then could not get up, states did not hit her head  NCHCT without acute intracranial hemorrhage, mass effect, or acute displaced calvarium fracture but with involutional chronic microvascular ischemic gliotic changes  Fall precautions   consulted; pt reports she was previously independent with all ADLs at home but now thinks she needs assistance at home    Elevated troponin, possibly secondary to demand ischemia  EKG with TWI in leads V3-V6 (unchanged from prior in 4/2023)  Troponin 386.7 on admission  F/u serial trops, echocardiogram  Telemetry    Mild rhabdomyolysis  Complaints of fall at home and laying on the floor since this AM   on admission  S/p 1L NS bolus in the ED  Maintenance fluids 50 cc/hr started  F/u serial CKs      Chronic medical conditions:   HTN, uncontrolled: BP as elevated as 169/96 on admission, PTA carvedilol 3.125 mg BID, will allow for more liberal BP given fall risk  Glaucoma: PTA dorzolamide / timolol 2% / 0.5% 1 gtt, latanoprost 0.005% 1 gtt qhs  Hx of LE DVT: previously on warfarin, states no longer on AC    Medication reconciliation completed using discharge med rec from [facility] from 4/11/23 as patient is unable to provide med list. Please confirm in AM. Yamel Acuna is an 89 year old female with PMHx of HTN, hx of DVT (previously on warfarin but no longer taking it), cataracts, and hx of benign colonic mass (s/p R. hemicolectomy in 1998) who presented to the ED on 1/28/24 for unwitnessed fall and admitted for unwitnessed fall and elevated troponin.       Unwitnessed fall  Complaints of "toppling" onto the floor and then could not get up, states did not hit her head  NCHCT without acute intracranial hemorrhage, mass effect, or acute displaced calvarium fracture but with involutional chronic microvascular ischemic gliotic changes  Fall precautions   consulted; pt reports she was previously independent with all ADLs at home but now thinks she needs assistance at home    Elevated troponin, possibly secondary to demand ischemia  EKG with TWI in leads V3-V6 (unchanged from prior in 4/2023)  Troponin 386.7 on admission  F/u serial trops, echocardiogram  Telemetry    Mild rhabdomyolysis  Complaints of fall at home and laying on the floor since this AM   on admission  S/p 1L NS bolus in the ED  Maintenance fluids 50 cc/hr started  F/u serial CKs      Chronic medical conditions:   HTN, uncontrolled: BP as elevated as 169/96 on admission, PTA carvedilol 3.125 mg BID, will allow for more liberal BP given fall risk  Glaucoma: PTA dorzolamide / timolol 2% / 0.5% 1 gtt, latanoprost 0.005% 1 gtt qhs  Hx of LE DVT: previously on warfarin, states no longer on AC    Medication reconciliation completed using discharge med rec from [facility] from 4/11/23 as patient is unable to provide med list. Please confirm in AM.    PT pascual recommends rehab, will discuss with family members and patient.  Yamel Acuna is an 89 year old female with PMHx of HTN, hx of DVT (previously on warfarin but no longer taking it), cataracts, and hx of benign colonic mass (s/p R. hemicolectomy in 1998) who presented to the ED on 1/28/24 for unwitnessed fall and admitted for unwitnessed fall and elevated troponin.       Unwitnessed fall  Complaints of "toppling" onto the floor and then could not get up, states did not hit her head  NCHCT without acute intracranial hemorrhage, mass effect, or acute displaced calvarium fracture but with involutional chronic microvascular ischemic gliotic changes  Fall precautions,  consulted; pt reports she was previously independent with all ADLs at home but now thinks she needs assistance at home    Elevated troponin, possibly secondary to demand ischemia  EKG with TWI in leads V3-V6 (unchanged from prior in 4/2023)  Troponin 386.7 on admission  F/u serial trops, echocardiogram  Telemetry can stop.     Mild rhabdomyolysis  Complaints of fall at home and laying on the floor since this AM   on admission  S/p 1L NS bolus in the ED  Maintenance fluids 50 cc/hr started  F/u serial CKs      Chronic medical conditions:   HTN, uncontrolled: BP as elevated as 169/96 on admission, PTA carvedilol 3.125 mg BID, will allow for more liberal BP given fall risk  Glaucoma: PTA dorzolamide / timolol 2% / 0.5% 1 gtt, latanoprost 0.005% 1 gtt qhs  Hx of LE DVT: previously on warfarin, states no longer on AC    Medication reconciliation completed using discharge med rec from [facility] from 4/11/23 as patient is unable to provide med list. Please confirm in AM.    PT eval recommends rehab, will discuss with family members and patient.     Will do MRI brain prior to discharge.  Yamel Acuna is an 89 year old female with PMHx of HTN, hx of DVT (previously on warfarin but no longer taking it), cataracts, and hx of benign colonic mass (s/p R. hemicolectomy in 1998) who presented to the ED on 1/28/24 for unwitnessed fall and admitted for unwitnessed fall and elevated troponin.       Unwitnessed fall  Complaints of "toppling" onto the floor and then could not get up, states did not hit her head  NCHCT without acute intracranial hemorrhage, mass effect, or acute displaced calvarium fracture but with involutional chronic microvascular ischemic gliotic changes  Fall precautions,  consulted; pt reports she was previously independent with all ADLs at home but now thinks she needs assistance at home    Elevated troponin, possibly secondary to demand ischemia  EKG with TWI in leads V3-V6 (unchanged from prior in 4/2023)  Troponin 386.7 on admission  F/u serial trops, echocardiogram  Telemetry can stop.     Mild rhabdomyolysis  Complaints of fall at home and laying on the floor since this AM   on admission  S/p 1L NS bolus in the ED  Maintenance fluids 50 cc/hr started  F/u serial CKs      Chronic medical conditions:   HTN, uncontrolled: BP as elevated as 169/96 on admission, PTA carvedilol 3.125 mg BID, will allow for more liberal BP given fall risk  Glaucoma: PTA dorzolamide / timolol 2% / 0.5% 1 gtt, latanoprost 0.005% 1 gtt qhs  Hx of LE DVT: previously on warfarin, states no longer on AC    Medication reconciliation completed using discharge med rec from [facility] from 4/11/23 as patient is unable to provide med list. Please confirm in AM.    PT eval recommends rehab, will discuss with family members and patient.     Discussed with patient, she prefers rehab. No need for brain MRI.   Yamel Acuna is an 89 year old female with PMHx of HTN, hx of DVT (previously on warfarin but no longer taking it), cataracts, and hx of benign colonic mass (s/p R. hemicolectomy in 1998) who presented to the ED on 1/28/24 for unwitnessed fall and admitted for unwitnessed fall and elevated troponin.       Unwitnessed fall  Complaints of "toppling" onto the floor and then could not get up, states did not hit her head  NCHCT without acute intracranial hemorrhage, mass effect, or acute displaced calvarium fracture but with involutional chronic microvascular ischemic gliotic changes  Fall precautions,  consulted; pt reports she was previously independent with all ADLs at home but now thinks she needs assistance at home    Elevated troponin, possibly secondary to demand ischemia  EKG with TWI in leads V3-V6 (unchanged from prior in 4/2023)  Troponin 386.7 on admission  F/u serial trops, echocardiogram  Telemetry can stop.     Mild rhabdomyolysis  Complaints of fall at home and laying on the floor since this AM   on admission  S/p 1L NS bolus in the ED  Maintenance fluids 50 cc/hr started  F/u serial CKs      Chronic medical conditions:   HTN, uncontrolled: BP as elevated as 169/96 on admission, PTA carvedilol 3.125 mg BID, will allow for more liberal BP given fall risk  Glaucoma: PTA dorzolamide / timolol 2% / 0.5% 1 gtt, latanoprost 0.005% 1 gtt qhs  Hx of LE DVT: previously on warfarin, states no longer on AC    Medication reconciliation completed using discharge med rec from [facility] from 4/11/23 as patient is unable to provide med list. Please confirm in AM.    PT eval recommends rehab, will discuss with family members and patient.     Discussed with patient, called family. Will decide in AM regarding rehab or home. No need for brain MRI.    Called family at [phone].

## 2024-01-31 LAB
RAPID RVP RESULT: SIGNIFICANT CHANGE UP
SARS-COV-2 RNA SPEC QL NAA+PROBE: SIGNIFICANT CHANGE UP
TROPONIN I, HIGH SENSITIVITY RESULT: 275.3 NG/L — HIGH

## 2024-01-31 PROCEDURE — 99232 SBSQ HOSP IP/OBS MODERATE 35: CPT

## 2024-01-31 RX ADMIN — DORZOLAMIDE HYDROCHLORIDE TIMOLOL MALEATE 1 DROP(S): 20; 5 SOLUTION/ DROPS OPHTHALMIC at 11:43

## 2024-01-31 RX ADMIN — POLYETHYLENE GLYCOL 3350 17 GRAM(S): 17 POWDER, FOR SOLUTION ORAL at 11:45

## 2024-01-31 RX ADMIN — SENNA PLUS 2 TABLET(S): 8.6 TABLET ORAL at 22:04

## 2024-01-31 RX ADMIN — CARVEDILOL PHOSPHATE 3.12 MILLIGRAM(S): 80 CAPSULE, EXTENDED RELEASE ORAL at 05:06

## 2024-01-31 RX ADMIN — LATANOPROST 1 DROP(S): 0.05 SOLUTION/ DROPS OPHTHALMIC; TOPICAL at 22:05

## 2024-01-31 RX ADMIN — DULOXETINE HYDROCHLORIDE 30 MILLIGRAM(S): 30 CAPSULE, DELAYED RELEASE ORAL at 11:45

## 2024-01-31 NOTE — PROGRESS NOTE ADULT - ASSESSMENT
Yamel Acuna is an 89 year old female with PMHx of HTN, hx of DVT (previously on warfarin but no longer taking it), cataracts, and hx of benign colonic mass (s/p R. hemicolectomy in 1998) who presented to the ED on 1/28/24 for unwitnessed fall and admitted for unwitnessed fall and elevated troponin.       Unwitnessed fall  Complaints of "toppling" onto the floor and then could not get up, states did not hit her head  NCHCT without acute intracranial hemorrhage, mass effect, or acute displaced calvarium fracture but with involutional chronic microvascular ischemic gliotic changes  Fall precautions,  consulted; pt reports she was previously independent with all ADLs at home but now thinks she needs assistance at home    Elevated troponin, possibly secondary to demand ischemia  EKG with TWI in leads V3-V6 (unchanged from prior in 4/2023)  Troponin 386.7 on admission  F/u serial trops, echocardiogram  Telemetry can stop.     Mild rhabdomyolysis  Complaints of fall at home and laying on the floor since this AM   on admission  S/p 1L NS bolus in the ED  Maintenance fluids 50 cc/hr started  F/u serial CKs      Chronic medical conditions:   HTN, uncontrolled: BP as elevated as 169/96 on admission, PTA carvedilol 3.125 mg BID, will allow for more liberal BP given fall risk  Glaucoma: PTA dorzolamide / timolol 2% / 0.5% 1 gtt, latanoprost 0.005% 1 gtt qhs  Hx of LE DVT: previously on warfarin, states no longer on AC    Medication reconciliation completed using discharge med rec from [facility] from 4/11/23 as patient is unable to provide med list. Please confirm in AM.    PT eval recommends rehab, will discuss with family members and patient.     Discussed with patient, called family. Will decide in AM regarding rehab or home. No need for brain MRI.    Called family at [phone].    Yamel Acuna is an 89 year old female with PMHx of HTN, hx of DVT (previously on warfarin but no longer taking it), cataracts, and hx of benign colonic mass (s/p R. hemicolectomy in 1998) who presented to the ED on 1/28/24 for unwitnessed fall and admitted for unwitnessed fall and elevated troponin.         Unwitnessed fall    Complaints of "toppling" onto the floor and then could not get up, states did not hit her head  NCHCT without acute intracranial hemorrhage, mass effect, or acute displaced calvarium fracture but with involutional chronic microvascular ischemic gliotic changes  Fall precautions,  consulted; pt reports she was previously independent with all ADLs at home but now thinks she needs assistance at home    Elevated troponin, possibly secondary to demand ischemia  EKG with TWI in leads V3-V6 (unchanged from prior in 4/2023)  Troponin 386.7 on admission  F/u serial trops, no need for echocardiogram  Telemetry can stop.     Mild rhabdomyolysis  Complaints of fall at home and laying on the floor since this AM   on admission  S/p 1L NS bolus in the ED  Maintenance fluids 50 cc/hr started, can stop.        Chronic medical conditions:   HTN, uncontrolled: BP as elevated as 169/96 on admission, PTA carvedilol 3.125 mg BID, will allow for more liberal BP given fall risk.    Glaucoma: PTA dorzolamide / timolol 2% / 0.5% 1 gtt, latanoprost 0.005% 1 gtt qhs    Hx of LE DVT: previously on warfarin, states no longer on AC    Plan:  PT pascual recommends rehab, discussed with family members, agree with Rehab Thursday.     Called family at [phone] on Tuesday, agree with Rehab.

## 2024-01-31 NOTE — PROGRESS NOTE ADULT - SUBJECTIVE AND OBJECTIVE BOX
INTERVAL HPI/OVERNIGHT EVENTS:  Pt seen and examined at bedside.     Allergies/Intolerance: No Known Allergies      MEDICATIONS  (STANDING):  carvedilol 3.125 milliGRAM(s) Oral every 12 hours  dorzolamide 2%/timolol 0.5% Ophthalmic Solution 1 Drop(s) Both EYES <User Schedule>  DULoxetine 30 milliGRAM(s) Oral daily  heparin   Injectable 5000 Unit(s) SubCutaneous every 12 hours  influenza  Vaccine (HIGH DOSE) 0.7 milliLiter(s) IntraMuscular once  latanoprost 0.005% Ophthalmic Solution 1 Drop(s) Both EYES at bedtime  polyethylene glycol 3350 17 Gram(s) Oral daily  senna 2 Tablet(s) Oral at bedtime    MEDICATIONS  (PRN):  acetaminophen     Tablet .. 650 milliGRAM(s) Oral every 6 hours PRN Temp greater or equal to 38C (100.4F), Mild Pain (1 - 3)  melatonin 3 milliGRAM(s) Oral at bedtime PRN Insomnia        ROS: all systems reviewed and wnl      PHYSICAL EXAMINATION:  Vital Signs Last 24 Hrs  T(C): 36.4 (31 Jan 2024 05:04), Max: 36.8 (30 Jan 2024 10:42)  T(F): 97.5 (31 Jan 2024 05:04), Max: 98.3 (30 Jan 2024 10:42)  HR: 83 (31 Jan 2024 05:04) (74 - 90)  BP: 150/77 (31 Jan 2024 05:04) (123/71 - 155/82)  BP(mean): --  RR: 18 (31 Jan 2024 05:04) (18 - 18)  SpO2: 93% (31 Jan 2024 05:04) (93% - 99%)      CAPILLARY BLOOD GLUCOSE          01-30 @ 07:01 - 01-31 @ 07:00  --------------------------------------------------------  IN: 420 mL / OUT: 0 mL / NET: 420 mL    01-31 @ 07:01 - 01-31 @ 10:14  --------------------------------------------------------  IN: 280 mL / OUT: 0 mL / NET: 280 mL        GENERAL: stable, comfortable on RA, no SOB at rest, alert.   NECK: supple, No JVD  CHEST/LUNG: clear to auscultation bilaterally; no rales, rhonchi, or wheezing b/l  HEART: normal S1, S2  ABDOMEN: BS+, soft, ND, NT   EXTREMITIES:  pulses palpable; no clubbing, cyanosis, or edema b/l LEs    LABS:                    
INTERVAL HPI/OVERNIGHT EVENTS:  Pt seen and examined at bedside.     Allergies/Intolerance: No Known Allergies      MEDICATIONS  (STANDING):  carvedilol 3.125 milliGRAM(s) Oral every 12 hours  dorzolamide 2%/timolol 0.5% Ophthalmic Solution 1 Drop(s) Both EYES <User Schedule>  DULoxetine 30 milliGRAM(s) Oral daily  heparin   Injectable 5000 Unit(s) SubCutaneous every 12 hours  influenza  Vaccine (HIGH DOSE) 0.7 milliLiter(s) IntraMuscular once  latanoprost 0.005% Ophthalmic Solution 1 Drop(s) Both EYES at bedtime    MEDICATIONS  (PRN):  acetaminophen     Tablet .. 650 milliGRAM(s) Oral every 6 hours PRN Temp greater or equal to 38C (100.4F), Mild Pain (1 - 3)  melatonin 3 milliGRAM(s) Oral at bedtime PRN Insomnia        ROS: all systems reviewed and wnl      PHYSICAL EXAMINATION:  Vital Signs Last 24 Hrs  T(C): 36.2 (30 Jan 2024 04:37), Max: 37.3 (29 Jan 2024 13:05)  T(F): 97.2 (30 Jan 2024 04:37), Max: 99.1 (29 Jan 2024 13:05)  HR: 84 (30 Jan 2024 04:37) (80 - 98)  BP: 178/80 (30 Jan 2024 04:37) (129/60 - 178/80)  BP(mean): 104 (29 Jan 2024 16:33) (104 - 104)  RR: 18 (30 Jan 2024 04:37) (16 - 18)  SpO2: 99% (30 Jan 2024 04:37) (96% - 100%)    Parameters below as of 29 Jan 2024 18:14  Patient On (Oxygen Delivery Method): room air      CAPILLARY BLOOD GLUCOSE          01-29 @ 07:01  -  01-30 @ 07:00  --------------------------------------------------------  IN: 0 mL / OUT: 700 mL / NET: -700 mL        GENERAL: stable, comfortable on RA  NECK: supple, No JVD  CHEST/LUNG: clear to auscultation bilaterally; no rales, rhonchi, or wheezing b/l  HEART: normal S1, S2  ABDOMEN: BS+, soft, ND, NT   EXTREMITIES:  pulses palpable; no clubbing, cyanosis, or edema b/l LEs      LABS:                        11.9   7.57  )-----------( 268      ( 28 Jan 2024 23:37 )             36.7     01-28    140  |  106  |  13  ----------------------------<  114<H>  3.8   |  28  |  0.78    Ca    9.5      28 Jan 2024 23:37    TPro  7.2  /  Alb  3.2<L>  /  TBili  0.8  /  DBili  x   /  AST  40<H>  /  ALT  17  /  AlkPhos  131<H>  01-28    PT/INR - ( 28 Jan 2024 23:37 )   PT: 11.5 sec;   INR: 0.97 ratio         PTT - ( 28 Jan 2024 23:37 )  PTT:34.4 sec  Urinalysis Basic - ( 28 Jan 2024 23:37 )    Color: x / Appearance: x / SG: x / pH: x  Gluc: 114 mg/dL / Ketone: x  / Bili: x / Urobili: x   Blood: x / Protein: x / Nitrite: x   Leuk Esterase: x / RBC: x / WBC x   Sq Epi: x / Non Sq Epi: x / Bacteria: x

## 2024-01-31 NOTE — DIETITIAN INITIAL EVALUATION ADULT - PERTINENT MEDS FT
MEDICATIONS  (STANDING):  carvedilol 3.125 milliGRAM(s) Oral every 12 hours  dorzolamide 2%/timolol 0.5% Ophthalmic Solution 1 Drop(s) Both EYES <User Schedule>  DULoxetine 30 milliGRAM(s) Oral daily  heparin   Injectable 5000 Unit(s) SubCutaneous every 12 hours  influenza  Vaccine (HIGH DOSE) 0.7 milliLiter(s) IntraMuscular once  latanoprost 0.005% Ophthalmic Solution 1 Drop(s) Both EYES at bedtime  polyethylene glycol 3350 17 Gram(s) Oral daily  senna 2 Tablet(s) Oral at bedtime    MEDICATIONS  (PRN):  acetaminophen     Tablet .. 650 milliGRAM(s) Oral every 6 hours PRN Temp greater or equal to 38C (100.4F), Mild Pain (1 - 3)  melatonin 3 milliGRAM(s) Oral at bedtime PRN Insomnia

## 2024-01-31 NOTE — DIETITIAN INITIAL EVALUATION ADULT - NSICDXPASTSURGICALHX_GEN_ALL_CORE_FT
PAST SURGICAL HISTORY:  Bilateral cataracts     History of right hemicolectomy     Hx of right hemicolectomy 1998    S/P bunionectomy bilateral

## 2024-01-31 NOTE — DIETITIAN INITIAL EVALUATION ADULT - PERTINENT LABORATORY DATA
01-28 Na140 mmol/L Glu 114 mg/dL<H> K+ 3.8 mmol/L Cr  0.78 mg/dL BUN 13 mg/dL 01-28 Alb 3.2 g/dL<L>    A1C with Estimated Average Glucose Result: 5.8 % (04-08-23 @ 06:09)  A1C with Estimated Average Glucose Result: 6.0 % (04-07-23 @ 08:16)

## 2024-01-31 NOTE — DIETITIAN INITIAL EVALUATION ADULT - OTHER INFO
Denies difficulty chewing/swallowing. Reports weight stable x 1 year;  pounds. Pt with no questions/preferences at this time. Made aware RD remains available.

## 2024-02-01 ENCOUNTER — TRANSCRIPTION ENCOUNTER (OUTPATIENT)
Age: 89
End: 2024-02-01

## 2024-02-01 VITALS
OXYGEN SATURATION: 98 % | RESPIRATION RATE: 18 BRPM | SYSTOLIC BLOOD PRESSURE: 117 MMHG | HEART RATE: 81 BPM | DIASTOLIC BLOOD PRESSURE: 64 MMHG | TEMPERATURE: 99 F

## 2024-02-01 PROCEDURE — 99239 HOSP IP/OBS DSCHRG MGMT >30: CPT

## 2024-02-01 RX ADMIN — POLYETHYLENE GLYCOL 3350 17 GRAM(S): 17 POWDER, FOR SOLUTION ORAL at 11:33

## 2024-02-01 RX ADMIN — DULOXETINE HYDROCHLORIDE 30 MILLIGRAM(S): 30 CAPSULE, DELAYED RELEASE ORAL at 11:33

## 2024-02-01 RX ADMIN — DORZOLAMIDE HYDROCHLORIDE TIMOLOL MALEATE 1 DROP(S): 20; 5 SOLUTION/ DROPS OPHTHALMIC at 10:03

## 2024-02-01 NOTE — DISCHARGE NOTE NURSING/CASE MANAGEMENT/SOCIAL WORK - PATIENT PORTAL LINK FT
You can access the FollowMyHealth Patient Portal offered by [facility] by registering at the following website: [email or web address]. By joining [facility]’s FollowMyHealth portal, you will also be able to view your health information using other applications (apps) compatible with our system.

## 2024-02-01 NOTE — DISCHARGE NOTE PROVIDER - HOSPITAL COURSE
Patient is a 89 year old female with PMHx of HTN, hx of DVT (previously on warfarin but no longer taking it), cataracts, and hx of benign colonic mass (s/p R. hemicolectomy in 1998) who presented to the ED on 1/28/24 for unwitnessed fall and admitted for unwitnessed fall and elevated troponin.       Unwitnessed fall  Complaints of "toppling" onto the floor and then could not get up, states did not hit her head  NCHCT without acute intracranial hemorrhage, mass effect, or acute displaced calvarium fracture but with involutional chronic microvascular ischemic gliotic changes  Fall precautions,  consulted; pt reports she was previously independent with all ADLs at home but now thinks she needs assistance at home    Elevated troponin, possibly secondary to demand ischemia  EKG with TWI in leads V3-V6 (unchanged from prior in 4/2023)  Troponin 386.7 on admission  F/u serial trops, no need for echocardiogram  Telemetry can stop.     Mild rhabdomyolysis  Complaints of fall at home and laying on the floor since this AM   on admission  S/p 1L NS bolus in the ED    HTN, uncontrolled  BP as elevated as 169/96 on admission,   PTA carvedilol 3.125 mg BID    Glaucoma  PTA dorzolamide / timolol 2% / 0.5% 1 gtt, latanoprost 0.005% 1 gtt qhs    Hx of LE DVT  previously on warfarin  states no longer on AC    PT pascual recommends rehab, discussed with family members, agree with Rehab Thursday.            Patient is a 89 year old female with PMHx of HTN, hx of DVT (previously on warfarin but no longer taking it), cataracts, and hx of benign colonic mass (s/p R. hemicolectomy in 1998) who presented to the ED on 1/28/24 for unwitnessed fall and admitted for unwitnessed fall and elevated troponin.       Unwitnessed fall  Complaints of "toppling" onto the floor and then could not get up, states did not hit her head  NCHCT without acute intracranial hemorrhage, mass effect, or acute displaced calvarium fracture but with involutional chronic microvascular ischemic gliotic changes  Fall precautions,  consulted; pt reports she was previously independent with all ADLs at home but now thinks she needs assistance at home. PT consulted and recommended [facility].     Elevated troponin, possibly secondary to demand ischemia  EKG with TWI in leads V3-V6 (unchanged from prior in 4/2023)  Troponin 386.7 on admission  Flat troponin curve.      Mild rhabdomyolysis  Complaints of fall at home and laying on the floor since this AM   on admission  S/p 1L NS bolus in the ED    HTN, better controlled.     cont current home meds. monitor blood pressure at rehab.     Glaucoma  PTA dorzolamide / timolol 2% / 0.5% 1 gtt, latanoprost 0.005% 1 gtt qhs    Hx of LE DVT  previously on warfarin  states no longer on AC    PT eval recommends rehab, discussed with family members, agree with Rehab. ok to dc home to [facility] today.     Seen and examined by me today. Vitals stable.   DC time spent by me face to face excluding billable procedures 38 mins            Patient is a 89 year old female with PMHx of HTN, hx of DVT (previously on warfarin but no longer taking it), cataracts, and hx of benign colonic mass (s/p R. hemicolectomy in 1998) who presented to the ED on 1/28/24 for unwitnessed fall and admitted for unwitnessed fall and elevated troponin.       Unwitnessed fall  Complaints of "toppling" onto the floor and then could not get up, states did not hit her head  NCHCT without acute intracranial hemorrhage, mass effect, or acute displaced calvarium fracture but with involutional chronic microvascular ischemic gliotic changes  Fall precautions,  consulted; pt reports she was previously independent with all ADLs at home but now thinks she needs assistance at home. PT consulted and recommended [facility].     Elevated troponin, possibly secondary to demand ischemia  EKG with TWI in leads V3-V6 (unchanged from prior in 4/2023)  Troponin 386.7 on admission  Flat troponin curve.      Mild probably traumatic (from fall) rhabdomyolysis  Complaints of fall at home and laying on the floor since this AM   on admission  S/p 1L NS bolus in the ED    HTN, better controlled.     cont current home meds. monitor blood pressure at rehab.     Glaucoma  PTA dorzolamide / timolol 2% / 0.5% 1 gtt, latanoprost 0.005% 1 gtt qhs    Hx of LE DVT  previously on warfarin  states no longer on AC    PT louiseal recommends rehab, discussed with family members, agree with Rehab. ok to dc home to [facility] today.     Seen and examined by me today. Vitals stable.   DC time spent by me face to face excluding billable procedures 38 mins

## 2024-02-01 NOTE — DISCHARGE NOTE PROVIDER - NSDCCPCAREPLAN_GEN_ALL_CORE_FT
PRINCIPAL DISCHARGE DIAGNOSIS  Diagnosis: Elevated troponin  Assessment and Plan of Treatment: possibly secondary to demand ischemia  EKG with TWI in leads V3-V6 (unchanged from prior in 4/2023)  Troponin 386.7 on admission        SECONDARY DISCHARGE DIAGNOSES  Diagnosis: Fall  Assessment and Plan of Treatment: Unwitnessed fall  Complaints of "toppling" onto the floor and then could not get up, states did not hit her head  NCHCT without acute intracranial hemorrhage, mass effect, or acute displaced calvarium fracture but with involutional chronic microvascular ischemic gliotic changes    Diagnosis: Rhabdomyolysis  Assessment and Plan of Treatment: Complaints of fall at home and laying on the floor since this AM   on admission.  Trended down during admission  S/p 1L NS bolus in the ED      Diagnosis: Glaucoma  Assessment and Plan of Treatment: Continue dorzolamide / timolol 2% / 0.5% 1 gtt, latanoprost 0.005% 1 gtt qhs      Diagnosis: HTN (hypertension)  Assessment and Plan of Treatment: Continue blood pressure medication regimen as directed. Monitor for any visual changes, headaches or dizziness.  Monitor blood pressure regularly.  Follow up with your PCP for further management for high blood pressure.       PRINCIPAL DISCHARGE DIAGNOSIS  Diagnosis: Elevated troponin  Assessment and Plan of Treatment: possibly secondary to demand ischemia  EKG with TWI in leads V3-V6 (unchanged from prior in 4/2023)  Troponin 386.7 on admission        SECONDARY DISCHARGE DIAGNOSES  Diagnosis: Fall  Assessment and Plan of Treatment: Unwitnessed fall  Complaints of "toppling" onto the floor and then could not get up, states did not hit her head  NCHCT without acute intracranial hemorrhage, mass effect, or acute displaced calvarium fracture but with involutional chronic microvascular ischemic gliotic changes    Diagnosis: Glaucoma  Assessment and Plan of Treatment: Continue dorzolamide / timolol 2% / 0.5% 1 gtt, latanoprost 0.005% 1 gtt qhs      Diagnosis: HTN (hypertension)  Assessment and Plan of Treatment: Continue blood pressure medication regimen as directed. Monitor for any visual changes, headaches or dizziness.  Monitor blood pressure regularly.  Follow up with your PCP for further management for high blood pressure.      Diagnosis: Rhabdomyolysis  Assessment and Plan of Treatment: Complaints of fall at home and laying on the floor since this AM   on admission.  Trended down during admission  S/p 1L NS bolus in the ED

## 2024-02-01 NOTE — DISCHARGE NOTE NURSING/CASE MANAGEMENT/SOCIAL WORK - NSDCVIVACCINE_GEN_ALL_CORE_FT
influenza, high-dose, quadrivalent; 24-Oct-2022 16:40; Manuelito Maddox (RN); [employer]; [id number] (Exp. Date: 30-Jun-2023); IntraMuscular; Deltoid Right.; 0.7 milliLiter(s); VIS (VIS Published: 06-Aug-2021, VIS Presented: 24-Oct-2022);

## 2024-02-01 NOTE — DISCHARGE NOTE PROVIDER - NSDCMRMEDTOKEN_GEN_ALL_CORE_FT
carvedilol 3.125 mg oral tablet: 1 tab(s) orally every 12 hours  dorzolamide-timolol 2%-0.5% ophthalmic solution: 1 drop(s) in each eye once a day  DULoxetine 30 mg oral delayed release capsule: 1 cap(s) orally once a day  latanoprost 0.005% ophthalmic solution: 1 drop(s) in each eye once a day (at bedtime)

## 2024-02-01 NOTE — DISCHARGE NOTE PROVIDER - NSDCQMSTROKE_NEU_ALL_CORE
Caller would like to discuss an/a Order for an antibiotic for a sinus infection she has had for 2+ weeks. Writer advised caller of callback within 24-72 hours.    Patient Name: Celi Logan  Caller Name: Celi  Callback Number: [phone]  Best Availability: any  Can A Detailed Message Be left? yes  Additional Info: [facility] pharmacy preferred.  Did you confirm the message with the caller?: yes    Thank you,  Elisabeth Arriaga     
Transferred to make an appointment.   
No

## 2024-02-01 NOTE — DISCHARGE NOTE PROVIDER - NSDCFUADDINST_GEN_ALL_CORE_FT
1) It is important to see your primary physician as well as other necessary consultants within7-10 days post rehab discharge to perform a comprehensive medical review.  Call their offices for an appointment as soon as you leave the hospital.  If you do not have a primary physician or unable to reach your PCP, contact the [facility] Physician Referral Service at [phone].  Your medical issues appear to be stable at this time, but if your symptoms recur or worsen, contact your physicians and/or return to the hospital if necessary.  If you encounter any issues or questions with your medication, call your physicians before stopping the medication.    2) Please access [facility] Patient portal (as instructed on the discharge paperwork) to access your medical records at any time after discharge.

## 2024-02-08 DIAGNOSIS — I48.91 UNSPECIFIED ATRIAL FIBRILLATION: ICD-10-CM

## 2024-02-08 DIAGNOSIS — Z86.718 PERSONAL HISTORY OF OTHER VENOUS THROMBOSIS AND EMBOLISM: ICD-10-CM

## 2024-02-08 DIAGNOSIS — H40.9 UNSPECIFIED GLAUCOMA: ICD-10-CM

## 2024-02-08 DIAGNOSIS — M19.90 UNSPECIFIED OSTEOARTHRITIS, UNSPECIFIED SITE: ICD-10-CM

## 2024-02-08 DIAGNOSIS — Z79.01 LONG TERM (CURRENT) USE OF ANTICOAGULANTS: ICD-10-CM

## 2024-02-08 DIAGNOSIS — R29.6 REPEATED FALLS: ICD-10-CM

## 2024-02-08 DIAGNOSIS — I10 ESSENTIAL (PRIMARY) HYPERTENSION: ICD-10-CM

## 2024-02-08 DIAGNOSIS — T79.6XXA TRAUMATIC ISCHEMIA OF MUSCLE, INITIAL ENCOUNTER: ICD-10-CM

## 2024-02-08 DIAGNOSIS — Z98.42 CATARACT EXTRACTION STATUS, LEFT EYE: ICD-10-CM

## 2024-02-08 DIAGNOSIS — Z98.890 OTHER SPECIFIED POSTPROCEDURAL STATES: ICD-10-CM

## 2024-02-08 DIAGNOSIS — Z79.899 OTHER LONG TERM (CURRENT) DRUG THERAPY: ICD-10-CM

## 2024-02-08 DIAGNOSIS — Z98.41 CATARACT EXTRACTION STATUS, RIGHT EYE: ICD-10-CM

## 2024-02-08 DIAGNOSIS — Z90.49 ACQUIRED ABSENCE OF OTHER SPECIFIED PARTS OF DIGESTIVE TRACT: ICD-10-CM

## 2024-02-08 DIAGNOSIS — I24.89 OTHER FORMS OF ACUTE ISCHEMIC HEART DISEASE: ICD-10-CM

## 2024-03-28 NOTE — ED ADULT TRIAGE NOTE - WAS YOUR LAST COVID-19 VACCINE GREATER THAN OR EQUAL TO TWO MONTHS AGO?
Urology Progress Note     Patient: Trinh Villarreal               Sex: female           MRN: [id number]       YOB: 1957        Age:  66 year old         Date: 3/28/2024           Subjective:     Patient well this morning, attempted to see patient but working with PT. Orosco removed 3/26 for voiding trial, started tamsulosin yesterday. Since then PVRs improved, had a PVR of 0mL this AM. No ISC needed for 24hrs.       Current Facility-Administered Medications   Medication    tamsulosin (FLOMAX) capsule 0.4 mg    sodium chloride tablet 1,000 mg    tiZANidine (ZANAFLEX) tablet 4 mg    hydrALAZINE (APRESOLINE) tablet 10 mg    polyethylene glycol (MIRALAX) packet 17 g    docusate sodium (ENEMEEZ MINI) 283 MG rectal enema 283 mg    lidocaine (LIDOCARE) 4 % patch 3 patch    oxyCODONE (IMM REL) (ROXICODONE) tablet 10 mg    Magnesium Standard Replacement Protocol    Phosphorus Standard Replacement Protocol    Potassium Standard Replacement Protocol (Levels 3.5 and lower)    naLOXone (NARCAN) injection 0.1 mg    acetaminophen (TYLENOL) tablet 650 mg    calcium carbonate (TUMS) chewable tablet 1,000 mg    docusate sodium-sennosides (SENOKOT S) 50-8.6 MG 2 tablet    magnesium hydroxide (MILK OF MAGNESIA) 400 MG/5ML suspension 30 mL    ondansetron (ZOFRAN ODT) disintegrating tablet 4 mg    Or    ondansetron (ZOFRAN) injection 4 mg    sodium chloride 0.9 % injection 10 mL    docusate sodium (ENEMEEZ MINI) 283 MG rectal enema 283 mg    melatonin tablet 3 mg    traMADol (ULTRAM) tablet 50 mg    amLODIPine (NORVASC) tablet 2.5 mg    famotidine (PEPCID) tablet 20 mg    fluticasone (FLONASE) 50 MCG/ACT nasal spray 1 spray    lactulose (CHRONULAC) 10 GM/15ML solution 10 g    lactulose (CHRONULAC) 200 GM/300 ML retention enema 200 g    megestrol (MEGACE) tablet 80 mg    polyethylene glycol (MIRALAX) packet 17 g    traMADol (ULTRAM) tablet 50 mg    hydrALAZINE (APRESOLINE) injection 10 mg    enoxaparin (LOVENOX)  injection 40 mg    nystatin (MYCOSTATIN) powder         Objective:   VITAL SIGNS:  Vital Last Value 24 Hour Range   Temperature 98.4 °F (36.9 °C) (03/28/24 0521) Temp  Min: 98.2 °F (36.8 °C)  Max: 98.4 °F (36.9 °C)   Pulse 99 (03/28/24 0849) Pulse  Min: 90  Max: 99   Respiratory 16 (03/28/24 0521) Resp  Min: 16  Max: 17   Non-Invasive  Blood Pressure 99/65 (03/28/24 0849) BP  Min: 99/65  Max: 124/68   Pulse Oximetry 96 % (03/28/24 0849) SpO2  Min: 96 %  Max: 98 %     I/Os:  I/O last 3 completed shifts:  In: 720 [P.O.:720]  Out: -     PVR: 114, 272, 116, 233, 0    Physical Exam:   General: Alert, cooperative, no acute distress  Abdominal: soft, non-tender, non-distended  : Continent into bedpan, urine clear  Neurologic: Alert and oriented      Lab/Data Reviewed:  Recent Labs   Lab 03/27/24  0606 03/25/24  0549 03/22/24  0559   SODIUM 132* 132* 134*   POTASSIUM 4.5 4.6 4.7   CHLORIDE 102 101 104   CO2 23 24 24   BUN 25* 21* 16   CREATININE 0.82 0.94 0.96*   CALCIUM 10.0 9.7 9.3   GLUCOSE 97 102* 121*     Recent Labs   Lab 03/27/24  0606 03/25/24  0549 03/22/24  0559   WBC 7.0 7.4 7.2   RBC 3.50* 3.44* 3.18*   HGB 9.5* 9.2* 8.7*   HCT 30.0* 29.3* 27.8*   * 570* 695*         Assessment/Plan     66 year old female with hx of CKD, essential hypertension, GERD, osteoporosis, sleep apnea, history of endometrial and uterine cancer, history of cauda equina syndrome and paraplegia with bowel and bladder incontinence, history of L1-S1 posterior spinal instrumented fusion and laminectomy and resection of tumor, history of adjuvant radiation and chemotherapy who presented to [facility] on 2/21/2024 for surgical intervention, now on acute rehab floor and the following urologic issues:     Urinary Retention-Improved   Hx of Neurogenic Bladder  -Hx of the above, occasional incontinence at baseline, no chronic olmedo or ISC  -Admitted since 2/21 following spinal surgery  -Patient reports constant incontinence rather than retention,  but PVRs unknown  -Orosco placed 2/25 for unknown residual, now draining clear yellow urine  -Unknown if patient was incontinent or if truly retaining  -Orosco removed 3/26, PVRs initially elevated and needed ISC x 1 for 800mL out  -PVRs improved yesterday and today, this morning PVR 0mL  -Continue tamsulosin   -Continue to monitor PVRs q shift, ISC if PVR > 400mL  -Avoid constipation, limit narcotics and increase ambulation     Will see w/ Dr. Sherrell Combs PA-C  [facility]  In house phone  or use [employer] to reach.   Yes

## 2024-06-25 PROBLEM — Z86.718 PERSONAL HISTORY OF OTHER VENOUS THROMBOSIS AND EMBOLISM: Chronic | Status: ACTIVE | Noted: 2024-01-29

## 2024-06-25 PROBLEM — H40.9 UNSPECIFIED GLAUCOMA: Chronic | Status: ACTIVE | Noted: 2024-01-29

## 2024-06-28 ENCOUNTER — APPOINTMENT (OUTPATIENT)
Dept: VASCULAR SURGERY | Facility: CLINIC | Age: 89
End: 2024-06-28
Payer: MEDICARE

## 2024-06-28 VITALS
HEIGHT: 63 IN | DIASTOLIC BLOOD PRESSURE: 88 MMHG | SYSTOLIC BLOOD PRESSURE: 172 MMHG | WEIGHT: 118 LBS | TEMPERATURE: 97.9 F | BODY MASS INDEX: 20.91 KG/M2 | HEART RATE: 96 BPM

## 2024-06-28 DIAGNOSIS — M79.89 OTHER SPECIFIED SOFT TISSUE DISORDERS: ICD-10-CM

## 2024-06-28 PROCEDURE — 99214 OFFICE O/P EST MOD 30 MIN: CPT

## 2024-06-28 PROCEDURE — 93971 EXTREMITY STUDY: CPT

## 2024-09-10 ENCOUNTER — APPOINTMENT (OUTPATIENT)
Dept: VASCULAR SURGERY | Facility: CLINIC | Age: 89
End: 2024-09-10
Payer: MEDICARE

## 2024-09-10 VITALS
TEMPERATURE: 98 F | HEART RATE: 101 BPM | DIASTOLIC BLOOD PRESSURE: 87 MMHG | WEIGHT: 110 LBS | HEIGHT: 63 IN | SYSTOLIC BLOOD PRESSURE: 157 MMHG | BODY MASS INDEX: 19.49 KG/M2

## 2024-09-10 DIAGNOSIS — I73.9 PERIPHERAL VASCULAR DISEASE, UNSPECIFIED: ICD-10-CM

## 2024-09-10 DIAGNOSIS — M79.89 OTHER SPECIFIED SOFT TISSUE DISORDERS: ICD-10-CM

## 2024-09-10 PROCEDURE — 93970 EXTREMITY STUDY: CPT

## 2024-09-10 PROCEDURE — 99214 OFFICE O/P EST MOD 30 MIN: CPT

## 2024-09-10 NOTE — HISTORY OF PRESENT ILLNESS
[FreeTextEntry1] : 89F w/ PMH  HTN, DVT on Coumadin (stopped 2022 due to hematuria), right hemicolectomy, SBO who presents for followup due to bilateral lower extremity pain, swelling, and inability to ambulate, with R greater than left.  Of note the patient was seen in clinic in 2023 for similar complaint, at that time arterial duplex was normal and venous duplex showed inability to visualize GSV. Patient denies history of venous procedures such as RFA or ligation. She denies history of CHF. She takes Carvedilol at home only,  Patient presented today for reevaluation of bilateral lower extremity.  Complains of bilateral lower extremity swelling.

## 2024-09-10 NOTE — END OF VISIT
[] : Resident [FreeTextEntry3] : bialt leg swelling.  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  Right no evidence of dvt rec compression, elevation.  rec zipper compression ro medical causes of swelling, but may be due mostly to dependent positioning  fu prn [Time Spent: ___ minutes] : I have spent [unfilled] minutes of time on the encounter which excludes teaching and separately reported services.

## 2024-09-10 NOTE — PHYSICAL EXAM
[Ankle Swelling Bilaterally] : bilaterally  [] : bilaterally [Ankle Swelling On The Left] : moderate [de-identified] : NAD. Pleasant and responsive [de-identified] : Normocephalic, atraumatic [de-identified] : Breathing comfortably [de-identified] : ALISSAR [FreeTextEntry1] : Bunion incision right 1st MTP. Healed shin ulcers bilaterally. Pitting edema 2+ on R side, 1+ on left. Venous stasis pigmentation changes noted bilaterally.

## 2024-09-10 NOTE — ASSESSMENT
[FreeTextEntry1] : 89F w/ PMH DVT on Warfarin, bunion, right hemicolectomy, HTN, who presents with concern for possible venous stasis disease,  -Obtain venous duplex to rule out DVT given unilateral swelling- no evidence of DVT at this time -Symptoms likely consistent with post thrombotic syndrome -Education given regarding importance of compression -Compression stocking 20 to 30 mmHg, leg elevation

## 2024-10-14 NOTE — ED PROVIDER NOTE - OBJECTIVE STATEMENT
Wheelchair/Stroller Pt is a 87 yo lady with a pmhx of HTN, colon resection, SBO who presents to the ED with abdominal pain. Pt started having abd pain yesterday. Has been intermittent, diffuse. Has had vomiting. No chest pain, no sob, no diarrhea, no fevers. Was concerned that it was similar to prior SBO. No dysuria.

## 2024-12-18 NOTE — ED ADULT TRIAGE NOTE - CHIEF COMPLAINT QUOTE
accidental fall , has being on the floor since 0900, unknown if pt is in blood thinners , family member went to check on her saw the pt on the floor and called 911 hx arthritis
Fair

## 2025-02-10 NOTE — DISCHARGE NOTE NURSING/CASE MANAGEMENT/SOCIAL WORK - NSFLUVACAGEDISCH_IMM_ALL_CORE
Medicare Wellness Visit  Plan for Preventive Care    A good way for you to stay healthy is to use preventive care.  Medicare covers many services that can help you stay healthy.* The goal of these services is to find any health problems as quickly as possible. Finding problems early can help make them easier to treat.  Your personal plan below lists the services you may need and when they are due.      Health Maintenance Summary     Influenza Vaccine (1)  Order placed this encounter    COVID-19 Vaccine (6 - 2024-25 season)  Order placed this encounter    Medicare Advantage- Medicare Wellness Visit (Yearly - January to December)  Due since 1/1/2025    Diabetes A1C (Every 3 Months)  Order placed this encounter    Microalbumin Ratio (Yearly)  Order placed this encounter    Diabetes Foot Exam (Yearly)  Next due on 8/7/2025    Diabetes Eye Exam (Yearly)  Next due on 8/19/2025    GFR (Yearly)  Order placed this encounter    Depression Screening (Yearly)  Next due on 2/9/2026    DTaP/Tdap/Td Vaccine (3 - Td or Tdap)  Next due on 10/24/2028    Hepatitis B Vaccine   Completed    Pneumococcal Vaccine 0-49   Completed    Hepatitis A Vaccine   Aged Out    Meningococcal Vaccine   Aged Out    Meningococcal Serogroup B Vaccine   Aged Out    HPV Vaccine   Aged Out           Preventive Care for Women and Men    Heart Screenings (Cardiovascular):  Blood tests are used to check your cholesterol, lipid and triglyceride levels. High levels can increase your risk for heart disease and stroke. High levels can be treated with medications, diet and exercise. Lowering your levels can help keep your heart and blood vessels healthy.  Your provider will order these tests if they are needed.    An ultrasound is done to see if you have an abdominal aortic aneurysm (AAA).  This is an enlargement of one of the main blood vessels that delivers blood to the body.   In the [place], 9,000 deaths are caused by AAA.  You may not even know you  have this problem and as many as 1 in 3 people will have a serious problem if it is not treated.  Early diagnosis allows for more effective treatment and cure.  If you have a family history of AAA or are a male age 65-75 who has smoked, you are at higher risk of an AAA.  Your provider can order this test, if needed.    Colorectal Screening:  There are many tests that are used to check for cancer of your colon and rectum. You and your provider should discuss what test is best for you and when to have it done.  Options include:  Screening Colonoscopy: exam of the entire colon, seen through a flexible lighted tube.  Flexible Sigmoidoscopy: exam of the last third (sigmoid portion) of the colon and rectum, seen through a flexible lighted tube.  Cologuard DNA stool test: a sample of your stool is used to screen for cancer and unseen blood in your stool.  Fecal Occult Blood Test: a sample of your stool is studied to find any unseen blood    Flu Shot:  An immunization that helps to prevent influenza (the flu). You should get this every year. The best time to get the shot is in the fall.    Pneumococcal Shot:  Vaccines help prevent pneumococcal disease, which is any type of illness caused by Streptococcus pneumoniae bacteria. There are two kinds of pneumococcal vaccines available in the [place]:   Pneumococcal conjugate vaccines (PCV20 or Lyxdwty47®)  Pneumococcal polysaccharide vaccine (PPSV23 or [id number]®)  For those who have never received any pneumococcal conjugate vaccine, CDC recommends PVC20 for adults 65 years or older and adults 19 through 64 years old with certain medical conditions or risk factors.   For those who have previously received PCV13, this should be followed by a dose of PPSV23.     Hepatitis B Shot:  An immunization that helps to protect people from getting Hepatitis B. Hepatitis B is a virus that spreads through contact with infected blood or body fluids. Many people with the virus do not  have symptoms.  The virus can lead to serious problems, such as liver disease. Some people are at higher risk than others. Your doctor will tell you if you need this shot.     Diabetes Screening:  A test to measure sugar (glucose) in your blood is called a fasting blood sugar. Fasting means you cannot have food or drink for at least 8 hours before the test. This test can detect diabetes long before you may notice symptoms.    Glaucoma Screening:  Glaucoma screening is performed by your eye doctor. The test measures the fluid pressure inside your eyes to determine if you have glaucoma.     Hepatitis C Screening:  A blood test to see if you have the hepatitis C virus.  Hepatitis C attacks the liver and is a major cause of chronic liver disease.  Medicare will cover a single screening for all adults born between 1945 & 1965, or high risk patients (people who have injected illegal drugs or people who have had blood transfusions).  High risk patients who continue to inject illegal drugs can be screened for Hepatitis C every year.    Smoking and Tobacco-Use Cessation Counseling:  Tobacco is the single greatest cause of disease and early death in our country today. Medication and counseling together can increase a person’s chance of quitting for good.   Medicare covers two quitting attempts per year, with four counseling sessions per attempt (eight sessions in a 12 month period)    Preventive Screening tests for Women    Screening Mammograms and Breast Exams:  An x-ray of your breasts to check for breast cancer before you or your doctor may be able to feel it.  If breast cancer is found early it can usually be treated with success.    Pelvic Exams and Pap Tests:  An exam to check for cervical and vaginal cancer. A Pap test is a lab test in which cells are taken from your cervix and sent to the lab to look for signs of cervical cancer. If cancer of the cervix is found early, chances for a cure are good. Testing can  generally end at age 65, or if a woman has a hysterectomy for a benign condition. Your provider may recommend more frequent testing if certain abnormal results are found.    Bone Mass Measurements:  A painless x-ray of your bone density to see if you are at risk for a broken bone. Bone density refers to the thickness of bones or how tightly the bone tissue is packed.    Preventive Screening tests for Men    Prostate Screening:  Should you have a prostate cancer test (PSA)?  It is up to you to decide if you want a prostate cancer test. Talk to your clinician to find out if the test is right for you.  Things for you to consider and talk about should include:  Benefits and harms of the test  Your family history  How your race/ethnicity may influence the test  If the test may impact other medical conditions you have  Your values on screenings and treatments    *Medicare pays for many preventive services to keep you healthy. For some of these services, you might have to pay a deductible, coinsurance, and / or copayment.  The amounts vary depending on the type of services you need and the kind of Medicare health plan you have.    For further details on screenings offered by Medicare please visit: https://www.medicare.gov/coverage/preventive-screening-services      Adult